# Patient Record
Sex: FEMALE | Race: WHITE | NOT HISPANIC OR LATINO
[De-identification: names, ages, dates, MRNs, and addresses within clinical notes are randomized per-mention and may not be internally consistent; named-entity substitution may affect disease eponyms.]

---

## 2017-01-02 ENCOUNTER — HISTORIC RESULTS (OUTPATIENT)
Dept: ADMINISTRATIVE | Age: 62
End: 2017-01-02

## 2018-01-30 ENCOUNTER — DOCUMENTATION ONLY (OUTPATIENT)
Dept: FAMILY MEDICINE | Facility: OTHER | Age: 63
End: 2018-01-30

## 2018-01-30 PROBLEM — R60.0 PEDAL EDEMA: Status: ACTIVE | Noted: 2017-05-18

## 2018-01-30 RX ORDER — ESTRADIOL 0.05 MG/D
PATCH TRANSDERMAL
COMMUNITY
Start: 2017-12-14

## 2018-01-30 RX ORDER — CLOTRIMAZOLE 1 %
CREAM (GRAM) TOPICAL
COMMUNITY
Start: 2014-09-30

## 2018-01-30 RX ORDER — BUPROPION HYDROCHLORIDE 300 MG/1
300 TABLET ORAL EVERY MORNING
COMMUNITY
Start: 2017-12-14

## 2018-01-30 RX ORDER — ATORVASTATIN CALCIUM 20 MG/1
20 TABLET, FILM COATED ORAL DAILY
COMMUNITY
Start: 2017-12-14

## 2018-01-30 RX ORDER — METOPROLOL SUCCINATE 25 MG/1
25 TABLET, EXTENDED RELEASE ORAL DAILY
COMMUNITY
Start: 2017-12-14

## 2018-01-30 RX ORDER — DIPHENOXYLATE HYDROCHLORIDE AND ATROPINE SULFATE 2.5; .025 MG/1; MG/1
1 TABLET ORAL DAILY
COMMUNITY

## 2018-01-30 RX ORDER — FUROSEMIDE 20 MG
20 TABLET ORAL EVERY MORNING
COMMUNITY
Start: 2017-12-14

## 2018-01-30 RX ORDER — DILTIAZEM HYDROCHLORIDE 120 MG/1
120 CAPSULE, COATED, EXTENDED RELEASE ORAL DAILY
COMMUNITY
Start: 2017-12-14

## 2018-04-19 ENCOUNTER — TELEPHONE (OUTPATIENT)
Dept: OTHER | Facility: CLINIC | Age: 63
End: 2018-04-19

## 2018-04-19 NOTE — TELEPHONE ENCOUNTER
4/19/2018    Call Regarding Onboarding are Choices    Attempt 1    Message on voicemail     Comments: 0 DEP      Outreach   CC

## 2018-05-01 ENCOUNTER — OFFICE VISIT - HEALTHEAST (OUTPATIENT)
Dept: FAMILY MEDICINE | Facility: CLINIC | Age: 63
End: 2018-05-01

## 2018-05-01 DIAGNOSIS — E89.41 SYMPTOMATIC STATES ASSOCIATED WITH ARTIFICIAL MENOPAUSE: ICD-10-CM

## 2018-05-01 DIAGNOSIS — E66.9 OBESITY: ICD-10-CM

## 2018-05-01 DIAGNOSIS — I10 HTN (HYPERTENSION): ICD-10-CM

## 2018-05-01 DIAGNOSIS — I48.91 ATRIAL FIBRILLATION WITH RVR (H): ICD-10-CM

## 2018-05-01 DIAGNOSIS — R73.01 ELEVATED FASTING BLOOD SUGAR: ICD-10-CM

## 2018-05-01 LAB — HBA1C MFR BLD: 5.7 % (ref 3.5–6)

## 2018-05-01 ASSESSMENT — MIFFLIN-ST. JEOR: SCORE: 1734.51

## 2018-05-03 NOTE — TELEPHONE ENCOUNTER
5/3/2018    Call Regarding Onboarding [employer] Choices    Attempt 2    Message Patient wants a call back within an hour, will arrange call back.     Comments: 0 DEP      Outreach   CC

## 2018-05-03 NOTE — TELEPHONE ENCOUNTER
5/3/2018      [facility] Choices- patient decided to declined call and will call back if she has any questions.             Outreach ,  Zulay Hayward

## 2018-05-11 ENCOUNTER — OFFICE VISIT - HEALTHEAST (OUTPATIENT)
Dept: CARDIOLOGY | Facility: CLINIC | Age: 63
End: 2018-05-11

## 2018-05-11 DIAGNOSIS — I48.0 PAF (PAROXYSMAL ATRIAL FIBRILLATION) (H): ICD-10-CM

## 2018-05-11 ASSESSMENT — MIFFLIN-ST. JEOR: SCORE: 1734.51

## 2018-05-25 ENCOUNTER — HOSPITAL ENCOUNTER (OUTPATIENT)
Dept: CARDIOLOGY | Facility: HOSPITAL | Age: 63
Discharge: HOME | End: 2018-05-25
Attending: INTERNAL MEDICINE

## 2018-05-25 ENCOUNTER — HOSPITAL ENCOUNTER (OUTPATIENT)
Dept: NUCLEAR MEDICINE | Facility: HOSPITAL | Age: 63
Discharge: HOME | End: 2018-05-25
Attending: INTERNAL MEDICINE

## 2018-05-25 ENCOUNTER — OFFICE VISIT (OUTPATIENT)
Dept: URGENT CARE | Facility: URGENT CARE | Age: 63
End: 2018-05-25
Payer: COMMERCIAL

## 2018-05-25 VITALS
WEIGHT: 254 LBS | HEART RATE: 69 BPM | HEIGHT: 67 IN | DIASTOLIC BLOOD PRESSURE: 88 MMHG | OXYGEN SATURATION: 97 % | TEMPERATURE: 99 F | BODY MASS INDEX: 39.87 KG/M2 | SYSTOLIC BLOOD PRESSURE: 142 MMHG

## 2018-05-25 DIAGNOSIS — R31.9 URINARY TRACT INFECTION WITH HEMATURIA, SITE UNSPECIFIED: Primary | ICD-10-CM

## 2018-05-25 DIAGNOSIS — N39.0 URINARY TRACT INFECTION WITH HEMATURIA, SITE UNSPECIFIED: Primary | ICD-10-CM

## 2018-05-25 DIAGNOSIS — R30.0 DYSURIA: ICD-10-CM

## 2018-05-25 DIAGNOSIS — R82.90 NONSPECIFIC FINDING ON EXAMINATION OF URINE: ICD-10-CM

## 2018-05-25 DIAGNOSIS — I48.0 PAF (PAROXYSMAL ATRIAL FIBRILLATION) (H): ICD-10-CM

## 2018-05-25 LAB
ALBUMIN UR-MCNC: 100 MG/DL
APPEARANCE UR: ABNORMAL
BILIRUB UR QL STRIP: NEGATIVE
COLOR UR AUTO: ABNORMAL
CV STRESS CURRENT BP HE: NORMAL
CV STRESS CURRENT HR HE: 64
CV STRESS CURRENT HR HE: 68
CV STRESS CURRENT HR HE: 70
CV STRESS CURRENT HR HE: 75
CV STRESS CURRENT HR HE: 75
CV STRESS CURRENT HR HE: 79
CV STRESS CURRENT HR HE: 79
CV STRESS CURRENT HR HE: 80
CV STRESS CURRENT HR HE: 84
CV STRESS CURRENT HR HE: 84
CV STRESS CURRENT HR HE: 86
CV STRESS CURRENT HR HE: 86
CV STRESS CURRENT HR HE: 87
CV STRESS CURRENT HR HE: 89
CV STRESS CURRENT HR HE: 91
CV STRESS CURRENT HR HE: 92
CV STRESS DEVIATION TIME HE: NORMAL
CV STRESS ECHO PERCENT HR HE: NORMAL
CV STRESS EXERCISE STAGE HE: NORMAL
CV STRESS FINAL RESTING BP HE: NORMAL
CV STRESS FINAL RESTING HR HE: 75
CV STRESS MAX HR HE: 93
CV STRESS MAX TREADMILL GRADE HE: 0
CV STRESS MAX TREADMILL SPEED HE: 0
CV STRESS PEAK DIA BP HE: NORMAL
CV STRESS PEAK SYS BP HE: NORMAL
CV STRESS PHASE HE: NORMAL
CV STRESS PROTOCOL HE: NORMAL
CV STRESS RESTING PT POSITION HE: NORMAL
CV STRESS ST DEVIATION AMOUNT HE: NORMAL
CV STRESS ST DEVIATION ELEVATION HE: NORMAL
CV STRESS ST EVELATION AMOUNT HE: NORMAL
CV STRESS TEST TYPE HE: NORMAL
CV STRESS TOTAL STAGE TIME MIN 1 HE: NORMAL
GLUCOSE UR STRIP-MCNC: NEGATIVE MG/DL
HGB UR QL STRIP: ABNORMAL
KETONES UR STRIP-MCNC: NEGATIVE MG/DL
LEUKOCYTE ESTERASE UR QL STRIP: ABNORMAL
NITRATE UR QL: NEGATIVE
NUC STRESS EJECTION FRACTION: 72 %
PH UR STRIP: 6 PH (ref 5–7)
RBC #/AREA URNS AUTO: ABNORMAL /HPF
SOURCE: ABNORMAL
SP GR UR STRIP: 1.01 (ref 1–1.03)
SPECIMEN SOURCE: NORMAL
STRESS ECHO BASELINE BP: NORMAL
STRESS ECHO BASELINE HR: 71
STRESS ECHO CALCULATED PERCENT HR: 60 %
STRESS ECHO LAST STRESS BP: NORMAL
STRESS ECHO LAST STRESS HR: 86
UROBILINOGEN UR STRIP-ACNC: 0.2 EU/DL (ref 0.2–1)
WBC #/AREA URNS AUTO: ABNORMAL /HPF
WET PREP SPEC: NORMAL

## 2018-05-25 PROCEDURE — 87088 URINE BACTERIA CULTURE: CPT | Performed by: FAMILY MEDICINE

## 2018-05-25 PROCEDURE — 87210 SMEAR WET MOUNT SALINE/INK: CPT | Performed by: FAMILY MEDICINE

## 2018-05-25 PROCEDURE — 81001 URINALYSIS AUTO W/SCOPE: CPT | Performed by: FAMILY MEDICINE

## 2018-05-25 PROCEDURE — 99213 OFFICE O/P EST LOW 20 MIN: CPT | Performed by: FAMILY MEDICINE

## 2018-05-25 PROCEDURE — 87186 SC STD MICRODIL/AGAR DIL: CPT | Performed by: FAMILY MEDICINE

## 2018-05-25 PROCEDURE — 87086 URINE CULTURE/COLONY COUNT: CPT | Performed by: FAMILY MEDICINE

## 2018-05-25 RX ORDER — NITROFURANTOIN 25; 75 MG/1; MG/1
100 CAPSULE ORAL 2 TIMES DAILY
Qty: 14 CAPSULE | Refills: 0 | Status: SHIPPED | OUTPATIENT
Start: 2018-05-25 | End: 2018-06-01

## 2018-05-25 ASSESSMENT — MIFFLIN-ST. JEOR: SCORE: 1726.57

## 2018-05-25 NOTE — MR AVS SNAPSHOT
"              After Visit Summary   2018    Dana Sykes    MRN: [id number]           Patient Information     Date Of Birth          1955        Visit Information        Provider Department      2018 7:50 PM MonySabiha,  [facility] [place]        Today's Diagnoses     Urinary tract infection with hematuria, site unspecified    -  1    Dysuria        Nonspecific finding on examination of urine           Follow-ups after your visit        Who to contact     If you have questions or need follow up information about today's clinic visit or your schedule please contact [facility] directly at [phone].  Normal or non-critical lab and imaging results will be communicated to you by [employer], letter or phone within 4 business days after the clinic has received the results. If you do not hear from us within 7 days, please contact the clinic through [employer] or phone. If you have a critical or abnormal lab result, we will notify you by phone as soon as possible.  Submit refill requests through [facility] or call your pharmacy and they will forward the refill request to us. Please allow 3 business days for your refill to be completed.          Additional Information About Your Visit        MyChart Information     [facility] lets you send messages to your doctor, view your test results, renew your prescriptions, schedule appointments and more. To sign up, go to [email or web address] . Click on \"Log in\" on the left side of the screen, which will take you to the Welcome page. Then click on \"Sign up Now\" on the right side of the page.     You will be asked to enter the access code listed below, as well as some personal information. Please follow the directions to create your username and password.     Your access code is: [email or web address]  Expires: 2018  8:45 PM     Your access code will  in 90 days. If you need help or a new code, please call your [place] " "clinic or [phone].        Care EveryWhere ID     This is your Care EveryWhere ID. This could be used by other organizations to access your [place] medical records  [id number]        Your Vitals Were     Pulse Temperature Height Pulse Oximetry BMI (Body Mass Index)       69 99  F (37.2  C) (Oral) 5' 7\" (1.702 m) 97% 39.78 kg/m2        Blood Pressure from Last 3 Encounters:   05/25/18 142/88   08/15/16 128/80   04/15/16 138/62    Weight from Last 3 Encounters:   05/25/18 254 lb (115.2 kg)   08/15/16 245 lb (111.1 kg)   11/04/15 239 lb 6.4 oz (108.6 kg)              We Performed the Following     *UA reflex to Microscopic and Culture ([place] and [facility] (except [place] and [place])     Urine Culture Aerobic Bacterial     Urine Microscopic     Wet prep          Today's Medication Changes          These changes are accurate as of 5/25/18  8:45 PM.  If you have any questions, ask your nurse or doctor.               Start taking these medicines.        Dose/Directions    nitroFURantoin (macrocrystal-monohydrate) 100 MG capsule   Commonly known as:  MACROBID   Used for:  Urinary tract infection with hematuria, site unspecified   Started by:  Sabiha Sykes DO        Dose:  100 mg   Take 1 capsule (100 mg) by mouth 2 times daily for 7 days   Quantity:  14 capsule   Refills:  0            Where to get your medicines      These medications were sent to [id number] - [place], [place] - [address] AT [facility] [address]  [facility] [address], [place] [address]    Hours:  24-hours Phone:  [phone]     nitroFURantoin (macrocrystal-monohydrate) 100 MG capsule                Primary Care Provider Fax #    Provider Not In System [phone]                Equal Access to Services     MARCELA COCHRAN AH: Serena olguin Somaria del carmen, waaxda luqadaha, qaybta kaalmada adeegyaguanaco, rachel ivey. So [facility] [place] [facility] [phone].    ATENCIÓN: Si rylee stubbs " a knox disposición servicios gratuitos de asistencia lingüística. Ramirez olguin [phone].    We comply with applicable federal civil rights laws and [place] laws. We do not discriminate on the basis of race, color, national origin, age, disability, sex, sexual orientation, or gender identity.            Thank you!     Thank you for choosing [facility]  for your care. Our goal is always to provide you with excellent care. Hearing back from our patients is one way we can continue to improve our services. Please take a few minutes to complete the written survey that you may receive in the mail after your visit with us. Thank you!             Your Updated Medication List - Protect others around you: Learn how to safely use, store and throw away your medicines at www.disposemymeds.org.          This list is accurate as of 5/25/18  8:45 PM.  Always use your most recent med list.                   Brand Name Dispense Instructions for use Diagnosis    atorvastatin 20 MG tablet    LIPITOR     Take 20 mg by mouth daily        buPROPion 300 MG 24 hr tablet    WELLBUTRIN XL     Take 300 mg by mouth every morning        clotrimazole 1 % cream    LOTRIMIN          diltiazem 120 MG 24 hr capsule    CARDIZEM CD/CARTIA XT     Take 120 mg by mouth daily        estradiol 0.05 MG/24HR WK patch    CLIMARA     apply 1 patch on dry, clean , hairless skin once weekly        furosemide 20 MG tablet    LASIX     Take 20 mg by mouth every morning        metoprolol succinate 25 MG 24 hr tablet    TOPROL-XL     Take 25 mg by mouth daily        MULTI-VITAMINS Tabs      Take 1 tablet by mouth daily        nitroFURantoin (macrocrystal-monohydrate) 100 MG capsule    MACROBID    14 capsule    Take 1 capsule (100 mg) by mouth 2 times daily for 7 days    Urinary tract infection with hematuria, site unspecified       rivaroxaban ANTICOAGULANT 10 MG Tabs tablet    XARELTO     Take 20 mg by mouth daily        vitamin D3 1000 units  Caps      Take 1,000 Units by mouth daily

## 2018-05-26 NOTE — PROGRESS NOTES
"SUBJECTIVE:  Dana Sykes is a 62 year old female who  presents today for a possible UTI.   Symptoms:dysuria, frequency and burning  Onset of symptoms: today  Severity: moderate.    Discharge present?: no, but vaginal area feeling a little raw.    Was just on keflex recently for a UTI diagnosed about 2-3 weeks ago, symptoms were resolved until today.  Hematuria?: yes noticed some blood on the tissue.  LMP: No LMP recorded. Patient is postmenopausal.  This patient does have a history of urinary tract infections.   Patient denies: fevers, flank pain or vomiting     ROS:   5-Point Review of Systems Negative-- Except as stated above.    OBJECTIVE:  /88  Pulse 69  Temp 99  F (37.2  C) (Oral)  Ht 5' 7\" (1.702 m)  Wt 254 lb (115.2 kg)  SpO2 97%  BMI 39.78 kg/m2  GENERAL APPEARANCE: healthy, alert and no distress  :  External genitalia appears normal.  There is some erythema/irritated skin inner labia both sides, no lesions and skin intact.    Results for orders placed or performed in visit on 05/25/18   *UA reflex to Microscopic and Culture ([place] [facility] (except [place] and [place])   Result Value Ref Range    Color Urine Brown     Appearance Urine Cloudy     Glucose Urine Negative NEG^Negative mg/dL    Bilirubin Urine Negative NEG^Negative    Ketones Urine Negative NEG^Negative mg/dL    Specific Gravity Urine 1.010 1.003 - 1.035    Blood Urine Large (A) NEG^Negative    pH Urine 6.0 5.0 - 7.0 pH    Protein Albumin Urine 100 (A) NEG^Negative mg/dL    Urobilinogen Urine 0.2 0.2 - 1.0 EU/dL    Nitrite Urine Negative NEG^Negative    Leukocyte Esterase Urine Moderate (A) NEG^Negative    Source Midstream Urine    Urine Microscopic   Result Value Ref Range    WBC Urine  (A) OTO5^0 - 5 /HPF    RBC Urine  (A) OTO2^O - 2 /HPF   Wet prep   Result Value Ref Range    Specimen Description Vagina     Wet Prep No clue cells seen     Wet Prep No yeast seen     Wet Prep No Trichomonas seen  "         ASSESSMENT/PLAN:     ICD-10-CM    1. Urinary tract infection with hematuria, site unspecified N39.0 nitroFURantoin, macrocrystal-monohydrate, (MACROBID) 100 MG capsule    R31.9    2. Dysuria R30.0 *UA reflex to Microscopic and Culture ([place] [facility] (except [place] and [place])     Urine Microscopic     Wet prep   3. Nonspecific finding on examination of urine R82.90 Urine Culture Aerobic Bacterial         Start the macrobid tonight.  UC pending.  We discussed the expected course of the illness and symptomatic cares in detail.   Advised to return to care if symptoms not improving as expected, do not resolve completely, or if any new or worsening symptoms develop.

## 2018-05-27 LAB
BACTERIA SPEC CULT: ABNORMAL
SPECIMEN SOURCE: ABNORMAL

## 2018-05-29 ENCOUNTER — COMMUNICATION - HEALTHEAST (OUTPATIENT)
Dept: CARDIOLOGY | Facility: CLINIC | Age: 63
End: 2018-05-29

## 2018-06-22 ENCOUNTER — OFFICE VISIT (OUTPATIENT)
Dept: URGENT CARE | Facility: URGENT CARE | Age: 63
End: 2018-06-22
Payer: COMMERCIAL

## 2018-06-22 VITALS
WEIGHT: 258.4 LBS | OXYGEN SATURATION: 96 % | BODY MASS INDEX: 40.47 KG/M2 | DIASTOLIC BLOOD PRESSURE: 64 MMHG | RESPIRATION RATE: 20 BRPM | SYSTOLIC BLOOD PRESSURE: 140 MMHG | TEMPERATURE: 98.6 F | HEART RATE: 60 BPM

## 2018-06-22 DIAGNOSIS — R30.0 DYSURIA: Primary | ICD-10-CM

## 2018-06-22 DIAGNOSIS — R82.90 NONSPECIFIC FINDING ON EXAMINATION OF URINE: ICD-10-CM

## 2018-06-22 LAB
ALBUMIN UR-MCNC: ABNORMAL MG/DL
APPEARANCE UR: ABNORMAL
BILIRUB UR QL STRIP: NEGATIVE
COLOR UR AUTO: YELLOW
GLUCOSE UR STRIP-MCNC: NEGATIVE MG/DL
HGB UR QL STRIP: ABNORMAL
KETONES UR STRIP-MCNC: NEGATIVE MG/DL
LEUKOCYTE ESTERASE UR QL STRIP: ABNORMAL
NITRATE UR QL: NEGATIVE
PH UR STRIP: 6 PH (ref 5–7)
RBC #/AREA URNS AUTO: ABNORMAL /HPF
SOURCE: ABNORMAL
SP GR UR STRIP: <=1.005 (ref 1–1.03)
UROBILINOGEN UR STRIP-ACNC: 0.2 EU/DL (ref 0.2–1)
WBC #/AREA URNS AUTO: ABNORMAL /HPF

## 2018-06-22 PROCEDURE — 87086 URINE CULTURE/COLONY COUNT: CPT | Performed by: FAMILY MEDICINE

## 2018-06-22 PROCEDURE — 81001 URINALYSIS AUTO W/SCOPE: CPT | Performed by: FAMILY MEDICINE

## 2018-06-22 PROCEDURE — 99213 OFFICE O/P EST LOW 20 MIN: CPT | Performed by: NURSE PRACTITIONER

## 2018-06-22 RX ORDER — SULFAMETHOXAZOLE/TRIMETHOPRIM 800-160 MG
1 TABLET ORAL 2 TIMES DAILY
Qty: 14 TABLET | Refills: 0 | Status: SHIPPED | OUTPATIENT
Start: 2018-06-22

## 2018-06-22 NOTE — MR AVS SNAPSHOT
After Visit Summary   6/22/2018    Dana Sykes    MRN: [id number]           Patient Information     Date Of Birth          1955        Visit Information        Provider Department      6/22/2018 8:45 PM Vianey Steven NP [facility]        Today's Diagnoses     Dysuria    -  1    Nonspecific finding on examination of urine          Care Instructions      Anatomy of the Female Urinary Tract  Your urinary tract helps get rid of urine (your body s liquid waste). The kidneys collect chemicals and water your body doesn t need. This is turned into urine. Urine travels out of the kidneys through the ureters to the bladder. The bladder holds urine until you re ready to release it. The urethra carries urine from the bladder out of the body. The main sphincter muscle circles the mid-urethra.      Front view of female urinary tract.   Date Last Reviewed: 1/1/2017 2000-2017 [employer]. [address] [place] [address]. All rights reserved. This information is not intended as a substitute for professional medical care. Always follow your healthcare professional's instructions.        Dysuria     Painful urination (dysuria) is often caused by a problem in the urinary tract.   Dysuria is pain felt during urination. It is often described as a burning. Learn more about this problem and how it can be treated.  What causes dysuria?  Possible causes include:    Infection with a bacteria or virus such as a urinary tract infection (UTI or a sexually transmitted infection (STI)    Sensitivity or allergy to chemicals such as those found in lotions and other products    Prostate or bladder problems    Radiation therapy to the pelvic area  How is dysuria diagnosed?  Your healthcare provider will examine you. He or she will ask about your symptoms and health. After talking with you and doing a physical exam, your healthcare provider may know what is causing your dysuria.  "He or she will usually request  a sample of your urine. Tests of your urine, or a \"urinalysis,\" are done. A urinalysis may include:    Looking at the urine sample (visual exam)    Checking for substances (chemical exam)    Looking at a small amount under a microscope (microscopic exam)  Some parts of the urinalysis may be done in the provider's office and some in a lab. And, the urine sample may be checked for bacteria and yeast (urine culture). Your healthcare provider will tell you more about these tests if they are needed.  How is dysuria treated?  Treatment depends on the cause. If you have a bacterial infection, you may need antibiotics. You may be given medicines to make it easier for you to urinate and help relieve pain. Your healthcare provider can tell you more about your treatment options. Untreated, symptoms may get worse.  When to call your healthcare provider  Call the healthcare provider right away if you have any of the following:    Fever of 100.4 F (38 C) or higher     No improvement after three days of treatment    Trouble urinating because of pain    New or increased discharge from the vagina or penis    Rash or joint pain    Increased back or abdominal pain    Enlarged painful lymph nodes (lumps) in the groin   Date Last Reviewed: 1/1/2017 2000-2017 [employer]. [address] [place] [address], [place] [address]. All rights reserved. This information is not intended as a substitute for professional medical care. Always follow your healthcare professional's instructions.  Bactrim DS one tablet twice a day for 7 days                 Follow-ups after your visit        Who to contact     If you have questions or need follow up information about today's clinic visit or your schedule please contact [facility] directly at [phone].  Normal or non-critical lab and imaging results will be communicated to you by MyChart, letter or phone within 4 business days after the " "clinic has received the results. If you do not hear from us within 7 days, please contact the clinic through [employer] or phone. If you have a critical or abnormal lab result, we will notify you by phone as soon as possible.  Submit refill requests through [employer] or call your pharmacy and they will forward the refill request to us. Please allow 3 business days for your refill to be completed.          Additional Information About Your Visit        [email or web address] Information     [employer] lets you send messages to your doctor, view your test results, renew your prescriptions, schedule appointments and more. To sign up, go to [email or web address] . Click on \"Log in\" on the left side of the screen, which will take you to the Welcome page. Then click on \"Sign up Now\" on the right side of the page.     You will be asked to enter the access code listed below, as well as some personal information. Please follow the directions to create your username and password.     Your access code is: [email or web address]  Expires: 2018  8:45 PM     Your access code will  in 90 days. If you need help or a new code, please call your [place] clinic or [phone].        Care EveryWhere ID     This is your Care EveryWhere ID. This could be used by other organizations to access your [place] medical records  [id number]        Your Vitals Were     Pulse Temperature Respirations Pulse Oximetry BMI (Body Mass Index)       60 98.6  F (37  C) (Tympanic) 20 96% 40.47 kg/m2        Blood Pressure from Last 3 Encounters:   18 140/64   18 142/88   08/15/16 128/80    Weight from Last 3 Encounters:   18 258 lb 6.4 oz (117.2 kg)   18 254 lb (115.2 kg)   08/15/16 245 lb (111.1 kg)              We Performed the Following     UA with Microscopic reflex to Culture     Urine Culture Aerobic Bacterial          Today's Medication Changes          These changes are accurate as of 18  9:36 PM.  If you have any questions, ask your " nurse or doctor.               Start taking these medicines.        Dose/Directions    sulfamethoxazole-trimethoprim 800-160 MG per tablet   Commonly known as:  BACTRIM DS/SEPTRA DS   Used for:  Dysuria        Dose:  1 tablet   Take 1 tablet by mouth 2 times daily   Quantity:  14 tablet   Refills:  0            Where to get your medicines      [id number] - [place], [place] - [address] AT [facility] [address]  [address], [place] [address]    Hours:  24-hours Phone:  [phone]     sulfamethoxazole-trimethoprim 800-160 MG per tablet                Primary Care Provider Fax #    Provider Not In System [phone]                Equal Access to Services     MARCELA COCHRAN AH: Serena Marie, wagadiel marie, qaybta kaalmada jabari, rachel ivey. So [facility] [phone].    ATENCIÓN: Si habla español, tiene a knox disposición servicios gratuitos de asistencia lingüística. Llame al [phone].    We comply with applicable federal civil rights laws and [place] laws. We do not discriminate on the basis of race, color, national origin, age, disability, sex, sexual orientation, or gender identity.            Thank you!     Thank you for choosing [facility]  for your care. Our goal is always to provide you with excellent care. Hearing back from our patients is one way we can continue to improve our services. Please take a few minutes to complete the written survey that you may receive in the mail after your visit with us. Thank you!             Your Updated Medication List - Protect others around you: Learn how to safely use, store and throw away your medicines at www.disposemymeds.org.          This list is accurate as of 6/22/18  9:36 PM.  Always use your most recent med list.                   Brand Name Dispense Instructions for use Diagnosis    atorvastatin 20 MG tablet    LIPITOR      Take 20 mg by mouth daily        buPROPion 300 MG 24 hr tablet    WELLBUTRIN XL     Take 300 mg by mouth every morning        clotrimazole 1 % cream    LOTRIMIN          diltiazem 120 MG 24 hr capsule    CARDIZEM CD/CARTIA XT     Take 120 mg by mouth daily        estradiol 0.05 MG/24HR WK patch    CLIMARA     apply 1 patch on dry, clean , hairless skin once weekly        furosemide 20 MG tablet    LASIX     Take 20 mg by mouth every morning        metoprolol succinate 25 MG 24 hr tablet    TOPROL-XL     Take 25 mg by mouth daily        MULTI-VITAMINS Tabs      Take 1 tablet by mouth daily        rivaroxaban ANTICOAGULANT 10 MG Tabs tablet    XARELTO     Take 20 mg by mouth daily        sulfamethoxazole-trimethoprim 800-160 MG per tablet    BACTRIM DS/SEPTRA DS    14 tablet    Take 1 tablet by mouth 2 times daily    Dysuria       vitamin D3 1000 units Caps      Take 1,000 Units by mouth daily

## 2018-06-23 LAB
BACTERIA SPEC CULT: NORMAL
SPECIMEN SOURCE: NORMAL

## 2018-06-23 NOTE — PATIENT INSTRUCTIONS
"  Anatomy of the Female Urinary Tract  Your urinary tract helps get rid of urine (your body s liquid waste). The kidneys collect chemicals and water your body doesn t need. This is turned into urine. Urine travels out of the kidneys through the ureters to the bladder. The bladder holds urine until you re ready to release it. The urethra carries urine from the bladder out of the body. The main sphincter muscle circles the mid-urethra.      Front view of female urinary tract.   Date Last Reviewed: 1/1/2017 2000-2017 [employer]. [address] [place] [address]. All rights reserved. This information is not intended as a substitute for professional medical care. Always follow your healthcare professional's instructions.        Dysuria     Painful urination (dysuria) is often caused by a problem in the urinary tract.   Dysuria is pain felt during urination. It is often described as a burning. Learn more about this problem and how it can be treated.  What causes dysuria?  Possible causes include:    Infection with a bacteria or virus such as a urinary tract infection (UTI or a sexually transmitted infection (STI)    Sensitivity or allergy to chemicals such as those found in lotions and other products    Prostate or bladder problems    Radiation therapy to the pelvic area  How is dysuria diagnosed?  Your healthcare provider will examine you. He or she will ask about your symptoms and health. After talking with you and doing a physical exam, your healthcare provider may know what is causing your dysuria. He or she will usually request  a sample of your urine. Tests of your urine, or a \"urinalysis,\" are done. A urinalysis may include:    Looking at the urine sample (visual exam)    Checking for substances (chemical exam)    Looking at a small amount under a microscope (microscopic exam)  Some parts of the urinalysis may be done in the provider's office and some in a lab. And, the urine sample may " be checked for bacteria and yeast (urine culture). Your healthcare provider will tell you more about these tests if they are needed.  How is dysuria treated?  Treatment depends on the cause. If you have a bacterial infection, you may need antibiotics. You may be given medicines to make it easier for you to urinate and help relieve pain. Your healthcare provider can tell you more about your treatment options. Untreated, symptoms may get worse.  When to call your healthcare provider  Call the healthcare provider right away if you have any of the following:    Fever of 100.4 F (38 C) or higher     No improvement after three days of treatment    Trouble urinating because of pain    New or increased discharge from the vagina or penis    Rash or joint pain    Increased back or abdominal pain    Enlarged painful lymph nodes (lumps) in the groin   Date Last Reviewed: 1/1/2017 2000-2017 [employer]. [address] [place] [address]. All rights reserved. This information is not intended as a substitute for professional medical care. Always follow your healthcare professional's instructions.  Bactrim DS one tablet twice a day for 7 days amount 14  Follow up with primary this week which I wrote on her discharge instructions and did not print again

## 2018-06-23 NOTE — PROGRESS NOTES
SUBJECTIVE:  Dana Sykes is a 62 year old female who  presents today for a possible UTI for the last 2 to 3 days. Symptoms of  have been going on urinary urgency, hematuria, lower back pain all over, no change in vaginal discharge nor odor.  Patient has increased her fluid intake which helps some.  Patient states this was a sudden onset which is getting worse. There is no history of fever, chills, nausea or vomiting.  Patient denies long duration, rigors, flank pain, temperature > 101 degrees F. and Vomiting, significant nausea or diarrhea but is on Xarelto.    Patient was seen here last time for UTI on 5/22/2018 and noted she was treated with Macrobid which was sensitive. Patient states she did improve after that treatment.  This patient does have a history of urinary tract infections.     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  Patient states she wasn't prepared for this, she was suddenly paged.  Patient states she supported the mother and they both had tears.  Patient states she this was the first time she had this happen. Patient states this is a privilege to be there for others in need.      Past Medical History:   Diagnosis Date     Closed fracture of lower end of left radius with routine healing     11/4/2015     Other fracture of unspecified lower leg, initial encounter for closed fracture     Hx closed ankle Fx     Presence of other bone and tendon implants     11/11/2015     Urinary tract infection     Hx of chronic UTI, eval. Dr. Monroe     Current Outpatient Prescriptions   Medication Sig Dispense Refill     atorvastatin (LIPITOR) 20 MG tablet Take 20 mg by mouth daily       buPROPion (WELLBUTRIN XL) 300 MG 24 hr tablet Take 300 mg by mouth every morning       Cholecalciferol (VITAMIN D3) 1000 UNITS CAPS Take 1,000 Units by mouth daily       clotrimazole (LOTRIMIN) 1 % cream        diltiazem (CARDIZEM CD/CARTIA XT) 120 MG 24 hr capsule  Take 120 mg by mouth daily       estradiol (CLIMARA) 0.05 MG/24HR WK patch apply 1 patch on dry, clean , hairless skin once weekly       furosemide (LASIX) 20 MG tablet Take 20 mg by mouth every morning       metoprolol succinate (TOPROL-XL) 25 MG 24 hr tablet Take 25 mg by mouth daily       Multiple Vitamin (MULTI-VITAMINS) TABS Take 1 tablet by mouth daily       rivaroxaban ANTICOAGULANT (XARELTO) 10 MG TABS tablet Take 20 mg by mouth daily        Social History   Substance Use Topics     Smoking status: Former Smoker     Types: Cigarettes     Quit date: 1/30/1980     Smokeless tobacco: Never Used     Alcohol use No     ROS:   CONSTITUTIONAL:NEGATIVE for fever, chills,  INTEGUMENTARY/SKIN: NEGATIVE for worrisome rashes  RESP:NEGATIVE for significant cough or SOB  CV: NEGATIVE for chest pain  GI: NEGATIVE for nausea, vomiting or diarrhea    OBJECTIVE:  /64 (BP Location: Left arm, Patient Position: Sitting, Cuff Size: Adult Large)  Pulse 60  Temp 98.6  F (37  C) (Tympanic)  Resp 20  Wt 258 lb 6.4 oz (117.2 kg)  SpO2 96%  BMI 40.47 kg/m2  GENERAL APPEARANCE: healthy, alert and no distress  RESP: lungs clear to auscultation - no rales, rhonchi or wheezes  CV: regular rates and rhythm, normal S1 S2, no murmur noted  ABDOMEN:  soft, nontender, no HSM or masses and bowel sounds normal  BACK: No CVA tenderness  SKIN: no  rashes    ASSESSMENT:  Lower, uncomplicated urinary tract infection.    PLAN:  UA RESULTS:  Recent Labs   Lab Test  06/22/18   2100   COLOR  Yellow   APPEARANCE  Slightly Cloudy   URINEGLC  Negative   URINEBILI  Negative   URINEKETONE  Negative   SG  <=1.005   UBLD  Large*   URINEPH  6.0   PROTEIN  Trace*   UROBILINOGEN  0.2   NITRITE  Negative   LEUKEST  Moderate*   RBCU  O - 2   WBCU  25-50*   Bactrim DS one tablet bid amount 14 for 7 days  NP checked Bactrim for drug to drug interactions with each medication which there was none.  As per ordered above  Drink plenty of fluids.    Prevention  and treatment of UTI's discussed.  Signs and symptoms of pyelonephritis mentioned.    Follow up with primary care physician next week to make sure she is getting better and patient agreed

## 2018-07-02 ENCOUNTER — OFFICE VISIT - HEALTHEAST (OUTPATIENT)
Dept: FAMILY MEDICINE | Facility: CLINIC | Age: 63
End: 2018-07-02

## 2018-07-02 DIAGNOSIS — N39.0 RECURRENT UTI: ICD-10-CM

## 2018-07-02 LAB
ALBUMIN UR-MCNC: NEGATIVE MG/DL
APPEARANCE UR: CLEAR
BILIRUB UR QL STRIP: NEGATIVE
COLOR UR AUTO: YELLOW
GLUCOSE UR STRIP-MCNC: NEGATIVE MG/DL
HGB UR QL STRIP: NEGATIVE
KETONES UR STRIP-MCNC: NEGATIVE MG/DL
LEUKOCYTE ESTERASE UR QL STRIP: NEGATIVE
NITRATE UR QL: NEGATIVE
PH UR STRIP: 5.5 [PH] (ref 5–8)
SP GR UR STRIP: 1.01 (ref 1–1.03)
UROBILINOGEN UR STRIP-ACNC: NORMAL

## 2018-07-02 ASSESSMENT — MIFFLIN-ST. JEOR: SCORE: 1715.23

## 2018-08-17 ENCOUNTER — COMMUNICATION - HEALTHEAST (OUTPATIENT)
Dept: ADMINISTRATIVE | Facility: CLINIC | Age: 63
End: 2018-08-17

## 2018-08-31 ENCOUNTER — OFFICE VISIT - HEALTHEAST (OUTPATIENT)
Dept: FAMILY MEDICINE | Facility: CLINIC | Age: 63
End: 2018-08-31

## 2018-08-31 DIAGNOSIS — N95.2 VAGINAL ATROPHY: ICD-10-CM

## 2018-08-31 ASSESSMENT — MIFFLIN-ST. JEOR: SCORE: 1726.57

## 2018-09-13 ENCOUNTER — COMMUNICATION - HEALTHEAST (OUTPATIENT)
Dept: ADMINISTRATIVE | Facility: CLINIC | Age: 63
End: 2018-09-13

## 2018-12-27 ENCOUNTER — COMMUNICATION - HEALTHEAST (OUTPATIENT)
Dept: FAMILY MEDICINE | Facility: CLINIC | Age: 63
End: 2018-12-27

## 2020-03-10 ENCOUNTER — HEALTH MAINTENANCE LETTER (OUTPATIENT)
Age: 65
End: 2020-03-10

## 2020-12-27 ENCOUNTER — HEALTH MAINTENANCE LETTER (OUTPATIENT)
Age: 65
End: 2020-12-27

## 2021-03-11 ENCOUNTER — IMMUNIZATION (OUTPATIENT)
Dept: VACCINE CLINIC | Facility: HOSPITAL | Age: 66
End: 2021-03-11

## 2021-03-11 PROCEDURE — 91300 HC SARSCOV02 VAC 30MCG/0.3ML IM: CPT | Performed by: INTERNAL MEDICINE

## 2021-03-11 PROCEDURE — 0001A: CPT | Performed by: INTERNAL MEDICINE

## 2021-04-05 ENCOUNTER — IMMUNIZATION (OUTPATIENT)
Dept: VACCINE CLINIC | Facility: HOSPITAL | Age: 66
End: 2021-04-05

## 2021-04-05 PROCEDURE — 0002A: CPT | Performed by: INTERNAL MEDICINE

## 2021-04-05 PROCEDURE — 91300 HC SARSCOV02 VAC 30MCG/0.3ML IM: CPT | Performed by: INTERNAL MEDICINE

## 2021-04-24 ENCOUNTER — HEALTH MAINTENANCE LETTER (OUTPATIENT)
Age: 66
End: 2021-04-24

## 2021-04-28 ENCOUNTER — OFFICE VISIT (OUTPATIENT)
Dept: INTERNAL MEDICINE | Facility: CLINIC | Age: 66
End: 2021-04-28

## 2021-04-28 VITALS
SYSTOLIC BLOOD PRESSURE: 141 MMHG | HEIGHT: 67 IN | HEART RATE: 62 BPM | WEIGHT: 265 LBS | OXYGEN SATURATION: 98 % | BODY MASS INDEX: 41.59 KG/M2 | RESPIRATION RATE: 16 BRPM | TEMPERATURE: 96.6 F | DIASTOLIC BLOOD PRESSURE: 77 MMHG

## 2021-04-28 DIAGNOSIS — E78.2 MIXED HYPERLIPIDEMIA: ICD-10-CM

## 2021-04-28 DIAGNOSIS — F41.9 ANXIETY: ICD-10-CM

## 2021-04-28 DIAGNOSIS — R00.2 PALPITATIONS: ICD-10-CM

## 2021-04-28 DIAGNOSIS — Z01.419 ROUTINE GYNECOLOGICAL EXAMINATION: ICD-10-CM

## 2021-04-28 DIAGNOSIS — I10 BENIGN ESSENTIAL HYPERTENSION: Primary | ICD-10-CM

## 2021-04-28 DIAGNOSIS — Z12.11 COLON CANCER SCREENING: ICD-10-CM

## 2021-04-28 DIAGNOSIS — Z12.31 ENCOUNTER FOR SCREENING MAMMOGRAM FOR MALIGNANT NEOPLASM OF BREAST: ICD-10-CM

## 2021-04-28 DIAGNOSIS — I48.20 CHRONIC ATRIAL FIBRILLATION (HCC): ICD-10-CM

## 2021-04-28 DIAGNOSIS — E11.9 CONTROLLED TYPE 2 DIABETES MELLITUS WITHOUT COMPLICATION, WITHOUT LONG-TERM CURRENT USE OF INSULIN (HCC): ICD-10-CM

## 2021-04-28 DIAGNOSIS — F33.0 MILD EPISODE OF RECURRENT MAJOR DEPRESSIVE DISORDER (HCC): ICD-10-CM

## 2021-04-28 PROCEDURE — 99204 OFFICE O/P NEW MOD 45 MIN: CPT | Performed by: INTERNAL MEDICINE

## 2021-04-28 RX ORDER — DILTIAZEM HYDROCHLORIDE 120 MG/1
120 CAPSULE, COATED, EXTENDED RELEASE ORAL DAILY
COMMUNITY
End: 2021-04-28 | Stop reason: SDUPTHER

## 2021-04-28 RX ORDER — ATORVASTATIN CALCIUM 20 MG/1
20 TABLET, FILM COATED ORAL DAILY
COMMUNITY
End: 2021-06-18 | Stop reason: SDUPTHER

## 2021-04-28 RX ORDER — CLOTRIMAZOLE 1 %
CREAM (GRAM) TOPICAL DAILY
COMMUNITY

## 2021-04-28 RX ORDER — LORATADINE 10 MG/1
10 CAPSULE, LIQUID FILLED ORAL DAILY
COMMUNITY

## 2021-04-28 RX ORDER — ESTRADIOL 0.5 MG/1
0.5 TABLET ORAL DAILY
COMMUNITY
End: 2021-05-27 | Stop reason: SDUPTHER

## 2021-04-28 RX ORDER — BUPROPION HYDROCHLORIDE 300 MG/1
300 TABLET ORAL DAILY
COMMUNITY
End: 2021-07-12 | Stop reason: SDUPTHER

## 2021-04-28 RX ORDER — DILTIAZEM HYDROCHLORIDE 180 MG/1
180 CAPSULE, COATED, EXTENDED RELEASE ORAL
Qty: 30 CAPSULE | Refills: 5 | Status: SHIPPED | OUTPATIENT
Start: 2021-04-28 | End: 2021-04-30 | Stop reason: SDUPTHER

## 2021-04-28 NOTE — PROGRESS NOTES
Subjective   Neetu Wick is a 65 y.o. female.     Chief Complaint   Patient presents with   • Establish Care   • Hypertension   • Hyperlipidemia   • Atrial Fibrillation   • Diabetes   • Anxiety   • Depression       History of Present Illness   HPI: Patient is here for initial visit and to follow up on the blood pressure  The patient is taking the blood pressure medications as prescribed and has had no side effects. The patient is also here to follow up on the cholesterol and is trying to follow a diet. The patient is  also here to follow up on sugar and is  due to get lab work done .  The patient also complains of anxiety and depression and needs refills on medications .  She also complains of palpitation, she does have atrial fibrillation and she recently moved from [place] and needs to follow-up with cardiology, she is on anticoagulation with Xarelto, she also states she is due for mammogram, GYN exam and colonoscopy , she has brought in her  previous medical records which have been reviewed with her today  Hyperlipidemia   Pertinent negatives include no chest pain or shortness of breath.   Hypertension   Pertinent negatives include no chest pain, palpitations or shortness of breath.    Review of Systems   Constitutional: Negative for appetite change, fatigue and fever.   HENT: Negative for congestion, ear discharge, ear pain, sinus pressure and sore throat.    Eyes: Negative for pain and discharge.   Respiratory: Negative for cough, chest tightness, shortness of breath and wheezing.    Cardiovascular: Positive for palpitations. Negative for chest pain and leg swelling.   Gastrointestinal: Negative for abdominal pain, blood in stool, constipation, diarrhea and nausea.   Endocrine: Negative for cold intolerance and heat intolerance.   Genitourinary: Negative for dysuria, flank pain and frequency.   Musculoskeletal: Negative for back pain and joint swelling.   Skin: Negative for color change.    Allergic/Immunologic: Negative for environmental allergies and food allergies.   Neurological: Negative for dizziness, weakness, numbness and headaches.   Hematological: Negative for adenopathy. Does not bruise/bleed easily.   Psychiatric/Behavioral: Negative for behavioral problems and dysphoric mood. The patient is not nervous/anxious.        Past Medical History:   Diagnosis Date   • Afib (CMS/HCC)    • Depression    • Dyslipidemia    • Fracture    • Fracture, radius 11/04/2015    left   • Hypertension    • Incontinence    • Obesity    • Ovarian failure    • Panic attacks    • Pedal edema    • Urinary tract infection    • Varicose vein of leg    • Venous insufficiency    • Vitamin D deficiency        Past Surgical History:   Procedure Laterality Date   • ABDOMINAL HYSTERECTOMY  1998   • BILATERAL SALPINGO OOPHORECTOMY  1998   • BLADDER SUSPENSION  2009   • COLONOSCOPY  08/03/2015   • ENDOVENOUS ABLATION SAPHENOUS VEIN W/ LASER Bilateral 2015   • WISDOM TOOTH EXTRACTION         Family History   Problem Relation Age of Onset   • Hypertension Mother    • Obesity Mother    • Hypertension Father    • Dementia Father    • Atrial fibrillation Father    • Obesity Sister    • Cancer Maternal Aunt         breast   • Cancer Maternal Grandmother         breast        reports that she has quit smoking. She has never used smokeless tobacco. She reports previous alcohol use. She reports that she does not use drugs.    Allergies   Allergen Reactions   • Codeine Itching           Current Outpatient Medications:   •  atorvastatin (LIPITOR) 20 MG tablet, Take 20 mg by mouth Daily., Disp: , Rfl:   •  buPROPion XL (WELLBUTRIN XL) 300 MG 24 hr tablet, Take 300 mg by mouth Daily., Disp: , Rfl:   •  clotrimazole (LOTRIMIN) 1 % cream, Apply  topically to the appropriate area as directed Daily., Disp: , Rfl:   •  dilTIAZem CD (dilTIAZem CD) 180 MG 24 hr capsule, Take 1 capsule by mouth every night at bedtime., Disp: 30 capsule, Rfl:  "5  •  estradiol (ESTRACE) 0.5 MG tablet, Take 0.5 mg by mouth Daily., Disp: , Rfl:   •  Loratadine 10 MG capsule, Take 10 mg by mouth Daily., Disp: , Rfl:   •  Metoprolol Succinate 25 MG capsule extended-release 24 hour sprinkle, Take 25 mg by mouth Daily., Disp: , Rfl:   •  rivaroxaban (XARELTO) 20 MG tablet, Take 20 mg by mouth Every Night., Disp: , Rfl:       Objective   Blood pressure 141/77, pulse 62, temperature 96.6 °F (35.9 °C), resp. rate 16, height 170.2 cm (67\"), weight 120 kg (265 lb), SpO2 98 %.    Physical Exam  Vitals and nursing note reviewed.   Constitutional:       General: She is not in acute distress.     Appearance: Normal appearance. She is not diaphoretic.   HENT:      Head: Normocephalic and atraumatic.      Right Ear: External ear normal.      Left Ear: External ear normal.      Nose: Nose normal.   Eyes:      Extraocular Movements: Extraocular movements intact.      Conjunctiva/sclera: Conjunctivae normal.   Neck:      Trachea: Trachea normal.   Cardiovascular:      Rate and Rhythm: Normal rate and regular rhythm.      Heart sounds: Normal heart sounds.   Pulmonary:      Effort: Pulmonary effort is normal. No respiratory distress.      Breath sounds: Normal breath sounds.   Abdominal:      General: Abdomen is flat.   Musculoskeletal:      Cervical back: Neck supple.      Comments: Moves all limbs   Skin:     General: Skin is warm.   Neurological:      Mental Status: She is alert and oriented to person, place, and time.      Comments: No gross motor or sensory deficits         Patient's Body mass index is 41.5 kg/m². BMI is above normal parameters. Recommendations include: educational material, exercise counseling and nutrition counseling.      No results found for this or any previous visit.      Assessment/Plan   Diagnoses and all orders for this visit:    1. Benign essential hypertension (Primary)  -     Ambulatory Referral to Cardiology    2. Mixed hyperlipidemia  -     CBC & " Differential  -     Comprehensive Metabolic Panel  -     Lipid Panel    3. Chronic atrial fibrillation (CMS/HCC)  -     Ambulatory Referral to Cardiology  -     TSH  -     dilTIAZem CD (dilTIAZem CD) 180 MG 24 hr capsule; Take 1 capsule by mouth every night at bedtime.  Dispense: 30 capsule; Refill: 5    4. Controlled type 2 diabetes mellitus without complication, without long-term current use of insulin (CMS/HCC)  -     Hemoglobin A1c    5. Anxiety    6. Mild episode of recurrent major depressive disorder (CMS/HCC)    7. Routine gynecological examination  -     Ambulatory Referral to Gynecology    8. Colon cancer screening  -     Ambulatory Referral to Gastroenterology    9. Encounter for screening mammogram for malignant neoplasm of breast  -     Mammo Screening Digital Tomosynthesis Bilateral With CAD    10. Palpitations      Plan:  1.  Benign essential hypertension: Will continue current medication and increase to Cardizem 180 mg p.o. daily for better control of symptoms, low-sodium diet advised, Counseled to regularly check BP at home with goal averaging <130/80.  Previous records reviewed with patient today  2.mixed hyperlipidemia: will obtain   fasting CMP and lipid panel.  Diet and exercise counseled,  Will continue current medications  3. Diabetes  Mellitus  : will obtain   fasting CMP  and hba1c  , diet and exercise counseled ,    4.Atrial fibrillation: rate controlled, on anticoagulation , will increase to Cardizem 180 mg p.o. daily refer to  cardiology , labs reviewed  5.  Palpitations: We will refer patient to cardiology, obtain TSH and increase Cardizem  6.  Anxiety disorder: We will continue Wellbutrin 300 mg daily  7.  Major depression: We will obtain labs and continue Wellbutrin 300 mg daily  8.  Screening for breast cancer: We will schedule mammogram  9.  Screening for colon cancer: We will refer patient to GI for colonoscopy  10 routine GYN exam: We will refer patient to gynecology  I spent 45  minutes caring for Neetu on this date of service. This time includes time spent by me in the following activities:preparing for the visit, reviewing tests, performing a medically appropriate examination and/or evaluation , counseling and educating the patient/family/caregiver, ordering medications, tests, or procedures and documenting information in the medical record         Ines Hancock MD

## 2021-04-28 NOTE — PATIENT INSTRUCTIONS
MyPlate from USDA    MyPlate is an outline of a general healthy diet based on the 2010 Dietary Guidelines for Americans, from the U.S. Department of Agriculture (USDA). It sets guidelines for how much food you should eat from each food group based on your age, sex, and level of physical activity.  What are tips for following MyPlate?  To follow MyPlate recommendations:  · Eat a wide variety of fruits and vegetables, grains, and protein foods.  · Serve smaller portions and eat less food throughout the day.  · Limit portion sizes to avoid overeating.  · Enjoy your food.  · Get at least 150 minutes of exercise every week. This is about 30 minutes each day, 5 or more days per week.  It can be difficult to have every meal look like MyPlate. Think about MyPlate as eating guidelines for an entire day, rather than each individual meal.  Fruits and vegetables  · Make half of your plate fruits and vegetables.  · Eat many different colors of fruits and vegetables each day.  · For a 2,000 calorie daily food plan, eat:  ? 2½ cups of vegetables every day.  ? 2 cups of fruit every day.  · 1 cup is equal to:  ? 1 cup raw or cooked vegetables.  ? 1 cup raw fruit.  ? 1 medium-sized orange, apple, or banana.  ? 1 cup 100% fruit or vegetable juice.  ? 2 cups raw leafy greens, such as lettuce, spinach, or kale.  ? ½ cup dried fruit.  Grains  · One fourth of your plate should be grains.  · Make at least half of the grains you eat each day whole grains.  · For a 2,000 calorie daily food plan, eat 6 oz of grains every day.  · 1 oz is equal to:  ? 1 slice bread.  ? 1 cup cereal.  ? ½ cup cooked rice, cereal, or pasta.  Protein  · One fourth of your plate should be protein.  · Eat a wide variety of protein foods, including meat, poultry, fish, eggs, beans, nuts, and tofu.  · For a 2,000 calorie daily food plan, eat 5½ oz of protein every day.  · 1 oz is equal to:  ? 1 oz meat, poultry, or fish.  ? ¼ cup cooked beans.  ? 1 egg.  ? ½ oz nuts  or seeds.  ? 1 Tbsp peanut butter.  Dairy  · Drink fat-free or low-fat (1%) milk.  · Eat or drink dairy as a side to meals.  · For a 2,000 calorie daily food plan, eat or drink 3 cups of dairy every day.  · 1 cup is equal to:  ? 1 cup milk, yogurt, cottage cheese, or soy milk (soy beverage).  ? 2 oz processed cheese.  ? 1½ oz natural cheese.  Fats, oils, salt, and sugars  · Only small amounts of oils are recommended.  · Avoid foods that are high in calories and low in nutritional value (empty calories), like foods high in fat or added sugars.  · Choose foods that are low in salt (sodium). Choose foods that have less than 140 milligrams (mg) of sodium per serving.  · Drink water instead of sugary drinks. Drink enough water each day to keep your urine pale yellow.  Where to find support  · Work with your health care provider or a nutrition specialist (dietitian) to develop a customized eating plan that is right for you.  · Download an zoila (mobile application) to help you track your daily food intake.  Where to find more information  · Go to ChooseMyPlate.gov for more information.  Summary  · MyPlate is a general guideline for healthy eating from the USDA. It is based on the 2010 Dietary Guidelines for Americans.  · In general, fruits and vegetables should take up ½ of your plate, grains should take up ¼ of your plate, and protein should take up ¼ of your plate.  This information is not intended to replace advice given to you by your health care provider. Make sure you discuss any questions you have with your health care provider.  Document Revised: 05/21/2020 Document Reviewed: 03/19/2018  Elsevier Patient Education © 2021 Elsevier Inc.

## 2021-04-30 ENCOUNTER — TELEPHONE (OUTPATIENT)
Dept: INTERNAL MEDICINE | Facility: CLINIC | Age: 66
End: 2021-04-30

## 2021-04-30 DIAGNOSIS — I48.20 CHRONIC ATRIAL FIBRILLATION (HCC): ICD-10-CM

## 2021-04-30 RX ORDER — DILTIAZEM HYDROCHLORIDE 180 MG/1
180 CAPSULE, COATED, EXTENDED RELEASE ORAL
Qty: 30 CAPSULE | Refills: 5 | Status: SHIPPED | OUTPATIENT
Start: 2021-04-30 | End: 2021-05-24 | Stop reason: SDUPTHER

## 2021-04-30 NOTE — TELEPHONE ENCOUNTER
Caller: Neetu Wick    Relationship: Self    Best call back number: [phone]    Medication needed:   Requested Prescriptions     Pending Prescriptions Disp Refills   • dilTIAZem CD (dilTIAZem CD) 180 MG 24 hr capsule 30 capsule 5     Sig: Take 1 capsule by mouth every night at bedtime.       When do you need the refill by: 4/30/21    What additional details did the patient provide when requesting the medication: PATIENT'S INSURANCE IS REQUIRING SHE USE [facility] NOW SO SHE WAS NOT ABLE TO PICK THIS UP FROM [facility] WHERE IT WAS ORIGINALLY SENT. PATIENT WOULD LIKE TO KNOW IF THIS COULD BE CANCELLED AT [facility] AND [facility]    Does the patient have less than a 3 day supply:  [x] Yes  [] No    What is the patient's preferred pharmacy: [id number] [place], [place] - [facility][note 3] [address] AT [facility] [address] - [phone]  - [phone] FX

## 2021-05-14 LAB
ALBUMIN SERPL-MCNC: 4 G/DL (ref 3.5–5.2)
ALBUMIN/GLOB SERPL: 1.7 G/DL
ALP SERPL-CCNC: 103 U/L (ref 39–117)
ALT SERPL-CCNC: 8 U/L (ref 1–33)
AST SERPL-CCNC: 11 U/L (ref 1–32)
BASOPHILS # BLD AUTO: 0.06 10*3/MM3 (ref 0–0.2)
BASOPHILS NFR BLD AUTO: 1.2 % (ref 0–1.5)
BILIRUB SERPL-MCNC: 0.6 MG/DL (ref 0–1.2)
BUN SERPL-MCNC: 13 MG/DL (ref 8–23)
BUN/CREAT SERPL: 16.5 (ref 7–25)
CALCIUM SERPL-MCNC: 8.9 MG/DL (ref 8.6–10.5)
CHLORIDE SERPL-SCNC: 108 MMOL/L (ref 98–107)
CHOLEST SERPL-MCNC: 135 MG/DL (ref 0–200)
CO2 SERPL-SCNC: 26.3 MMOL/L (ref 22–29)
CREAT SERPL-MCNC: 0.79 MG/DL (ref 0.57–1)
EOSINOPHIL # BLD AUTO: 0.19 10*3/MM3 (ref 0–0.4)
EOSINOPHIL NFR BLD AUTO: 3.9 % (ref 0.3–6.2)
ERYTHROCYTE [DISTWIDTH] IN BLOOD BY AUTOMATED COUNT: 12.5 % (ref 12.3–15.4)
GLOBULIN SER CALC-MCNC: 2.4 GM/DL
GLUCOSE SERPL-MCNC: 74 MG/DL (ref 65–99)
HBA1C MFR BLD: 6 % (ref 4.8–5.6)
HCT VFR BLD AUTO: 38.5 % (ref 34–46.6)
HDLC SERPL-MCNC: 60 MG/DL (ref 40–60)
HGB BLD-MCNC: 12.7 G/DL (ref 12–15.9)
IMM GRANULOCYTES # BLD AUTO: 0.01 10*3/MM3 (ref 0–0.05)
IMM GRANULOCYTES NFR BLD AUTO: 0.2 % (ref 0–0.5)
LDLC SERPL CALC-MCNC: 60 MG/DL (ref 0–100)
LYMPHOCYTES # BLD AUTO: 1.03 10*3/MM3 (ref 0.7–3.1)
LYMPHOCYTES NFR BLD AUTO: 21.1 % (ref 19.6–45.3)
MCH RBC QN AUTO: 31 PG (ref 26.6–33)
MCHC RBC AUTO-ENTMCNC: 33 G/DL (ref 31.5–35.7)
MCV RBC AUTO: 93.9 FL (ref 79–97)
MONOCYTES # BLD AUTO: 0.44 10*3/MM3 (ref 0.1–0.9)
MONOCYTES NFR BLD AUTO: 9 % (ref 5–12)
NEUTROPHILS # BLD AUTO: 3.15 10*3/MM3 (ref 1.7–7)
NEUTROPHILS NFR BLD AUTO: 64.6 % (ref 42.7–76)
NRBC BLD AUTO-RTO: 0 /100 WBC (ref 0–0.2)
PLATELET # BLD AUTO: 260 10*3/MM3 (ref 140–450)
POTASSIUM SERPL-SCNC: 4.7 MMOL/L (ref 3.5–5.2)
PROT SERPL-MCNC: 6.4 G/DL (ref 6–8.5)
RBC # BLD AUTO: 4.1 10*6/MM3 (ref 3.77–5.28)
SODIUM SERPL-SCNC: 145 MMOL/L (ref 136–145)
TRIGL SERPL-MCNC: 73 MG/DL (ref 0–150)
TSH SERPL DL<=0.005 MIU/L-ACNC: 2.08 UIU/ML (ref 0.27–4.2)
VLDLC SERPL CALC-MCNC: 15 MG/DL (ref 5–40)
WBC # BLD AUTO: 4.88 10*3/MM3 (ref 3.4–10.8)

## 2021-05-18 ENCOUNTER — OFFICE VISIT (OUTPATIENT)
Dept: INTERNAL MEDICINE | Facility: CLINIC | Age: 66
End: 2021-05-18

## 2021-05-18 VITALS
OXYGEN SATURATION: 96 % | DIASTOLIC BLOOD PRESSURE: 79 MMHG | BODY MASS INDEX: 41.91 KG/M2 | SYSTOLIC BLOOD PRESSURE: 145 MMHG | HEIGHT: 67 IN | TEMPERATURE: 96.6 F | HEART RATE: 60 BPM | RESPIRATION RATE: 14 BRPM | WEIGHT: 267 LBS

## 2021-05-18 DIAGNOSIS — I10 BENIGN ESSENTIAL HYPERTENSION: Primary | ICD-10-CM

## 2021-05-18 DIAGNOSIS — E11.9 CONTROLLED TYPE 2 DIABETES MELLITUS WITHOUT COMPLICATION, WITHOUT LONG-TERM CURRENT USE OF INSULIN (HCC): ICD-10-CM

## 2021-05-18 DIAGNOSIS — E78.2 MIXED HYPERLIPIDEMIA: ICD-10-CM

## 2021-05-18 DIAGNOSIS — I48.20 CHRONIC ATRIAL FIBRILLATION (HCC): ICD-10-CM

## 2021-05-18 PROCEDURE — 99214 OFFICE O/P EST MOD 30 MIN: CPT | Performed by: INTERNAL MEDICINE

## 2021-05-19 ENCOUNTER — HOSPITAL ENCOUNTER (OUTPATIENT)
Dept: MAMMOGRAPHY | Facility: HOSPITAL | Age: 66
Discharge: HOME | End: 2021-05-19
Admitting: INTERNAL MEDICINE

## 2021-05-19 ENCOUNTER — APPOINTMENT (OUTPATIENT)
Dept: OTHER | Facility: HOSPITAL | Age: 66
End: 2021-05-19

## 2021-05-19 DIAGNOSIS — Z92.89 HX OF MAMMOGRAM: ICD-10-CM

## 2021-05-19 PROCEDURE — 77063 BREAST TOMOSYNTHESIS BI: CPT

## 2021-05-19 PROCEDURE — 77067 SCR MAMMO BI INCL CAD: CPT

## 2021-05-19 PROCEDURE — 77063 BREAST TOMOSYNTHESIS BI: CPT | Performed by: RADIOLOGY

## 2021-05-19 PROCEDURE — 77067 SCR MAMMO BI INCL CAD: CPT | Performed by: RADIOLOGY

## 2021-05-24 RX ORDER — DILTIAZEM HYDROCHLORIDE 180 MG/1
180 CAPSULE, COATED, EXTENDED RELEASE ORAL
Qty: 30 CAPSULE | Refills: 5 | Status: SHIPPED | OUTPATIENT
Start: 2021-05-24 | End: 2021-06-18 | Stop reason: SDUPTHER

## 2021-05-27 ENCOUNTER — OFFICE VISIT (OUTPATIENT)
Dept: OBSTETRICS AND GYNECOLOGY | Facility: CLINIC | Age: 66
End: 2021-05-27

## 2021-05-27 VITALS
WEIGHT: 267 LBS | DIASTOLIC BLOOD PRESSURE: 78 MMHG | HEIGHT: 67 IN | SYSTOLIC BLOOD PRESSURE: 156 MMHG | BODY MASS INDEX: 41.91 KG/M2

## 2021-05-27 DIAGNOSIS — Z01.419 ENCOUNTER FOR GYNECOLOGICAL EXAMINATION WITHOUT ABNORMAL FINDING: Primary | ICD-10-CM

## 2021-05-27 DIAGNOSIS — Z12.39 ENCOUNTER FOR BREAST CANCER SCREENING USING NON-MAMMOGRAM MODALITY: ICD-10-CM

## 2021-05-27 PROCEDURE — G0101 CA SCREEN;PELVIC/BREAST EXAM: HCPCS | Performed by: MIDWIFE

## 2021-05-27 RX ORDER — ESTRADIOL 0.5 MG/1
0.5 TABLET ORAL DAILY
Qty: 90 TABLET | Refills: 3 | Status: SHIPPED | OUTPATIENT
Start: 2021-05-27 | End: 2022-01-25

## 2021-05-28 NOTE — PROGRESS NOTES
"Subjective   Chief Complaint   Patient presents with   • Gynecologic Exam     Last pap 2018, MMG 21 WNL. No complaints     Neetu Wick is a 65 y.o. year old  presenting to be seen for her annual exam.   She was referred by PCP Dr Hancock because she hadn't had an exam in a while.  She had a Hysterectomy in  and BSO . Her hysterectomy was done for endometriosis. She takes Estrogen 0.5 mg daily and wants to continue.  She recently moved to [place] and is a .   She recently had a mammogram.  She denies urinary incontinence issues    Past Medical History:   Diagnosis Date   • Afib (CMS/HCC)    • Depression    • Dyslipidemia    • Fracture    • Fracture, radius 2015    left   • Hypertension    • Incontinence    • Obesity    • Ovarian failure    • Panic attacks    • Pedal edema    • Urinary tract infection    • Varicose vein of leg    • Venous insufficiency    • Vitamin D deficiency       Past Surgical History:   Procedure Laterality Date   • ABDOMINAL HYSTERECTOMY      Total   • BILATERAL SALPINGO OOPHORECTOMY     • BLADDER SUSPENSION     • COLONOSCOPY  2015   • ENDOVENOUS ABLATION SAPHENOUS VEIN W/ LASER Bilateral    • OOPHORECTOMY Bilateral    • WISDOM TOOTH EXTRACTION            The following portions of the patient's history were reviewed and updated as appropriate:problem list, current medications, allergies, past family history, past medical history, past social history and past surgical history.       Objective   /78   Ht 170.2 cm (67\")   Wt 121 kg (267 lb)   BMI 41.82 kg/m²     Physical Exam   Constitutional   The patient is awake, alert, well developed, well nourished and well groomed.   Neck   The neck is supple and the trachea is midline. The thyroid is not enlarged and there are no palpable nodules.   Breast  Inspection of the breast reveals symmetrical and smooth breast bilaterally without skin color changes, lesions, dimpling or skin " retraction.  The nipples are  everted and without discharge.  Palpation of the breast the tissue is non-tender, smooth, without masses.  The axillae are without masses.   Respiratory  The patient is relaxed and breathes without effort. Lungs CTAB  Cardiovascular  Heart regular rate and rhythm without murmur.  Gastrointestinal   The abdomen is soft and non-tender.  No hepatosplenomegaly.  Genitourinary   Pelvic: External genitalia:  normal appearance of the external genitalia including Bartholin's and Mahnomen's glands. No erythema, lesions, or masses  :  urethral meatus normal; no erythema, edema, prolapse, or lesions  Cervix:  absent.  Uterus:  absent.  Adnexa:  absent, bilateral.   -Bladder - Palpation at the region above the symphysis pubis             reveals no bladder tenderness.   -Vagina - There is no excessive vaginal discharge   There is no evidence of pelvic relaxation; there is no cystocele or rectocele.  Perineum is without inflammation or lesions   Extremities  Full ROM. No cyanosis or edema   Skin  Normal in color, texture, moisture, temperature, mobility and turgor. There are no abnormal lesions.    Psychiatric  The patient is oriented to person, place, and time. Speech is fluent and words are clear          Assessment /Plan      Diagnoses and all orders for this visit:    1. Encounter for gynecological examination without abnormal finding (Primary)       I explained to Neetu that the Pap smears are no longer recommended in patients after hysterectomy unless the patient has a history of ANNEMARIE 2 or higher in the past 20 years or have a history of cervical cancer.    I stressed to Neetu that she still should be seen to be seen yearly for a full physical including breast and pelvic exam. In women with no risk factors as noted, cytology screening has a small chance of detecting an abnormality and primary vaginal cancer is a rare gynecologic malignancy.  For this reason, Neetu has declined pap smear screening  this year.  I will refill her Estrogen prescription x 1 year  2. Encounter for breast cancer screening using non-mammogram modality  I reviewed her mammogram report which is in the chart  Other orders  -     estradiol (ESTRACE) 0.5 MG tablet; Take 1 tablet by mouth Daily.  Dispense: 90 tablet; Refill: 3             This note was electronically signed.    Leny Branham CNM   May 28, 2021

## 2021-06-01 VITALS — BODY MASS INDEX: 41.22 KG/M2 | WEIGHT: 256.5 LBS | HEIGHT: 66 IN

## 2021-06-01 VITALS — BODY MASS INDEX: 41.62 KG/M2 | HEIGHT: 66 IN | WEIGHT: 259 LBS

## 2021-06-01 VITALS — BODY MASS INDEX: 40.65 KG/M2 | WEIGHT: 259 LBS | HEIGHT: 67 IN

## 2021-06-01 VITALS — WEIGHT: 259 LBS | BODY MASS INDEX: 40.65 KG/M2 | HEIGHT: 67 IN

## 2021-06-02 VITALS — WEIGHT: 259 LBS | BODY MASS INDEX: 41.62 KG/M2 | HEIGHT: 66 IN

## 2021-06-07 ENCOUNTER — CONSULT (OUTPATIENT)
Dept: CARDIOLOGY | Facility: CLINIC | Age: 66
End: 2021-06-07

## 2021-06-07 VITALS
SYSTOLIC BLOOD PRESSURE: 142 MMHG | HEART RATE: 65 BPM | WEIGHT: 265 LBS | HEIGHT: 67 IN | BODY MASS INDEX: 41.59 KG/M2 | DIASTOLIC BLOOD PRESSURE: 84 MMHG | OXYGEN SATURATION: 96 %

## 2021-06-07 DIAGNOSIS — I48.0 PAROXYSMAL ATRIAL FIBRILLATION (HCC): Primary | ICD-10-CM

## 2021-06-07 DIAGNOSIS — I10 ESSENTIAL HYPERTENSION: ICD-10-CM

## 2021-06-07 DIAGNOSIS — E78.2 MIXED HYPERLIPIDEMIA: ICD-10-CM

## 2021-06-07 PROCEDURE — 99203 OFFICE O/P NEW LOW 30 MIN: CPT | Performed by: INTERNAL MEDICINE

## 2021-06-07 PROCEDURE — 93000 ELECTROCARDIOGRAM COMPLETE: CPT | Performed by: INTERNAL MEDICINE

## 2021-06-07 NOTE — PROGRESS NOTES
[facility] Cardiology  Consultation H&P  Neetu Wick  1955  [address]  [address]  [facility] [place] [address]     VISIT DATE:  06/07/21    PCP: Ines Hancock MD  [address]  [facility] [address]    IDENTIFICATION: A 65 y.o. female  originally from [place]    PROBLEM LIST:  Atrial fibrillation  2017-initial presentation.    2016 echo (OSH) EF wnl, no valvulopathy- DD  CAD   5/18 [facility] OSH-normal perfusion EF not consulted  HL 5/21 135/73/60/60 A1c 6  HTn  Bilat GSV ablation OSH    CC:  Chief Complaint   Patient presents with   • Irregular Heart Beat   • Edema       Allergies  Allergies   Allergen Reactions   • Codeine Itching       Current Medications    Current Outpatient Medications:   •  atorvastatin (LIPITOR) 20 MG tablet, Take 20 mg by mouth Daily., Disp: , Rfl:   •  buPROPion XL (WELLBUTRIN XL) 300 MG 24 hr tablet, Take 300 mg by mouth Daily., Disp: , Rfl:   •  clotrimazole (LOTRIMIN) 1 % cream, Apply  topically to the appropriate area as directed Daily., Disp: , Rfl:   •  dilTIAZem CD (dilTIAZem CD) 180 MG 24 hr capsule, Take 1 capsule by mouth every night at bedtime., Disp: 30 capsule, Rfl: 5  •  estradiol (ESTRACE) 0.5 MG tablet, Take 1 tablet by mouth Daily., Disp: 90 tablet, Rfl: 3  •  Loratadine 10 MG capsule, Take 10 mg by mouth Daily., Disp: , Rfl:   •  Metoprolol Succinate 25 MG capsule extended-release 24 hour sprinkle, Take 25 mg by mouth Daily., Disp: , Rfl:   •  rivaroxaban (XARELTO) 20 MG tablet, Take 20 mg by mouth Every Night., Disp: , Rfl:      History of Present Illness   HPI  Neetu Wick is a 65 y.o. year old female with the above mentioned PMH who presents for consult from Dr Hancock evaluation of cardiovascular disease  [place] with [place] atrial fibrillation self attest that she has poor dietary compliance.  She does activities of daily living but no regular exercise regimen.  She has noted some mild fatigue of  late.  Recent increase of diltiazem she is noted with some increased lower extremity edema      ROS  Review of Systems   Constitutional: Positive for malaise/fatigue. Negative for chills, fever, night sweats, weight gain and weight loss.   HENT: Negative for hearing loss and nosebleeds.    Eyes: Negative for blurred vision, vision loss in left eye, vision loss in right eye, visual disturbance and visual halos.   Cardiovascular: Negative for chest pain, claudication, cyanosis, dyspnea on exertion, irregular heartbeat, leg swelling, near-syncope, orthopnea, palpitations, paroxysmal nocturnal dyspnea and syncope.   Respiratory: Negative for cough, hemoptysis, shortness of breath, snoring and wheezing.    Endocrine: Negative for cold intolerance, heat intolerance, polydipsia, polyphagia and polyuria.   Hematologic/Lymphatic: Negative for adenopathy and bleeding problem. Does not bruise/bleed easily.   Skin: Negative for dry skin, poor wound healing and rash.   Musculoskeletal: Positive for joint swelling. Negative for falls, joint pain, muscle cramps, muscle weakness, myalgias and neck pain.   Gastrointestinal: Negative for bloating, abdominal pain, change in bowel habit, bowel incontinence, constipation, diarrhea, dysphagia, excessive appetite, heartburn, hematemesis, hematochezia, jaundice, melena, nausea and vomiting.   Genitourinary: Negative for bladder incontinence, dysuria, flank pain, hematuria, hesitancy and nocturia.   Neurological: Negative for aphonia, excessive daytime sleepiness, dizziness, focal weakness, headaches, light-headedness, loss of balance, seizures, sensory change, tremors, vertigo and weakness.   Psychiatric/Behavioral: Negative for altered mental status, depression, memory loss, substance abuse and suicidal ideas. The patient is not nervous/anxious.        SOCIAL HX  Social History     Socioeconomic History   • Marital status:      Spouse name: Not on file   • Number of children: Not  "on file   • Years of education: Not on file   • Highest education level: Not on file   Tobacco Use   • Smoking status: Former Smoker     Quit date: 1986     Years since quittin.0   • Smokeless tobacco: Never Used   Vaping Use   • Vaping Use: Never used   Substance and Sexual Activity   • Alcohol use: Not Currently     Comment: recovering alcoholic   • Drug use: Never   • Sexual activity: Defer       FAMILY HX  Family History   Problem Relation Age of Onset   • Hypertension Mother    • Obesity Mother    • Hypertension Father    • Dementia Father    • Atrial fibrillation Father    • Obesity Sister    • Cancer Maternal Aunt         breast   • Breast cancer Maternal Aunt    • Cancer Maternal Grandmother         breast   • Breast cancer Maternal Grandmother        Vitals:    21 1020   BP: 142/84   BP Location: Right arm   Patient Position: Sitting   Pulse: 65   SpO2: 96%   Weight: 120 kg (265 lb)   Height: 170.2 cm (67\")     Body mass index is 41.5 kg/m².     PHYSICAL EXAMINATION:  Constitutional:       Appearance: Healthy appearance. Not in distress.   Neck:      Vascular: No JVR. JVD normal.   Pulmonary:      Effort: Pulmonary effort is normal.      Breath sounds: Normal breath sounds. No wheezing. No rhonchi. No rales.   Chest:      Chest wall: Not tender to palpatation.   Cardiovascular:      PMI at left midclavicular line. Normal rate. Regular rhythm. Normal S1. Normal S2.      Murmurs: There is no murmur.      No gallop. No click. No rub.   Pulses:     Intact distal pulses.   Edema:     Thigh: bilateral edema of the thigh with pitting on the right with no pitting on the left.     Pretibial: bilateral non-pitting edema of the pretibial area.     Ankle: bilateral 2+ pitting edema of the ankle.     Feet: bilateral 2+ pitting edema of the feet.  Abdominal:      General: Bowel sounds are normal.      Palpations: Abdomen is soft.      Tenderness: There is no abdominal tenderness.   Musculoskeletal: Normal " range of motion.         General: No tenderness. Skin:     General: Skin is warm and dry.   Neurological:      General: No focal deficit present.      Mental Status: Alert and oriented to person, place and time.         Diagnostic Data:    ECG 12 Lead    Date/Time: 6/7/2021 11:48 AM  Performed by: Pipo Joe MD  Authorized by: Pipo Joe MD   Previous ECG: no previous ECG available  Rhythm: sinus bradycardia  BPM: 59    Clinical impression: non-specific ECG          Lab Results   Component Value Date    CHLPL 135 05/13/2021    TRIG 73 05/13/2021    HDL 60 05/13/2021     Lab Results   Component Value Date    BUN 13 05/13/2021    CREATININE 0.79 05/13/2021     05/13/2021    K 4.7 05/13/2021     (H) 05/13/2021    CO2 26.3 05/13/2021     Lab Results   Component Value Date    HGBA1C 6.00 (H) 05/13/2021     Lab Results   Component Value Date    WBC 4.88 05/13/2021    HGB 12.7 05/13/2021    HCT 38.5 05/13/2021     05/13/2021       ASSESSMENT:   Diagnosis Plan   1. Paroxysmal atrial fibrillation (CMS/HCC)     2. Essential hypertension     3. Mixed hyperlipidemia         PLAN:  Paroxysmal A. fib maintaining sinus mechanism rate controlled with dual AV oracio blocking agent.  Question as to whether increased rate dose of diltiazem as resulted with worsened lower extremity edema.  She has had prior venous insufficiency procedure bilaterally with chronic swelling  Recommended daily leg exercises she states that she has access to a foot bike    Dyslipidemia on statin therapy    Hypertension controlled on metoprolol diltiazem,    Dietary discretion recommended with carbohydrate restriction intermittent fasting        No ref. provider found, thank you for referring Ms. Wick for evaluation.  I have forwarded my electronically generated recommendations to you for review.  Please do not hesitate to call with any questions.      Pipo Joe MD, [facility]

## 2021-06-14 ENCOUNTER — OFFICE VISIT (OUTPATIENT)
Dept: GASTROENTEROLOGY | Facility: CLINIC | Age: 66
End: 2021-06-14

## 2021-06-14 VITALS
BODY MASS INDEX: 41.81 KG/M2 | HEART RATE: 61 BPM | HEIGHT: 67 IN | SYSTOLIC BLOOD PRESSURE: 174 MMHG | DIASTOLIC BLOOD PRESSURE: 77 MMHG | TEMPERATURE: 96.8 F | WEIGHT: 266.4 LBS

## 2021-06-14 DIAGNOSIS — Z86.010 PERSONAL HISTORY OF COLONIC POLYPS: Primary | ICD-10-CM

## 2021-06-14 DIAGNOSIS — E66.01 CLASS 3 SEVERE OBESITY DUE TO EXCESS CALORIES WITHOUT SERIOUS COMORBIDITY WITH BODY MASS INDEX (BMI) OF 40.0 TO 44.9 IN ADULT (HCC): ICD-10-CM

## 2021-06-14 PROCEDURE — 99203 OFFICE O/P NEW LOW 30 MIN: CPT | Performed by: INTERNAL MEDICINE

## 2021-06-14 RX ORDER — SODIUM CHLORIDE 9 MG/ML
70 INJECTION, SOLUTION INTRAVENOUS CONTINUOUS PRN
Status: CANCELLED | OUTPATIENT
Start: 2021-06-14

## 2021-06-14 RX ORDER — SODIUM, POTASSIUM,MAG SULFATES 17.5-3.13G
2 SOLUTION, RECONSTITUTED, ORAL ORAL ONCE
Qty: 354 ML | Refills: 0 | Status: SHIPPED | OUTPATIENT
Start: 2021-06-14 | End: 2021-06-14

## 2021-06-14 NOTE — PROGRESS NOTES
New Patient Consult      Date: 2021   Patient Name: Neetu Wick  MRN: [id number]  : 1955     Referring Physician: Ines Hancock MD    Chief Complaint   Patient presents with   • Colon Cancer Screening       History of Present Illness: Neetu Wick is a 65 y.o. female who is here today to establish care with Gastroenterology for colon cancer screening.     This patient deny any abdominal pain, change in bowel habit, hematochezia or melena.  Weight is stable. Pt denies nausea vomiting or odynophagia or dysphagia. There is no history of acid reflux. There is no history of anemia. No prior history of EGD.  Her last colonoscopy was in  at [place] and polyps removed as per patient.  She was been told it was tubular adenoma. No family history of colon cancer or any GI malignancy. No history of any abdominal surgery except hysterectomy and bilateral salpingo-oophorectomy. Denies alcohol abuse or cigarette smoking. Maternal grandmother had breast CA, Aunt lung CA.     Patient has a chronic atrial fibrillation and she is on Xarelto.      Subjective      Past Medical History:   Past Medical History:   Diagnosis Date   • Afib (CMS/HCC)    • Depression    • Dyslipidemia    • Fracture    • Fracture, radius 2015    left   • Hypertension    • Incontinence    • Obesity    • Ovarian failure    • Panic attacks    • Pedal edema    • Urinary tract infection    • Varicose vein of leg    • Venous insufficiency    • Vitamin D deficiency        Past Surgical History:   Past Surgical History:   Procedure Laterality Date   • ABDOMINAL HYSTERECTOMY      Total   • BILATERAL SALPINGO OOPHORECTOMY     • BLADDER SUSPENSION     • COLONOSCOPY  2015   • ENDOVENOUS ABLATION SAPHENOUS VEIN W/ LASER Bilateral    • OOPHORECTOMY Bilateral    • WISDOM TOOTH EXTRACTION         Family History:   Family History   Problem Relation Age of Onset   • Hypertension Mother    • Obesity Mother    •  Hypertension Father    • Dementia Father    • Atrial fibrillation Father    • Obesity Sister    • Cancer Maternal Aunt         breast   • Breast cancer Maternal Aunt    • Cancer Maternal Grandmother         breast   • Breast cancer Maternal Grandmother        Social History:   Social History     Socioeconomic History   • Marital status:      Spouse name: Not on file   • Number of children: Not on file   • Years of education: Not on file   • Highest education level: Not on file   Tobacco Use   • Smoking status: Former Smoker     Quit date: 1986     Years since quittin.0   • Smokeless tobacco: Never Used   Vaping Use   • Vaping Use: Never used   Substance and Sexual Activity   • Alcohol use: Not Currently     Comment: recovering alcoholic   • Drug use: Never   • Sexual activity: Defer         Current Outpatient Medications:   •  atorvastatin (LIPITOR) 20 MG tablet, Take 20 mg by mouth Daily., Disp: , Rfl:   •  buPROPion XL (WELLBUTRIN XL) 300 MG 24 hr tablet, Take 300 mg by mouth Daily., Disp: , Rfl:   •  clotrimazole (LOTRIMIN) 1 % cream, Apply  topically to the appropriate area as directed Daily., Disp: , Rfl:   •  dilTIAZem CD (dilTIAZem CD) 180 MG 24 hr capsule, Take 1 capsule by mouth every night at bedtime., Disp: 30 capsule, Rfl: 5  •  estradiol (ESTRACE) 0.5 MG tablet, Take 1 tablet by mouth Daily., Disp: 90 tablet, Rfl: 3  •  Loratadine 10 MG capsule, Take 10 mg by mouth Daily., Disp: , Rfl:   •  Metoprolol Succinate 25 MG capsule extended-release 24 hour sprinkle, Take 25 mg by mouth Daily., Disp: , Rfl:   •  rivaroxaban (XARELTO) 20 MG tablet, Take 20 mg by mouth Every Night., Disp: , Rfl:   •  sodium-potassium-magnesium sulfates (Suprep Bowel Prep Kit) 17.5-3.13-1.6 GM/177ML solution oral solution, Take 2 bottles by mouth 1 (One) Time for 1 dose. Please see prep instructions from office., Disp: 354 mL, Rfl: 0    Allergies   Allergen Reactions   • Codeine Itching       Review of Systems:  "  Review of Systems   Constitutional: Negative for appetite change, fatigue, fever and unexpected weight loss.   HENT: Negative for trouble swallowing.    Respiratory: Negative for cough, shortness of breath and wheezing.    Cardiovascular: Negative for chest pain, palpitations and leg swelling.   Gastrointestinal: Negative for abdominal distention, abdominal pain, anal bleeding, blood in stool, constipation, diarrhea, nausea, rectal pain, vomiting, GERD and indigestion.   Genitourinary: Negative for dysuria, frequency and hematuria.   Musculoskeletal: Negative for back pain and joint swelling.   Skin: Negative for color change, rash and skin lesions.   Neurological: Negative for dizziness, syncope, speech difficulty, weakness, headache and memory problem.   Hematological: Negative for adenopathy. Does not bruise/bleed easily.   Psychiatric/Behavioral: Negative for agitation, behavioral problems, suicidal ideas and depressed mood.     I reviewed review of systems today.    The following portions of the patient's history were reviewed and updated as appropriate: allergies, current medications, past family history, past medical history, past social history, past surgical history and problem list.    Objective     Physical Exam:  Vital Signs:   Vitals:    06/14/21 1505   BP: 174/77   Pulse: 61   Temp: 96.8 °F (36 °C)   TempSrc: Temporal   Weight: 121 kg (266 lb 6.4 oz)   Height: 170.2 cm (67\")       Physical Exam  Vitals and nursing note reviewed.   Constitutional:       Appearance: She is well-developed. She is obese.   Eyes:      Conjunctiva/sclera: Conjunctivae normal.   Cardiovascular:      Rate and Rhythm: Normal rate and regular rhythm.      Heart sounds: No murmur heard.     Pulmonary:      Effort: Pulmonary effort is normal. No respiratory distress.      Breath sounds: Normal breath sounds.   Abdominal:      General: Bowel sounds are normal. There is no distension.      Palpations: Abdomen is soft. There is no " mass.      Tenderness: There is no abdominal tenderness.      Hernia: No hernia is present.   Musculoskeletal:      Cervical back: Neck supple.   Lymphadenopathy:      Cervical: No cervical adenopathy.   Skin:     General: Skin is warm and dry.   Neurological:      General: No focal deficit present.      Mental Status: She is alert and oriented to person, place, and time.      Sensory: No sensory deficit.         Results Review:   I have reviewed the patient's new clinical and imaging results and agree with the interpretation.     Office Visit on 04/28/2021   Component Date Value Ref Range Status   • WBC 05/13/2021 4.88  3.40 - 10.80 10*3/mm3 Final   • RBC 05/13/2021 4.10  3.77 - 5.28 10*6/mm3 Final   • Hemoglobin 05/13/2021 12.7  12.0 - 15.9 g/dL Final   • Hematocrit 05/13/2021 38.5  34.0 - 46.6 % Final   • MCV 05/13/2021 93.9  79.0 - 97.0 fL Final   • MCH 05/13/2021 31.0  26.6 - 33.0 pg Final   • MCHC 05/13/2021 33.0  31.5 - 35.7 g/dL Final   • RDW 05/13/2021 12.5  12.3 - 15.4 % Final   • Platelets 05/13/2021 260  140 - 450 10*3/mm3 Final   • Neutrophil Rel % 05/13/2021 64.6  42.7 - 76.0 % Final   • Lymphocyte Rel % 05/13/2021 21.1  19.6 - 45.3 % Final   • Monocyte Rel % 05/13/2021 9.0  5.0 - 12.0 % Final   • Eosinophil Rel % 05/13/2021 3.9  0.3 - 6.2 % Final   • Basophil Rel % 05/13/2021 1.2  0.0 - 1.5 % Final   • Neutrophils Absolute 05/13/2021 3.15  1.70 - 7.00 10*3/mm3 Final   • Lymphocytes Absolute 05/13/2021 1.03  0.70 - 3.10 10*3/mm3 Final   • Monocytes Absolute 05/13/2021 0.44  0.10 - 0.90 10*3/mm3 Final   • Eosinophils Absolute 05/13/2021 0.19  0.00 - 0.40 10*3/mm3 Final   • Basophils Absolute 05/13/2021 0.06  0.00 - 0.20 10*3/mm3 Final   • Immature Granulocyte Rel % 05/13/2021 0.2  0.0 - 0.5 % Final   • Immature Grans Absolute 05/13/2021 0.01  0.00 - 0.05 10*3/mm3 Final   • nRBC 05/13/2021 0.0  0.0 - 0.2 /100 WBC Final   • Glucose 05/13/2021 74  65 - 99 mg/dL Final   • BUN 05/13/2021 13  8 - 23 mg/dL  Final   • Creatinine 05/13/2021 0.79  0.57 - 1.00 mg/dL Final   • eGFR Non  Am 05/13/2021 73  >60 mL/min/1.73 Final    Comment: GFR Normal >60  Chronic Kidney Disease <60  Kidney Failure <15     • eGFR  Am 05/13/2021 88  >60 mL/min/1.73 Final   • BUN/Creatinine Ratio 05/13/2021 16.5  7.0 - 25.0 Final   • Sodium 05/13/2021 145  136 - 145 mmol/L Final   • Potassium 05/13/2021 4.7  3.5 - 5.2 mmol/L Final   • Chloride 05/13/2021 108* 98 - 107 mmol/L Final   • Total CO2 05/13/2021 26.3  22.0 - 29.0 mmol/L Final   • Calcium 05/13/2021 8.9  8.6 - 10.5 mg/dL Final   • Total Protein 05/13/2021 6.4  6.0 - 8.5 g/dL Final   • Albumin 05/13/2021 4.00  3.50 - 5.20 g/dL Final   • Globulin 05/13/2021 2.4  gm/dL Final   • A/G Ratio 05/13/2021 1.7  g/dL Final   • Total Bilirubin 05/13/2021 0.6  0.0 - 1.2 mg/dL Final   • Alkaline Phosphatase 05/13/2021 103  39 - 117 U/L Final   • AST (SGOT) 05/13/2021 11  1 - 32 U/L Final   • ALT (SGPT) 05/13/2021 8  1 - 33 U/L Final   • Total Cholesterol 05/13/2021 135  0 - 200 mg/dL Final    Comment: Cholesterol Reference Ranges  (U.S. Department of Health and Human Services ATP III  Classifications)  Desirable          <200 mg/dL  Borderline High    200-239 mg/dL  High Risk          >240 mg/dL  Triglyceride Reference Ranges  (U.S. Department of Health and Human Services ATP III  Classifications)  Normal           <150 mg/dL  Borderline High  150-199 mg/dL  High             200-499 mg/dL  Very High        >500 mg/dL  HDL Reference Ranges  (U.S. Department of Health and Human Services ATP III  Classifcations)  Low     <40 mg/dl (major risk factor for CHD)  High    >60 mg/dl ('negative' risk factor for CHD)  LDL Reference Ranges  (U.S. Department of Health and Human Services ATP III  Classifcations)  Optimal          <100 mg/dL  Near Optimal     100-129 mg/dL  Borderline High  130-159 mg/dL  High             160-189 mg/dL  Very High        >189 mg/dL     • Triglycerides 05/13/2021 73  0  - 150 mg/dL Final   • HDL Cholesterol 05/13/2021 60  40 - 60 mg/dL Final   • VLDL Cholesterol Juan Miguel 05/13/2021 15  5 - 40 mg/dL Final   • LDL Chol Calc ([facility]) 05/13/2021 60  0 - 100 mg/dL Final   • Hemoglobin A1C 05/13/2021 6.00* 4.80 - 5.60 % Final    Comment: Hemoglobin A1C Ranges:  Increased Risk for Diabetes  5.7% to 6.4%  Diabetes                     >= 6.5%  Diabetic Goal                < 7.0%     • TSH 05/13/2021 2.080  0.270 - 4.200 uIU/mL Final      Mammo Screening Digital Tomosynthesis Bilateral With CAD    Result Date: 5/21/2021  No mammographic findings suspicious for malignancy.  RECOMMENDATION:  Continue annual screening mammography.   BI-RADS CATEGORY 1, NEGATIVE.   CAD was utilized.  The standard false-negative rate of mammography is between 10% and 25%. Complex patterns or increased breast density will markedly elevate the false-negative rate of mammography.    A letter, in lay terminology, with the results of this exam will be mailed to the patient.    This report was finalized on 5/21/2021 4:28 PM by Dr. Audra Long MD.        Assessment / Plan      Assessment & Plan:  1. Personal history of colonic polyps  Last colonoscopy was in 2015 at [place] and polyps removed.  Details unknown however she was been told that it was tubular adenoma and she needs repeat colonoscopy in 5 years time.  She denies any current GI symptoms.  Is due for surveillance colonoscopy will schedule the scan.    We will hold her Xarelto 3 days before the test    The indications, technique, alternatives and potential risk and complications were discussed with the patient including but not limited to bleeding, bowel perforations, missing lesions and anesthetic complications. The patient understands and wishes to proceed with the procedure and has given their verbal consent. Written patient education information was given to the patient.   The patient will call if they have further questions before procedure.     - Case  Request; Standing  - Implement Anesthesia Orders Day of Procedure; Standing  - Obtain Informed Consent; Standing  - Verify Bowel Prep Was Successful; Standing  - Oxygen Therapy- Nasal Cannula; 2 LPM; Titrate for SPO2: equal to or greater than, 90%; Standing  - sodium chloride 0.9 % infusion  - Case Request    2. Class 3 severe obesity due to excess calories without serious comorbidity with body mass index (BMI) of 40.0 to 44.9 in adult (CMS/HCC)  Her recent lab work reviewed and has normal liver enzymes.  No recent imaging studies to review.   Given her obesity she may have nonalcoholic fatty liver disease  We had a detailed discussion on dietary changes lifestyle changes and try to lose some weight to prevent the development of any fatty liver disease and progression.  I have also given details on Mediterranean diet.        Follow Up:   Return for Follow Up after procedure.    Adelso Plummer MD  [facility] [place]  6/14/2021  15:52 EDT    Please note that portions of this note may have been completed with a voice recognition program.

## 2021-06-14 NOTE — PATIENT INSTRUCTIONS
Mediterranean Diet  A Mediterranean diet refers to food and lifestyle choices that are based on the traditions of countries located on the [place]. This way of eating has been shown to help prevent certain conditions and improve outcomes for people who have chronic diseases, like kidney disease and heart disease.  What are tips for following this plan?  Lifestyle  · Cook and eat meals together with your family, when possible.  · Drink enough fluid to keep your urine clear or pale yellow.  · Be physically active every day. This includes:  ? Aerobic exercise like running or swimming.  ? Leisure activities like gardening, walking, or housework.  · Get 7-8 hours of sleep each night.  · If recommended by your health care provider, drink red wine in moderation. This means 1 glass a day for nonpregnant women and 2 glasses a day for men. A glass of wine equals 5 oz (150 mL).  Reading food labels    · Check the serving size of packaged foods. For foods such as rice and pasta, the serving size refers to the amount of cooked product, not dry.  · Check the total fat in packaged foods. Avoid foods that have saturated fat or trans fats.  · Check the ingredients list for added sugars, such as corn syrup.  Shopping  · At the grocery store, buy most of your food from the areas near the walls of the store. This includes:  ? Fresh fruits and vegetables (produce).  ? Grains, beans, nuts, and seeds. Some of these may be available in unpackaged forms or large amounts (in bulk).  ? Fresh seafood.  ? Poultry and eggs.  ? Low-fat dairy products.  · Buy whole ingredients instead of prepackaged foods.  · Buy fresh fruits and vegetables in-season from local farmers markets.  · Buy frozen fruits and vegetables in resealable bags.  · If you do not have access to quality fresh seafood, buy precooked frozen shrimp or canned fish, such as tuna, salmon, or sardines.  · Buy small amounts of raw or cooked vegetables, salads, or olives from  the deli or salad bar at your store.  · Stock your pantry so you always have certain foods on hand, such as olive oil, canned tuna, canned tomatoes, rice, pasta, and beans.  Cooking  · Cook foods with extra-virgin olive oil instead of using butter or other vegetable oils.  · Have meat as a side dish, and have vegetables or grains as your main dish. This means having meat in small portions or adding small amounts of meat to foods like pasta or stew.  · Use beans or vegetables instead of meat in common dishes like chili or lasagna.  · Zemple with different cooking methods. Try roasting or broiling vegetables instead of steaming or sautéeing them.  · Add frozen vegetables to soups, stews, pasta, or rice.  · Add nuts or seeds for added healthy fat at each meal. You can add these to yogurt, salads, or vegetable dishes.  · Marinate fish or vegetables using olive oil, lemon juice, garlic, and fresh herbs.  Meal planning    · Plan to eat 1 vegetarian meal one day each week. Try to work up to 2 vegetarian meals, if possible.  · Eat seafood 2 or more times a week.  · Have healthy snacks readily available, such as:  ? Vegetable sticks with hummus.  ? Greek yogurt.  ? Fruit and nut trail mix.  · Eat balanced meals throughout the week. This includes:  ? Fruit: 2-3 servings a day  ? Vegetables: 4-5 servings a day  ? Low-fat dairy: 2 servings a day  ? Fish, poultry, or lean meat: 1 serving a day  ? Beans and legumes: 2 or more servings a week  ? Nuts and seeds: 1-2 servings a day  ? Whole grains: 6-8 servings a day  ? Extra-virgin olive oil: 3-4 servings a day  · Limit red meat and sweets to only a few servings a month  What are my food choices?  · Mediterranean diet  ? Recommended  § Grains: Whole-grain pasta. Brown rice. Bulgar wheat. Polenta. Couscous. Whole-wheat bread. Oatmeal. Quinoa.  § Vegetables: Artichokes. Beets. Broccoli. Cabbage. Carrots. Eggplant. Green beans. Chard. Kale. Spinach. Onions. Leeks. Peas. Squash.  Tomatoes. Peppers. Radishes.  § Fruits: Apples. Apricots. Avocado. Berries. Bananas. Cherries. Dates. Figs. Grapes. Jacki. Melon. Oranges. Peaches. Plums. Pomegranate.  § Meats and other protein foods: Beans. Almonds. Sunflower seeds. Pine nuts. Peanuts. Cod. [place]. Scallops. Shrimp. Tuna. Tilapia. Clams. Oysters. Eggs.  § Dairy: Low-fat milk. Cheese. Greek yogurt.  § Beverages: Water. Red wine. Herbal tea.  § Fats and oils: Extra virgin olive oil. Avocado oil. Grape seed oil.  § Sweets and desserts: Greek yogurt with honey. Baked apples. Poached pears. Trail mix.  § Seasoning and other foods: Basil. Cilantro. Coriander. Cumin. Mint. Parsley. Derek. Rosemary. Tarragon. Garlic. Oregano. Thyme. Pepper. Balsalmic vinegar. Tahini. Hummus. Tomato sauce. Olives. Mushrooms.  ? Limit these  § Grains: Prepackaged pasta or rice dishes. Prepackaged cereal with added sugar.  § Vegetables: Deep fried potatoes (french fries).  § Fruits: Fruit canned in syrup.  § Meats and other protein foods: Beef. Pork. Lamb. Poultry with skin. Hot dogs. Zepeda.  § Dairy: Ice cream. Sour cream. Whole milk.  § Beverages: Juice. Sugar-sweetened soft drinks. Beer. Liquor and spirits.  § Fats and oils: Butter. Canola oil. Vegetable oil. Beef fat (tallow). Lard.  § Sweets and desserts: Cookies. Cakes. Pies. Candy.  § Seasoning and other foods: Mayonnaise. Premade sauces and marinades.  The items listed may not be a complete list. Talk with your dietitian about what dietary choices are right for you.  Summary  · The Mediterranean diet includes both food and lifestyle choices.  · Eat a variety of fresh fruits and vegetables, beans, nuts, seeds, and whole grains.  · Limit the amount of red meat and sweets that you eat.  · Talk with your health care provider about whether it is safe for you to drink red wine in moderation. This means 1 glass a day for nonpregnant women and 2 glasses a day for men. A glass of wine equals 5 oz (150 mL).  This information  is not intended to replace advice given to you by your health care provider. Make sure you discuss any questions you have with your health care provider.  Document Revised: 08/17/2017 Document Reviewed: 08/10/2017  Elsevier Patient Education © 2020 [employer] Inc.

## 2021-06-15 PROBLEM — Z86.0100 PERSONAL HISTORY OF COLONIC POLYPS: Status: ACTIVE | Noted: 2021-06-15

## 2021-06-15 PROBLEM — Z86.010 PERSONAL HISTORY OF COLONIC POLYPS: Status: ACTIVE | Noted: 2021-06-15

## 2021-06-17 NOTE — PROGRESS NOTES
Assessment & Plan   1. Symptomatic states associated with artificial menopause:  Discussed trial off of Climara as she is now > 10 years past average age of menopause.  Advised on potential risks with long-term use, she does have a FMH of breast cancer in her maternal aunt and MGM.  Decrease to 0.0375 x 3m.  If tolerated, plan to decrease to 0.025 at that time.    - estradiol (CLIMARA) 0.0375 mg/24 hr; Place 1 patch on the skin every 7 days.  Dispense: 12 patch; Refill: 1    2. Atrial fibrillation with RVR:  Unclear if episodes may represent persistent atrial fibrillation or anxiety.  She never followed up with cardiology following diagnosis, referral placed today.   - Ambulatory referral to Cardiology    3. HTN (hypertension)    4. Elevated fasting blood sugar:  Previous elevated fasting glucose though no A1C checked.  In pre-diabetes range today at 5.7.  Message sent advising regular activity and weight loss, plan to recheck in six months.   - Glycosylated Hemoglobin A1c  - [facility] RED; Future  - [facility] RED    5. Obesity    Denise Harris CNP    Subjective   Chief Complaint:  No chief complaint on file.    HPI:   Dana Sykes is a 62 y.o. female who presents for establish care.    Previously seen at [facility]. PMH of HTN, NAVNEET, anxiety, depression, atrial fibrillation with RVR.      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  Discovers during the visit that titres and TB testing is to be completed at her occupational health visit so does not need these today.      A. Fib:  Diagnosed in 2017, admission to [facility].  Started on diltiazem, metoprolol, rivaroxaban and has continued since that time.  Never did follow up with cardiology.  CHADSVASC2 = 2 for gender and HTN. She states 1-2x/month she does experience symptoms of rapid heart rate and feeling 'off' though is unsure if this should be related to a panic attack or potentially atrial fibrillation.  When symptoms first  "began several days prior to her admission she felt they were consistent with anxiety so did not initially seek care.  No SOB. No CP.     Anxiety/depression:  On bupropion XL 300mg daily for over ten years.   In the past has tried fluoxetine and paroxetine.  She believes this works well for depression overall.  Did experience a deep depression after the death of her ex- last fall though feels she has recovered completely.  Mild anxiety at times as well.  H/o panic attacks. Again, difficult for her to tell what is a panic attack or potential arrhythmia.  Unable to identify specific triggers.      Postmenopause: s/p hysterectomy in her 30s.  Has been on Climara 0.05mg patch since that time.  Denies postmenopausal symptoms.  Has not previously discussed taper off.      Pedal edema:  Furosemide prn. H/o vein ablation. Not wearing compression stockings.     Allergies:  is allergic to codeine.    SH/FH:  Social History and Family History reviewed and updated.   Tobacco Status:  She  reports that she quit smoking about 36 years ago. She has never used smokeless tobacco.    Review of Systems:  A complete head to toe ROS is negative unless otherwise noted in HPI    Objective     Vitals:    05/01/18 1315   BP: 130/70   Patient Site: Right Arm   Patient Position: Sitting   Cuff Size: Adult Large   Pulse: 74   SpO2: 98%   Weight: (!) 259 lb (117.5 kg)   Height: 5' 6.5\" (1.689 m)       Physical Exam:  GENERAL: Alert, well-appearing female  CV: Regular rate and rhythm without murmurs, rubs or gallops.  RESP: Lung sounds clear  PV : LEs warm, pink trace edema bilaterally          "

## 2021-06-18 DIAGNOSIS — I48.20 CHRONIC ATRIAL FIBRILLATION (HCC): ICD-10-CM

## 2021-06-18 RX ORDER — METOPROLOL SUCCINATE 25 MG/1
25 TABLET, EXTENDED RELEASE ORAL DAILY
Qty: 90 TABLET | Refills: 3 | Status: SHIPPED | OUTPATIENT
Start: 2021-06-18 | End: 2022-06-16 | Stop reason: SDUPTHER

## 2021-06-18 RX ORDER — DILTIAZEM HYDROCHLORIDE 180 MG/1
180 CAPSULE, COATED, EXTENDED RELEASE ORAL
Qty: 90 CAPSULE | Refills: 3 | Status: SHIPPED | OUTPATIENT
Start: 2021-06-18 | End: 2022-06-16 | Stop reason: SDUPTHER

## 2021-06-18 RX ORDER — ATORVASTATIN CALCIUM 20 MG/1
20 TABLET, FILM COATED ORAL DAILY
Qty: 90 TABLET | Refills: 3 | Status: SHIPPED | OUTPATIENT
Start: 2021-06-18 | End: 2022-03-28

## 2021-06-19 NOTE — PROGRESS NOTES
Chief Complaint   Patient presents with     Cystitis     ongoing X 2months          HPI:   Dana Sykes is a 62 y.o. female has been seen three times  in the last 8 weeks for urinary symptoms. Most recently on 6/22/2018. Treated with bactrim. Symptoms cleared completely.  Has slight low back pain that started yesterday.  No fever.  No dysuria.  No hematuria since treatment.  Gets sharp pain that she localizes to the urethra. Less than once a week.  No vaginal discharge. On climara patch  Good fluid intake.    Normal urination.  Mild low abdominal pain--started today.  She is here today because she was told she should follow up since she had had so many UTI's recently.    ROS:  A 10 point comprehensive review of systems was negative except as noted.     Medications:  Current Outpatient Prescriptions on File Prior to Visit   Medication Sig Dispense Refill     atorvastatin (LIPITOR) 20 MG tablet Take 20 mg by mouth.       buPROPion (WELLBUTRIN XL) 300 MG 24 hr tablet Take 300 mg by mouth.       cholecalciferol, vitamin D3, 1,000 unit capsule Take 1,000 Units by mouth.       clotrimazole (LOTRIMIN) 1 % cream Apply topically.       diltiazem (CARDIZEM CD) 120 MG 24 hr capsule Take 120 mg by mouth.       estradiol (CLIMARA) 0.0375 mg/24 hr Place 1 patch on the skin every 7 days. 12 patch 1     metoprolol succinate (TOPROL-XL) 25 MG Take 25 mg by mouth.       rivaroxaban 20 mg Tab Take 1 tablet (20 mg total) by mouth daily. 30 tablet 12     furosemide (LASIX) 20 MG tablet Take 20 mg by mouth as needed.        No current facility-administered medications on file prior to visit.          Social History:  Social History   Substance Use Topics     Smoking status: Former Smoker     Quit date: 5/1/1982     Smokeless tobacco: Never Used     Alcohol use No         Physical Exam:   Vitals:    07/02/18 1544   BP: 128/70   Pulse: (!) 57   Resp: 12   Temp: 97.8  F (36.6  C)   TempSrc: Oral   SpO2: 94%   Weight: (!) 256 lb 8 oz  "(116.3 kg)   Height: 5' 6\" (1.676 m)       GEN:  NAD    LABS:  Results for orders placed or performed in visit on 07/02/18   Urinalysis-UC if Indicated   Result Value Ref Range    Color, UA Yellow Colorless, Yellow, Straw, Light Yellow    Clarity, UA Clear Clear    Glucose, UA Negative Negative    Bilirubin, UA Negative Negative    Ketones, UA Negative Negative    Specific Gravity, UA 1.015 1.005 - 1.030    Blood, UA Negative Negative    pH, UA 5.5 5.0 - 8.0    Protein, UA Negative Negative mg/dL    Urobilinogen, UA 0.2 E.U./dL 0.2 E.U./dL, 1.0 E.U./dL    Nitrite, UA Negative Negative    Leukocytes, UA Negative Negative        CHART REVIEW  DATE/place of visit: 5/9/18/ [facility]  DX: UTI  TESTS: urine culture 50-100K E. Coli sensitive to everything  TREATMENT: keflex  Symptoms cleared completely.    DATE/place of visit: 5/25/18; [facility]  DX: UTI  TESTS: urine culture 50-100K E. Coli sensitive to everything  TREATMENT: macrobid  Symptoms cleared completely      DATE/place of visit: 6/22/18: [facility]  DX: UTI  TESTS: urine culture-mixed urogenital travis  TREATMENT: bactrim  Symptoms cleared completely          Assessment/Plan:    1. Recurrent UTI  Urinalysis-UC if Indicated      Essentially asymptomatic at this time.  Urine dip is clear.  Likely had incompletely treated UTI.  Did discuss the role atrophic vaginitis can play in UTI's and symptoms.  No further treatment at this time.  Recheck if symptoms recur.          Edilberto Boykin MD      7/2/2018    The following portions of the patient's history were reviewed and updated as appropriate: allergies, current medications, past family history, past medical history, past social history, past surgical history and problem list.      "

## 2021-06-20 NOTE — PROGRESS NOTES
Chief Complaint   Patient presents with     bladder prolapse         HPI:   Dana Sykes is a 62 y.o. female wondering about bladder prolapse.  Noted some different tissue in the perineal area for the last week.  Has had irritation around urethral for the last 5-6 weeks.  No dysuria.  No blood in urine.  No fevers.  No abdominal.  Doesn't feel like she completely empties her bladder sometimes.    Had hysterectomy with oopherectomy around age 33 for endometriosis/cysts.  Has been on estrogen since.  Now on estrogen patch.  Also had surgery with mesh implant in the past.    Earlier this summer had recurrent urinary symptoms that she thought were UTI's.  These symptoms are better    ROS:  A 10 point comprehensive review of systems was negative except as noted.     Medications:  Current Outpatient Prescriptions on File Prior to Visit   Medication Sig Dispense Refill     atorvastatin (LIPITOR) 20 MG tablet Take 20 mg by mouth.       buPROPion (WELLBUTRIN XL) 300 MG 24 hr tablet Take 300 mg by mouth.       cholecalciferol, vitamin D3, 1,000 unit capsule Take 1,000 Units by mouth.       clotrimazole (LOTRIMIN) 1 % cream Apply topically.       diltiazem (CARDIZEM CD) 120 MG 24 hr capsule Take 120 mg by mouth.       estradiol (CLIMARA) 0.0375 mg/24 hr Place 1 patch on the skin every 7 days. 12 patch 1     furosemide (LASIX) 20 MG tablet Take 20 mg by mouth as needed.        metoprolol succinate (TOPROL-XL) 25 MG Take 25 mg by mouth.       rivaroxaban 20 mg Tab Take 1 tablet (20 mg total) by mouth daily. 30 tablet 12     No current facility-administered medications on file prior to visit.          Social History:  Social History   Substance Use Topics     Smoking status: Former Smoker     Quit date: 5/1/1982     Smokeless tobacco: Never Used     Alcohol use No         Physical Exam:   Vitals:    08/31/18 1014   BP: 120/74   Pulse: 64   Resp: 12   Temp: 98  F (36.7  C)   TempSrc: Oral   Weight: (!) 259 lb (117.5 kg)  "  Height: 5' 6\" (1.676 m)       GEN:  NAD  LUNGS:  Clear to auscultation without wheezing.  Normal effort.  HEART:  RRR without murmur, rub or gallop   ABDOMEN: Normal BS,  Soft and nontender,  No masses.  No CVA tenderness.  PELVIC:    External genitalia: atrophic  Vagina: Small amount white discharge. No cystocele or rectocele evident  Cervix: absent  Bimanual:  No masses or tenderness            Assessment/Plan:    1. Vaginal atrophy  conjugated estrogens (PREMARIN) vaginal cream      Although she is on estrogen patches, she has significant  Vaginal atrophy.  Trial of premarin cream.  Plan daily for two weeks then 2-3 times weekly and can decrease down to 1-2 times monthly as able.  Discussed no evident bladder or rectal prolapse.    If not improving with premarin, would consider GYN consult.            Edilberto Boykin MD      8/31/2018    The following portions of the patient's history were reviewed and updated as appropriate: allergies, current medications, past family history, past medical history, past social history, past surgical history and problem list.      "

## 2021-06-22 NOTE — TELEPHONE ENCOUNTER
Patient has been helped. Patient has some Premarin cream to last, pay may need to pay out-of-pocket.

## 2021-06-26 NOTE — PROGRESS NOTES
Progress Notes by Morgan Gr MD (Ted) at 5/11/2018  9:10 AM     Author: Morgan Gr MD (Ted) Service: -- Author Type: Physician    Filed: 5/11/2018 10:19 AM Encounter Date: 5/11/2018 Status: Signed    : Morgan Gr MD (Ted) (Physician)           Click to link to [facility] NOTE    Thank you, Dr. Harris, for asking the [facility] to evaluate Ms. Dana Sykes.      Assessment/Recommendations   Assessment:    Paroxysmal atrial fibrillation, modestly symptomatic  Hypertension, controlled  Sleep apnea, untreated  Hypercholesterolemia on statin  Obesity    Plan:  We discussed therapeutic options for prevention of cardioembolic events.  I went over warfarin, nonvitamin K dependent anticoagulants and watchman device.  She wants to continue with Xarelto.  Will increase dose to 20 mg a day which is appropriate for age and kidney function.    We also discussed antiarrhythmic therapy and ablation.  She is unsure if her symptoms are severe enough to  start any new medications or consider invasive procedure.    I stressed the importance of sleep apnea and weight control.    We will perform stress test to complete workup of atrial fibrillation.    Follow-up in 6 months       History of Present Illness    Ms. Dana Sykes is a 62 y.o. female who comes in for initial cardiac evaluation.  Year and a half ago she had an episode of atrial fibrillation.  She ended up being admitted to [facility].  She converted spontaneously to sinus rhythm.  She was placed on Xarelto, metoprolol and diltiazem.  She had normal echocardiogram.  She has not had follow-up with cardiologist since that time.  Over the course of last 12 months she had 2 maybe 3 episodes of heart palpitations.  Those episodes were short-lived.  They lasted several hours.  She did not seek medical attention.  Symptoms associated with atrial fibrillation  consist of lightheadedness and profound fatigue.  She has not had syncope or chest pains.  She is not known to have coronary artery disease, valvular heart disease, cardiomyopathy.    She has sleep apnea.  She is unable to tolerate CPAP mask.      Echocardiogram: 2016 at outside institution normal LVEF, no significant valvular abnormalities       Physical Examination Review of Systems   Vitals:    05/11/18 0911   BP: 132/80   Pulse: (!) 59   Resp: 12     Body mass index is 41.18 kg/(m^2).  Wt Readings from Last 3 Encounters:   05/11/18 (!) 259 lb (117.5 kg)   05/01/18 (!) 259 lb (117.5 kg)     General Appearance:   Alert, cooperative, no distress, appears stated age   Head/ENT: Normocephalic, without obvious abnormality. Membranes moist      EYES:  no scleral icterus, normal conjunctivae   Neck: Supple, symmetrical, trachea midline, no adenopathy, thyroid: not enlarged, symmetric, no carotid bruit or JVD   Chest/Lungs:   Lungs are clear to auscultation, respirations unlabored. No tenderness or deformity    Cardiovascular:   Regular rhythm, S1, S2 normal, no murmur, rub or gallop.   Abdomen:  Soft, non-tender, bowel sounds active all four quadrants,  no masses, no organomegaly   Extremities: no cyanosis or clubbing. No edema   Skin: Skin color, texture, turgor normal, no rashes or lesions.    Psychiatric: Normal affect, calm   Neurologic: Alert and oriented x 3, moving all four extremities.     General: WNL  Eyes: WNL  Ears/Nose/Throat: WNL  Lungs: WNL  Heart: WNL  Stomach: WNL  Bladder: WNL  Muscle/Joints: WNL  Skin: WNL  Nervous System: WNL  Mental Health: WNL     Blood: WNL     Medical History  Surgical History Family History Social History   Hypertension  vein stripping from lower extremities no family history of premature coronary artery disease Social History     Social History   ? Marital status:      Spouse name: N/A   ? Number of children: N/A   ? Years of education: N/A     Occupational History   ?   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 Smoking status: Former Smoker     Quit date: 5/1/1982   ? Smokeless tobacco: Never Used   ? Alcohol use No   ? Drug use: No   ? Sexual activity: Not on file     Other Topics Concern   ? Not on file     Social History Narrative          Medications  Allergies   Current Outpatient Prescriptions   Medication Sig Dispense Refill   ? cephalexin (KEFLEX) 250 MG capsule Take 250 mg by mouth 4 (four) times a day.     ? atorvastatin (LIPITOR) 20 MG tablet Take 20 mg by mouth.     ? buPROPion (WELLBUTRIN XL) 300 MG 24 hr tablet Take 300 mg by mouth.     ? cholecalciferol, vitamin D3, 1,000 unit capsule Take 1,000 Units by mouth.     ? clotrimazole (LOTRIMIN) 1 % cream Apply topically.     ? diltiazem (CARDIZEM CD) 120 MG 24 hr capsule Take 120 mg by mouth.     ? estradiol (CLIMARA) 0.0375 mg/24 hr Place 1 patch on the skin every 7 days. 12 patch 1   ? furosemide (LASIX) 20 MG tablet Take 20 mg by mouth.     ? metoprolol succinate (TOPROL-XL) 25 MG Take 25 mg by mouth.     ? rivaroxaban 20 mg Tab Take 1 tablet (20 mg total) by mouth daily. 30 tablet 12     No current facility-administered medications for this visit.       Allergies   Allergen Reactions   ? Codeine Itching         Lab Results    Chemistry/lipid CBC Cardiac Enzymes/BNP/TSH/INR   No results found for: CHOL, HDL, LDLCALC, TRIG, CREATININE, BUN, K, NA, CL, CO2 No results found for: WBC, HGB, HCT, MCV, PLT No results found for: CKTOTAL, CKMB, CKMBINDEX, TROPONINI, BNP, TSH, INR

## 2021-07-12 ENCOUNTER — OFFICE VISIT (OUTPATIENT)
Dept: INTERNAL MEDICINE | Facility: CLINIC | Age: 66
End: 2021-07-12

## 2021-07-12 VITALS
OXYGEN SATURATION: 98 % | HEART RATE: 74 BPM | WEIGHT: 268 LBS | RESPIRATION RATE: 16 BRPM | HEIGHT: 67 IN | TEMPERATURE: 97.5 F | DIASTOLIC BLOOD PRESSURE: 68 MMHG | SYSTOLIC BLOOD PRESSURE: 142 MMHG | BODY MASS INDEX: 42.06 KG/M2

## 2021-07-12 DIAGNOSIS — Z00.00 ROUTINE GENERAL MEDICAL EXAMINATION AT A HEALTH CARE FACILITY: Primary | ICD-10-CM

## 2021-07-12 DIAGNOSIS — F33.0 MILD EPISODE OF RECURRENT MAJOR DEPRESSIVE DISORDER (HCC): ICD-10-CM

## 2021-07-12 DIAGNOSIS — E78.2 MIXED HYPERLIPIDEMIA: ICD-10-CM

## 2021-07-12 DIAGNOSIS — E11.9 CONTROLLED TYPE 2 DIABETES MELLITUS WITHOUT COMPLICATION, WITHOUT LONG-TERM CURRENT USE OF INSULIN (HCC): ICD-10-CM

## 2021-07-12 DIAGNOSIS — Z13.820 SCREENING FOR OSTEOPOROSIS: ICD-10-CM

## 2021-07-12 DIAGNOSIS — E66.01 MORBID OBESITY WITH BMI OF 40.0-44.9, ADULT (HCC): ICD-10-CM

## 2021-07-12 DIAGNOSIS — M81.6 LOCALIZED OSTEOPOROSIS (LEQUESNE): ICD-10-CM

## 2021-07-12 DIAGNOSIS — I10 BENIGN ESSENTIAL HYPERTENSION: ICD-10-CM

## 2021-07-12 PROCEDURE — G0439 PPPS, SUBSEQ VISIT: HCPCS | Performed by: INTERNAL MEDICINE

## 2021-07-12 PROCEDURE — 99397 PER PM REEVAL EST PAT 65+ YR: CPT | Performed by: INTERNAL MEDICINE

## 2021-07-12 PROCEDURE — 1170F FXNL STATUS ASSESSED: CPT | Performed by: INTERNAL MEDICINE

## 2021-07-12 PROCEDURE — 1126F AMNT PAIN NOTED NONE PRSNT: CPT | Performed by: INTERNAL MEDICINE

## 2021-07-12 PROCEDURE — 96160 PT-FOCUSED HLTH RISK ASSMT: CPT | Performed by: INTERNAL MEDICINE

## 2021-07-12 PROCEDURE — 1159F MED LIST DOCD IN RCRD: CPT | Performed by: INTERNAL MEDICINE

## 2021-07-12 RX ORDER — BUPROPION HYDROCHLORIDE 300 MG/1
300 TABLET ORAL DAILY
Qty: 90 TABLET | Refills: 2 | Status: SHIPPED | OUTPATIENT
Start: 2021-07-12 | End: 2022-03-23

## 2021-07-12 NOTE — PATIENT INSTRUCTIONS
Medicare Wellness  Personal Prevention Plan of Service     Date of Office Visit:  2021  Encounter Provider:  Ines Hancock MD  Place of Service:  [facility]ARE  Patient Name: Neetu Wick  :  1955    As part of the Medicare Wellness portion of your visit today, we are providing you with this personalized preventive plan of services (PPPS). This plan is based upon recommendations of the [place] Preventive Services Task Force (USPSTF) and the Advisory Committee on Immunization Practices (ACIP).    This lists the preventive care services that should be considered, and provides dates of when you are due. Items listed as completed are up-to-date and do not require any further intervention.    Health Maintenance   Topic Date Due   • URINE MICROALBUMIN  Never done   • DXA SCAN  Never done   • HEPATITIS C SCREENING  Never done   • ANNUAL WELLNESS VISIT  Never done   • DIABETIC FOOT EXAM  Never done   • DIABETIC EYE EXAM  Never done   • INFLUENZA VACCINE  2021   • HEMOGLOBIN A1C  2021   • LIPID PANEL  2022   • MAMMOGRAM  2023   • COLORECTAL CANCER SCREENING  2025   • TDAP/TD VACCINES (3 - Td or Tdap) 08/15/2026   • COVID-19 Vaccine  Completed   • Pneumococcal Vaccine 65+  Completed   • ZOSTER VACCINE  Completed       No orders of the defined types were placed in this encounter.      No follow-ups on file.      MyPlate from USDA    MyPlate is an outline of a general healthy diet based on the 2010 Dietary Guidelines for Americans, from the U.S. Department of Agriculture (USDA). It sets guidelines for how much food you should eat from each food group based on your age, sex, and level of physical activity.  What are tips for following MyPlate?  To follow MyPlate recommendations:  · Eat a wide variety of fruits and vegetables, grains, and protein foods.  · Serve smaller portions and eat less food throughout the day.  · Limit portion sizes to avoid  overeating.  · Enjoy your food.  · Get at least 150 minutes of exercise every week. This is about 30 minutes each day, 5 or more days per week.  It can be difficult to have every meal look like MyPlate. Think about MyPlate as eating guidelines for an entire day, rather than each individual meal.  Fruits and vegetables  · Make half of your plate fruits and vegetables.  · Eat many different colors of fruits and vegetables each day.  · For a 2,000 calorie daily food plan, eat:  ? 2½ cups of vegetables every day.  ? 2 cups of fruit every day.  · 1 cup is equal to:  ? 1 cup raw or cooked vegetables.  ? 1 cup raw fruit.  ? 1 medium-sized orange, apple, or banana.  ? 1 cup 100% fruit or vegetable juice.  ? 2 cups raw leafy greens, such as lettuce, spinach, or kale.  ? ½ cup dried fruit.  Grains  · One fourth of your plate should be grains.  · Make at least half of the grains you eat each day whole grains.  · For a 2,000 calorie daily food plan, eat 6 oz of grains every day.  · 1 oz is equal to:  ? 1 slice bread.  ? 1 cup cereal.  ? ½ cup cooked rice, cereal, or pasta.  Protein  · One fourth of your plate should be protein.  · Eat a wide variety of protein foods, including meat, poultry, fish, eggs, beans, nuts, and tofu.  · For a 2,000 calorie daily food plan, eat 5½ oz of protein every day.  · 1 oz is equal to:  ? 1 oz meat, poultry, or fish.  ? ¼ cup cooked beans.  ? 1 egg.  ? ½ oz nuts or seeds.  ? 1 Tbsp peanut butter.  Dairy  · Drink fat-free or low-fat (1%) milk.  · Eat or drink dairy as a side to meals.  · For a 2,000 calorie daily food plan, eat or drink 3 cups of dairy every day.  · 1 cup is equal to:  ? 1 cup milk, yogurt, cottage cheese, or soy milk (soy beverage).  ? 2 oz processed cheese.  ? 1½ oz natural cheese.  Fats, oils, salt, and sugars  · Only small amounts of oils are recommended.  · Avoid foods that are high in calories and low in nutritional value (empty calories), like foods high in fat or added  sugars.  · Choose foods that are low in salt (sodium). Choose foods that have less than 140 milligrams (mg) of sodium per serving.  · Drink water instead of sugary drinks. Drink enough water each day to keep your urine pale yellow.  Where to find support  · Work with your health care provider or a nutrition specialist (dietitian) to develop a customized eating plan that is right for you.  · Download an zoila (mobile application) to help you track your daily food intake.  Where to find more information  · Go to ChooseMyPlate.gov for more information.  Summary  · MyPlate is a general guideline for healthy eating from the USDA. It is based on the 2010 Dietary Guidelines for Americans.  · In general, fruits and vegetables should take up ½ of your plate, grains should take up ¼ of your plate, and protein should take up ¼ of your plate.  This information is not intended to replace advice given to you by your health care provider. Make sure you discuss any questions you have with your health care provider.  Document Revised: 05/21/2020 Document Reviewed: 03/19/2018  [employer] Patient Education © 2021 [employer]      Exercising to Stay Healthy  To become healthy and stay healthy, it is recommended that you do moderate-intensity and vigorous-intensity exercise. You can tell that you are exercising at a moderate intensity if your heart starts beating faster and you start breathing faster but can still hold a conversation. You can tell that you are exercising at a vigorous intensity if you are breathing much harder and faster and cannot hold a conversation while exercising.  Exercising regularly is important. It has many health benefits, such as:  · Improving overall fitness, flexibility, and endurance.  · Increasing bone density.  · Helping with weight control.  · Decreasing body fat.  · Increasing muscle strength.  · Reducing stress and tension.  · Improving overall health.  How often should I exercise?  Choose an activity  that you enjoy, and set realistic goals. Your health care provider can help you make an activity plan that works for you.  Exercise regularly as told by your health care provider. This may include:  · Doing strength training two times a week, such as:  ? Lifting weights.  ? Using resistance bands.  ? Push-ups.  ? Sit-ups.  ? Yoga.  · Doing a certain intensity of exercise for a given amount of time. Choose from these options:  ? A total of 150 minutes of moderate-intensity exercise every week.  ? A total of 75 minutes of vigorous-intensity exercise every week.  ? A mix of moderate-intensity and vigorous-intensity exercise every week.  Children, pregnant women, people who have not exercised regularly, people who are overweight, and older adults may need to talk with a health care provider about what activities are safe to do. If you have a medical condition, be sure to talk with your health care provider before you start a new exercise program.  What are some exercise ideas?  Moderate-intensity exercise ideas include:  · Walking 1 mile (1.6 km) in about 15 minutes.  · Biking.  · Hiking.  · Golfing.  · Dancing.  · Water aerobics.  Vigorous-intensity exercise ideas include:  · Walking 4.5 miles (7.2 km) or more in about 1 hour.  · Jogging or running 5 miles (8 km) in about 1 hour.  · Biking 10 miles (16.1 km) or more in about 1 hour.  · Lap swimming.  · Roller-skating or in-line skating.  · Cross-country skiing.  · Vigorous competitive sports, such as football, basketball, and soccer.  · Jumping rope.  · Aerobic dancing.  What are some everyday activities that can help me to get exercise?  · Yard work, such as:  ? Pushing a .  ? Raking and bagging leaves.  · Washing your car.  · Pushing a stroller.  · Shoveling snow.  · Gardening.  · Washing windows or floors.  How can I be more active in my day-to-day activities?  · Use stairs instead of an elevator.  · Take a walk during your lunch break.  · If you drive,  park your car farther away from your work or school.  · If you take public transportation, get off one stop early and walk the rest of the way.  · Stand up or walk around during all of your indoor phone calls.  · Get up, stretch, and walk around every 30 minutes throughout the day.  · Enjoy exercise with a friend. Support to continue exercising will help you keep a regular routine of activity.  What guidelines can I follow while exercising?  · Before you start a new exercise program, talk with your health care provider.  · Do not exercise so much that you hurt yourself, feel dizzy, or get very short of breath.  · Wear comfortable clothes and wear shoes with good support.  · Drink plenty of water while you exercise to prevent dehydration or heat stroke.  · Work out until your breathing and your heartbeat get faster.  Where to find more information  · U.S. Department of Health and Human Services: www.hhs.gov  · Centers for Disease Control and Prevention (CDC): www.cdc.gov  Summary  · Exercising regularly is important. It will improve your overall fitness, flexibility, and endurance.  · Regular exercise also will improve your overall health. It can help you control your weight, reduce stress, and improve your bone density.  · Do not exercise so much that you hurt yourself, feel dizzy, or get very short of breath.  · Before you start a new exercise program, talk with your health care provider.  This information is not intended to replace advice given to you by your health care provider. Make sure you discuss any questions you have with your health care provider.  Document Revised: 2018 Document Reviewed: 2018  [facility] [facility] Patient Education 2021 [employer] [facility] [employer]    Medicare Wellness  Personal Prevention Plan of Service     Date of Office Visit:  2021  Encounter Provider:  Ines Hancock MD  Place of Service:  [facility]  Patient Name: Neetu Wick  :  1955    As part  of the Medicare Wellness portion of your visit today, we are providing you with this personalized preventive plan of services (PPPS). This plan is based upon recommendations of the [place] Preventive Services Task Force (USPSTF) and the Advisory Committee on Immunization Practices (ACIP).    This lists the preventive care services that should be considered, and provides dates of when you are due. Items listed as completed are up-to-date and do not require any further intervention.    Health Maintenance   Topic Date Due   • URINE MICROALBUMIN  Never done   • DXA SCAN  Never done   • HEPATITIS C SCREENING  Never done   • DIABETIC FOOT EXAM  Never done   • DIABETIC EYE EXAM  Never done   • INFLUENZA VACCINE  08/01/2021   • HEMOGLOBIN A1C  11/13/2021   • LIPID PANEL  05/13/2022   • ANNUAL WELLNESS VISIT  07/12/2022   • MAMMOGRAM  05/19/2023   • COLORECTAL CANCER SCREENING  08/03/2025   • TDAP/TD VACCINES (3 - Td or Tdap) 08/15/2026   • COVID-19 Vaccine  Completed   • Pneumococcal Vaccine 65+  Completed   • ZOSTER VACCINE  Completed       Orders Placed This Encounter   Procedures   • DEXA Bone Density Axial       Return in 1 year (on 7/12/2022) for Medicare Wellness.

## 2021-07-12 NOTE — PROGRESS NOTES
The ABCs of the Annual Wellness Visit  Subsequent Medicare Wellness Visit    Chief Complaint   Patient presents with   • Medicare Wellness-subsequent   • Hypertension   • Hyperlipidemia   • Diabetes       Subjective   History of Present Illness:  Neetu Wick is a 65 y.o. female who presents for a Subsequent Medicare Wellness Visit, physical and humana paf.   Patient is here to follow up on the blood pressure  The patient is taking the blood pressure medications as prescribed and has had no side effects. The patient is also here to follow up on the cholesterol and is trying to follow a diet. The patient is also here to follow on sugar and had  lab work done . The patient also needs refills on medications .  She is scheduled for a colonoscopy   Hypertension   Pertinent negatives include no chest pain, palpitations or shortness of breath.   Hyperlipidemia   Pertinent negatives include no chest pain or shortness of breath.   Diabetes   Pertinent negatives for hypoglycemia include no dizziness, nervousness/anxiousness or pallor. Pertinent negatives for diabetes include no chest pain, no shortness of breath  Patient is on acei/arb       HEALTH RISK ASSESSMENT    Recent Hospitalizations:  No hospitalization(s) within the last year.    Current Medical Providers:  Patient Care Team:  Ines Hancock MD as PCP - General (Internal Medicine)    Smoking Status:  Social History     Tobacco Use   Smoking Status Former Smoker   • Quit date: 1986   • Years since quittin.1   Smokeless Tobacco Never Used       Alcohol Consumption:  Social History     Substance and Sexual Activity   Alcohol Use Not Currently    Comment: recovering alcoholic       Depression Screen:   PHQ-2/PHQ-9 Depression Screening 2021   Little interest or pleasure in doing things 0   Feeling down, depressed, or hopeless 0   Total Score 0       Fall Risk Screen:  STEADI Fall Risk Assessment was completed, and patient is at LOW risk for  falls.Assessment completed on:7/12/2021    Health Habits and Functional and Cognitive Screening:  Functional & Cognitive Status 7/12/2021   Do you have difficulty preparing food and eating? No   Do you have difficulty bathing yourself, getting dressed or grooming yourself? No   Do you have difficulty using the toilet? No   Do you have difficulty moving around from place to place? No   Do you have trouble with steps or getting out of a bed or a chair? No   Current Diet Well Balanced Diet   Dental Exam Not up to date   Eye Exam Not up to date   Exercise (times per week) 6 times per week   Current Exercises Include Walking   Do you need help using the phone?  No   Are you deaf or do you have serious difficulty hearing?  No   Do you need help with transportation? No   Do you need help shopping? No   Do you need help preparing meals?  No   Do you need help with housework?  No   Do you need help with laundry? No   Do you need help taking your medications? No   Do you need help managing money? No   Do you ever drive or ride in a car without wearing a seat belt? No   Have you felt unusual stress, anger or loneliness in the last month? No   Who do you live with? Alone   If you need help, do you have trouble finding someone available to you? No   Have you been bothered in the last four weeks by sexual problems? No   Do you have difficulty concentrating, remembering or making decisions? No         Does the patient have evidence of cognitive impairment? No    Asprin use counseling:Does not need ASA (and currently is not on it)    Age-appropriate Screening Schedule:  Refer to the list below for future screening recommendations based on patient's age, sex and/or medical conditions. Orders for these recommended tests are listed in the plan section. The patient has been provided with a written plan.    Health Maintenance   Topic Date Due   • URINE MICROALBUMIN  Never done   • DXA SCAN  Never done   • DIABETIC FOOT EXAM  Never done    • DIABETIC EYE EXAM  Never done   • INFLUENZA VACCINE  08/01/2021   • HEMOGLOBIN A1C  11/13/2021   • LIPID PANEL  05/13/2022   • MAMMOGRAM  05/19/2023   • TDAP/TD VACCINES (3 - Td or Tdap) 08/15/2026   • ZOSTER VACCINE  Completed          The following portions of the patient's history were reviewed and updated as appropriate: allergies, current medications, past family history, past medical history, past social history, past surgical history and problem list.    Outpatient Medications Prior to Visit   Medication Sig Dispense Refill   • atorvastatin (LIPITOR) 20 MG tablet Take 1 tablet by mouth Daily. 90 tablet 3   • clotrimazole (LOTRIMIN) 1 % cream Apply  topically to the appropriate area as directed Daily.     • dilTIAZem CD (dilTIAZem CD) 180 MG 24 hr capsule Take 1 capsule by mouth every night at bedtime. 90 capsule 3   • estradiol (ESTRACE) 0.5 MG tablet Take 1 tablet by mouth Daily. 90 tablet 3   • Loratadine 10 MG capsule Take 10 mg by mouth Daily.     • metoprolol succinate XL (TOPROL-XL) 25 MG 24 hr tablet Take 1 tablet by mouth Daily. 90 tablet 3   • rivaroxaban (XARELTO) 20 MG tablet Take 1 tablet by mouth Every Night. 90 tablet 3   • buPROPion XL (WELLBUTRIN XL) 300 MG 24 hr tablet Take 300 mg by mouth Daily.       No facility-administered medications prior to visit.       Patient Active Problem List   Diagnosis   • Personal history of colonic polyps       Advanced Care Planning:  ACP discussion was held with the patient during this visit. Patient does not have an advance directive, declines further assistance.    Review of Systems   Constitutional: Negative for appetite change, fatigue and fever.   HENT: Negative for congestion, ear discharge, ear pain, sinus pressure and sore throat.    Eyes: Negative for pain and discharge.   Respiratory: Negative for cough, chest tightness, shortness of breath and wheezing.    Cardiovascular: Negative for chest pain, palpitations and leg swelling.  "  Gastrointestinal: Negative for abdominal pain, blood in stool, constipation, diarrhea and nausea.   Endocrine: Negative for cold intolerance and heat intolerance.   Genitourinary: Negative for dysuria, flank pain and frequency.   Musculoskeletal: Negative for back pain and joint swelling.   Skin: Negative for color change.   Allergic/Immunologic: Negative for environmental allergies and food allergies.   Neurological: Negative for dizziness, weakness, numbness and headaches.   Hematological: Negative for adenopathy. Does not bruise/bleed easily.   Psychiatric/Behavioral: Negative for behavioral problems and dysphoric mood. The patient is not nervous/anxious.        Compared to one year ago, the patient feels her physical health is the same.  Compared to one year ago, the patient feels her mental health is better.    Reviewed chart for potential of high risk medication in the elderly: not applicable  Reviewed chart for potential of harmful drug interactions in the elderly:not applicable    Objective         Vitals:    07/12/21 1513   BP: 142/68   Pulse: 74   Resp: 16   Temp: 97.5 °F (36.4 °C)   SpO2: 98%   Weight: 122 kg (268 lb)   Height: 170.2 cm (67.01\")   PainSc: 0-No pain       Body mass index is 41.96 kg/m².  Discussed the patient's BMI with her. The BMI is above average; no BMI management plan is appropriate..    Physical Exam  Vitals and nursing note reviewed.   Constitutional:       General: She is not in acute distress.     Appearance: Normal appearance. She is obese. She is not diaphoretic.   HENT:      Head: Normocephalic and atraumatic.      Right Ear: External ear normal.      Left Ear: External ear normal.      Nose: Nose normal.   Eyes:      Extraocular Movements: Extraocular movements intact.      Conjunctiva/sclera: Conjunctivae normal.   Neck:      Trachea: Trachea normal.   Cardiovascular:      Rate and Rhythm: Normal rate and regular rhythm.      Heart sounds: Normal heart sounds.   Pulmonary: "      Effort: Pulmonary effort is normal. No respiratory distress.      Breath sounds: Normal breath sounds.   Abdominal:      General: Abdomen is flat.   Musculoskeletal:         General: Deformity present.      Cervical back: Neck supple.      Comments: Moves all limbs   Skin:     General: Skin is warm.   Neurological:      Mental Status: She is alert and oriented to person, place, and time.      Comments: No gross motor or sensory deficits         Lab Results   Component Value Date    GLU 74 05/13/2021    CHLPL 135 05/13/2021    TRIG 73 05/13/2021    HDL 60 05/13/2021    LDL 60 05/13/2021    VLDL 15 05/13/2021    HGBA1C 6.00 (H) 05/13/2021        Assessment/Plan   Medicare Risks and Personalized Health Plan  CMS Preventative Services Quick Reference  Advance Directive Discussion  Breast Cancer/Mammogram Screening  Cardiovascular risk  Colon Cancer Screening  Depression/Dysphoria  Diabetic Lab Screening   Fall Risk  Hearing Problem    The above risks/problems have been discussed with the patient.  Pertinent information has been shared with the patient in the After Visit Summary.  Follow up plans and orders are seen below in the Assessment/Plan Section.    Diagnoses and all orders for this visit:    1. Routine general medical examination at a health care facility (Primary)    2. Benign essential hypertension    3. Mixed hyperlipidemia    4. Controlled type 2 diabetes mellitus without complication, without long-term current use of insulin (CMS/[facility])    5. Mild episode of recurrent major depressive disorder (CMS/[facility])  -     buPROPion XL (WELLBUTRIN XL) 300 MG 24 hr tablet; Take 1 tablet by mouth Daily.  Dispense: 90 tablet; Refill: 2    6. Screening for osteoporosis  -     DEXA Bone Density Axial    7. Localized osteoporosis ([place])   -     DEXA Bone Density Axial    8. Morbid obesity with BMI of 40.0-44.9, adult (CMS/[facility])    Plan:  1.physical: Physical exam conducted today, reviewed fasting lab work,   Impression:  healthy adult Patient. Currently, patient eats a healthy diet. Screening lab work includes glucose, lipid profile and complete blood count . The risks and benefits of immunizations were discussed and immunizations are up to date. Advice and education were given regarding nutrition and aerobic exercise. Patient discussion: discussed with the patient.  Annual Wellness Visit , physical and humana paf  with preventive exam as well as age and risk appropriate counseling completed.   Advance Directive Planning: paperwork and instructions were given to the patient.   Patient Discussion: plan discussed with the patient, follow-up visit needed in one year. Medication Review and medication list updated  2.  Benign essential hypertension: Will continue current medication, low-sodium diet advised, Counseled to regularly check BP at home with goal averaging <130/80.   3.mixed hyperlipidemia:  reviewed  fasting CMP and lipid panel.  Diet and exercise counseled,  Will continue current medications  4.  Diabetes mellitus:   reviewed  fasting CMP  and hba1c  , diet and exercise counseled  5.  Major depression: Refill Wellbutrin, labs reviewed  6. Osteoporosis: We will obtain DEXA scan  7.  Morbid obesity BMI 42 :diet and exercise counseled  Diet and exercise:  Totally eliminate trans- fats. Decrease saturated fats and concentrated sweets. Increase fresh fruits, vegetables and whole grains. Include low-fat dairy products, poultry, fish, legumes as well as non-tropical vegetable oils and nuts.  Eat more vegetables, fewer carbohydrates,  according to the American Diabetes Association , Reviewed guidelines for higher protein, limited carbohydrate diet to promote weight loss.  Encouraged patient to incorporate these principles of healthy eating  Go to choosemyplate.gov  Nutrition Goals   Dietary Guidelines per information packet as described above  Protein goal:  grams per day   Carbohydrate goal:  100-140 grams per day  Eliminate  soda, sweet tea, etc.     Exercise Goals  Continue current exercise routine   Add 15-30 minutes of activity per day as tolerated  150 minutes moderate intensity aerobic activity weekly (or walk 30 minutes every day).  Follow Up:  Return in 1 year (on 7/12/2022) for Medicare Wellness.     An After Visit Summary and PPPS were given to the patient.

## 2021-07-26 ENCOUNTER — APPOINTMENT (OUTPATIENT)
Dept: BONE DENSITY | Facility: HOSPITAL | Age: 66
End: 2021-07-26

## 2021-07-26 PROCEDURE — 77080 DXA BONE DENSITY AXIAL: CPT

## 2021-08-13 ENCOUNTER — OFFICE VISIT (OUTPATIENT)
Dept: INTERNAL MEDICINE | Facility: CLINIC | Age: 66
End: 2021-08-13

## 2021-08-13 VITALS
SYSTOLIC BLOOD PRESSURE: 146 MMHG | WEIGHT: 266 LBS | HEIGHT: 67 IN | TEMPERATURE: 97.3 F | HEART RATE: 66 BPM | BODY MASS INDEX: 41.75 KG/M2 | DIASTOLIC BLOOD PRESSURE: 67 MMHG | RESPIRATION RATE: 16 BRPM | OXYGEN SATURATION: 99 %

## 2021-08-13 DIAGNOSIS — R60.0 BILATERAL LOWER EXTREMITY EDEMA: Primary | ICD-10-CM

## 2021-08-13 DIAGNOSIS — I10 BENIGN ESSENTIAL HYPERTENSION: ICD-10-CM

## 2021-08-13 DIAGNOSIS — I87.2 VENOUS INSUFFICIENCY: ICD-10-CM

## 2021-08-13 PROCEDURE — 99213 OFFICE O/P EST LOW 20 MIN: CPT | Performed by: NURSE PRACTITIONER

## 2021-08-13 NOTE — PROGRESS NOTES
Office Visit      Patient Name: Neetu Wick  : 1955   MRN: [id number]   Care Team: Patient Care Team:  Ines Hancock MD as PCP - General (Internal Medicine)    Chief Complaint  Leg Swelling (bilateral, pt states it has been this way for a couple of days)    Subjective     Subjective      Neetu Wick presents to [facility] for edema. Symptoms started 2 days ago. Endorses bilateral leg edema that worsens throughout the day. Does admit to diet high in salt and minimal amount of water intake each day.   Denies erythema, warmth, shortness of breath, chest pain, palpitations, recent travel, tobacco use, coolness of extremities.   No history of blood clotting disorder. Has been on xarelto for atrial fibrillation for 5 years, taking as prescribed. Recently diltiazem has been increased by her PCP for poor blood pressure control.   Also has venous insuffifciency that is worse when it is hot outside. Supposed to wear compression stockings but usually doesn't in the summer.     Review of Systems   Constitutional: Negative for appetite change, fatigue and fever.   HENT: Negative for congestion, sore throat and trouble swallowing.    Eyes: Negative for blurred vision and visual disturbance.   Respiratory: Negative for cough, shortness of breath and wheezing.    Cardiovascular: Positive for leg swelling (bilateral). Negative for chest pain and palpitations.   Gastrointestinal: Negative for abdominal pain, blood in stool, constipation, diarrhea and nausea.   Endocrine: Negative for polydipsia, polyphagia and polyuria.   Genitourinary: Negative for dysuria.   Musculoskeletal: Negative for arthralgias, back pain and myalgias.   Skin: Negative for rash.   Neurological: Negative for dizziness, weakness, light-headedness and headache.   Psychiatric/Behavioral: Negative for sleep disturbance and depressed mood. The patient is not nervous/anxious.      Objective     Objective   Vital Signs:  "  /67 (BP Location: Left arm, Patient Position: Sitting, Cuff Size: Adult)   Pulse 66   Temp 97.3 °F (36.3 °C) (Temporal)   Resp 16   Ht 170.2 cm (67\")   Wt 121 kg (266 lb)   SpO2 99%   BMI 41.66 kg/m²     Physical Exam  Vitals and nursing note reviewed.   Constitutional:       General: She is not in acute distress.     Appearance: Normal appearance. She is obese. She is not toxic-appearing.   Eyes:      Pupils: Pupils are equal, round, and reactive to light.   Neck:      Vascular: No carotid bruit.   Cardiovascular:      Rate and Rhythm: Normal rate and regular rhythm.      Heart sounds: Normal heart sounds. No murmur heard.     Pulmonary:      Effort: Pulmonary effort is normal. No respiratory distress.      Breath sounds: Normal breath sounds. No wheezing.   Abdominal:      General: Bowel sounds are normal. There is no distension.      Palpations: Abdomen is soft.      Tenderness: There is no abdominal tenderness.   Musculoskeletal:         General: Normal range of motion.      Cervical back: Neck supple. No tenderness.      Right lower leg: 3+ Pitting Edema present.      Left lower leg: 3+ Pitting Edema present.   Skin:     General: Skin is warm and dry.      Findings: No rash.   Neurological:      General: No focal deficit present.      Mental Status: She is alert.   Psychiatric:         Mood and Affect: Mood normal.         Behavior: Behavior normal.        Assessment / Plan      Assessment/Plan   Problem List Items Addressed This Visit     None      Visit Diagnoses     Bilateral lower extremity edema  Venous insufficiency   -  Primary    Symptoms consistent with venous insufficiency. Recommend DASH diet, elevation, compression stockings, and exercise as tolerated. Discussed red flag and worsening symptoms and will return should she develop any one sided swelling with erythema or warmth.    Benign essential hypertension        Uncontrolled. Urged to decrease salt intake and exercise as tolerated " to help bring blood pressure down. We discussed adding on another antihypertensive and she does not want to do anything else to her medications right now. Keep follow-up with primary care provider and have labs performed prior to visit. Monitor bp at home and bring log to follow-up.                Follow Up   Return if symptoms worsen or fail to improve.  Patient was given instructions and counseling regarding her condition or for health maintenance advice. Please see specific information pulled into the AVS if appropriate.     LOBO Orr  [facility] [place]

## 2021-09-03 ENCOUNTER — OFFICE VISIT (OUTPATIENT)
Dept: INTERNAL MEDICINE | Facility: CLINIC | Age: 66
End: 2021-09-03

## 2021-09-03 VITALS
HEART RATE: 70 BPM | HEIGHT: 67 IN | DIASTOLIC BLOOD PRESSURE: 78 MMHG | OXYGEN SATURATION: 98 % | SYSTOLIC BLOOD PRESSURE: 138 MMHG | BODY MASS INDEX: 41.75 KG/M2 | WEIGHT: 266 LBS | TEMPERATURE: 96.9 F

## 2021-09-03 DIAGNOSIS — E11.9 CONTROLLED TYPE 2 DIABETES MELLITUS WITHOUT COMPLICATION, WITHOUT LONG-TERM CURRENT USE OF INSULIN (HCC): ICD-10-CM

## 2021-09-03 DIAGNOSIS — J01.40 ACUTE NON-RECURRENT PANSINUSITIS: Primary | ICD-10-CM

## 2021-09-03 LAB
POC CREATININE URINE: 300
POC MICROALBUMIN URINE: 30

## 2021-09-03 PROCEDURE — 99213 OFFICE O/P EST LOW 20 MIN: CPT | Performed by: INTERNAL MEDICINE

## 2021-09-03 PROCEDURE — 82044 UR ALBUMIN SEMIQUANTITATIVE: CPT | Performed by: INTERNAL MEDICINE

## 2021-09-03 RX ORDER — AMOXICILLIN AND CLAVULANATE POTASSIUM 500; 125 MG/1; MG/1
1 TABLET, FILM COATED ORAL 3 TIMES DAILY
Qty: 21 TABLET | Refills: 0 | Status: SHIPPED | OUTPATIENT
Start: 2021-09-03 | End: 2021-09-21

## 2021-09-03 NOTE — PROGRESS NOTES
"Subjective  Neetu Wick is a 65 y.o. female    HPI coming in with a 2-day history of nasal congestion facial pressure right earache denies fever or chills.  Has been immunized with Covid vaccine history of hyperglycemia.  She has been taking loratadine.  History of paroxysmal A. fib for which she is on Xarelto    The following portions of the patient's history were reviewed and updated as appropriate: allergies, current medications, past family history, past medical history, past social history, past surgical history, and problem list.     Review of Systems   Constitutional: Negative.  Negative for activity change, appetite change, fatigue and fever.   HENT: Positive for congestion and facial swelling. Negative for ear discharge, ear pain and trouble swallowing.    Eyes: Negative for photophobia and visual disturbance.   Respiratory: Negative for cough and shortness of breath.    Cardiovascular: Negative for chest pain and palpitations.   Gastrointestinal: Negative for abdominal distention, abdominal pain, constipation, diarrhea, nausea and vomiting.   Endocrine: Negative.    Genitourinary: Negative for dysuria, hematuria and urgency.   Musculoskeletal: Positive for arthralgias. Negative for back pain, joint swelling and myalgias.   Skin: Negative for color change and rash.   Allergic/Immunologic: Negative.    Neurological: Negative for dizziness, weakness, light-headedness and headaches.   Hematological: Negative for adenopathy. Does not bruise/bleed easily.   Psychiatric/Behavioral: Negative for agitation, confusion and dysphoric mood. The patient is not nervous/anxious.        Visit Vitals  /78   Pulse 70   Temp 96.9 °F (36.1 °C) (Infrared)   Ht 170.2 cm (67\")   Wt 121 kg (266 lb)   SpO2 98%   BMI 41.66 kg/m²       Objective  Physical Exam  Constitutional:       General: She is not in acute distress.     Appearance: She is well-developed.   HENT:      Nose: Nose normal.   Eyes:      General: No scleral " icterus.     Conjunctiva/sclera: Conjunctivae normal.   Neck:      Thyroid: No thyromegaly.      Trachea: No tracheal deviation.   Cardiovascular:      Rate and Rhythm: Normal rate and regular rhythm.      Heart sounds: No murmur heard.   No friction rub.   Pulmonary:      Effort: No respiratory distress.      Breath sounds: No wheezing or rales.   Abdominal:      General: There is no distension.      Palpations: Abdomen is soft. There is no mass.      Tenderness: There is no abdominal tenderness. There is no guarding.   Musculoskeletal:         General: No deformity. Normal range of motion.   Lymphadenopathy:      Cervical: No cervical adenopathy.   Skin:     General: Skin is warm and dry.      Findings: No erythema or rash.   Neurological:      Mental Status: She is alert and oriented to person, place, and time.      Cranial Nerves: No cranial nerve deficit.      Coordination: Coordination normal.      Deep Tendon Reflexes: Reflexes are normal and symmetric.   Psychiatric:         Behavior: Behavior normal.         Thought Content: Thought content normal.         Judgment: Judgment normal.         Diagnoses and all orders for this visit:    Acute non-recurrent pansinusitis added on steroid no spray.  Augmentin prescribed.  Discussed auto insufflation.  Advised warm water with salt gargles Tylenol as needed  History of diabetes is due for urine microalbumin study.  This has been ordered

## 2021-09-04 LAB
ALBUMIN SERPL-MCNC: 4.1 G/DL (ref 3.5–5.2)
ALBUMIN/GLOB SERPL: 2.1 G/DL
ALP SERPL-CCNC: 108 U/L (ref 39–117)
ALT SERPL-CCNC: 14 U/L (ref 1–33)
AST SERPL-CCNC: 17 U/L (ref 1–32)
BASOPHILS # BLD AUTO: 0.08 10*3/MM3 (ref 0–0.2)
BASOPHILS NFR BLD AUTO: 1.3 % (ref 0–1.5)
BILIRUB SERPL-MCNC: 0.8 MG/DL (ref 0–1.2)
BUN SERPL-MCNC: 17 MG/DL (ref 8–23)
BUN/CREAT SERPL: 22.4 (ref 7–25)
CALCIUM SERPL-MCNC: 9 MG/DL (ref 8.6–10.5)
CHLORIDE SERPL-SCNC: 106 MMOL/L (ref 98–107)
CHOLEST SERPL-MCNC: 140 MG/DL (ref 0–200)
CO2 SERPL-SCNC: 23.9 MMOL/L (ref 22–29)
CREAT SERPL-MCNC: 0.76 MG/DL (ref 0.57–1)
EOSINOPHIL # BLD AUTO: 0.37 10*3/MM3 (ref 0–0.4)
EOSINOPHIL NFR BLD AUTO: 6 % (ref 0.3–6.2)
ERYTHROCYTE [DISTWIDTH] IN BLOOD BY AUTOMATED COUNT: 12.3 % (ref 12.3–15.4)
GLOBULIN SER CALC-MCNC: 2 GM/DL
GLUCOSE SERPL-MCNC: 105 MG/DL (ref 65–99)
HBA1C MFR BLD: 6 % (ref 4.8–5.6)
HCT VFR BLD AUTO: 38.2 % (ref 34–46.6)
HDLC SERPL-MCNC: 57 MG/DL (ref 40–60)
HGB BLD-MCNC: 12.5 G/DL (ref 12–15.9)
IMM GRANULOCYTES # BLD AUTO: 0.01 10*3/MM3 (ref 0–0.05)
IMM GRANULOCYTES NFR BLD AUTO: 0.2 % (ref 0–0.5)
LDLC SERPL CALC-MCNC: 69 MG/DL (ref 0–100)
LYMPHOCYTES # BLD AUTO: 1.23 10*3/MM3 (ref 0.7–3.1)
LYMPHOCYTES NFR BLD AUTO: 19.9 % (ref 19.6–45.3)
MCH RBC QN AUTO: 30.9 PG (ref 26.6–33)
MCHC RBC AUTO-ENTMCNC: 32.7 G/DL (ref 31.5–35.7)
MCV RBC AUTO: 94.6 FL (ref 79–97)
MONOCYTES # BLD AUTO: 0.6 10*3/MM3 (ref 0.1–0.9)
MONOCYTES NFR BLD AUTO: 9.7 % (ref 5–12)
NEUTROPHILS # BLD AUTO: 3.89 10*3/MM3 (ref 1.7–7)
NEUTROPHILS NFR BLD AUTO: 62.9 % (ref 42.7–76)
NRBC BLD AUTO-RTO: 0 /100 WBC (ref 0–0.2)
PLATELET # BLD AUTO: 272 10*3/MM3 (ref 140–450)
POTASSIUM SERPL-SCNC: 4.1 MMOL/L (ref 3.5–5.2)
PROT SERPL-MCNC: 6.1 G/DL (ref 6–8.5)
RBC # BLD AUTO: 4.04 10*6/MM3 (ref 3.77–5.28)
SODIUM SERPL-SCNC: 139 MMOL/L (ref 136–145)
TRIGL SERPL-MCNC: 72 MG/DL (ref 0–150)
VLDLC SERPL CALC-MCNC: 14 MG/DL (ref 5–40)
WBC # BLD AUTO: 6.18 10*3/MM3 (ref 3.4–10.8)

## 2021-09-13 ENCOUNTER — OFFICE VISIT (OUTPATIENT)
Dept: INTERNAL MEDICINE | Facility: CLINIC | Age: 66
End: 2021-09-13

## 2021-09-13 VITALS
BODY MASS INDEX: 41.75 KG/M2 | HEART RATE: 65 BPM | DIASTOLIC BLOOD PRESSURE: 85 MMHG | OXYGEN SATURATION: 96 % | RESPIRATION RATE: 14 BRPM | HEIGHT: 67 IN | SYSTOLIC BLOOD PRESSURE: 143 MMHG | TEMPERATURE: 96.8 F | WEIGHT: 266 LBS

## 2021-09-13 DIAGNOSIS — Z20.822 CLOSE EXPOSURE TO COVID-19 VIRUS: ICD-10-CM

## 2021-09-13 DIAGNOSIS — E78.2 MIXED HYPERLIPIDEMIA: ICD-10-CM

## 2021-09-13 DIAGNOSIS — E11.9 CONTROLLED TYPE 2 DIABETES MELLITUS WITHOUT COMPLICATION, WITHOUT LONG-TERM CURRENT USE OF INSULIN (HCC): ICD-10-CM

## 2021-09-13 DIAGNOSIS — R60.0 LOCALIZED EDEMA: ICD-10-CM

## 2021-09-13 DIAGNOSIS — I10 BENIGN ESSENTIAL HYPERTENSION: Primary | ICD-10-CM

## 2021-09-13 PROCEDURE — U0004 COV-19 TEST NON-CDC HGH THRU: HCPCS | Performed by: INTERNAL MEDICINE

## 2021-09-13 PROCEDURE — 99214 OFFICE O/P EST MOD 30 MIN: CPT | Performed by: INTERNAL MEDICINE

## 2021-09-13 NOTE — PROGRESS NOTES
"Chief Complaint  Follow-up, Hypertension, Hyperlipidemia, Diabetes, and Leg Swelling    Subjective          Neetu Wick presents to [facility]  History of Present Illness  HPI: Patient is here to follow up on the blood pressure  The patient is taking the blood pressure medications as prescribed and has had no side effects. The patient is also here to follow up on the cholesterol and is trying to follow a diet. The patient is  also here to follow up on sugar and had lab work done .  The patient also needs refills on medications .  Patient complains that she was at [facility] on September 2 , 2021 and was informed on September 6 that she could have been exposed to Covid patient and she had a rapid test which was negative and she flew to [place] and back during the time, she was seen in the clinic on September 3 for acute sinusitis and has completed Augmentin, she denies any fever, she does complain of right leg swelling  Hyperlipidemia   Pertinent negatives include no chest pain or shortness of breath.   Hypertension   Pertinent negatives include no chest pain, palpitations or shortness of breath.    Objective   Vital Signs:   /85   Pulse 65   Temp 96.8 °F (36 °C)   Resp 14   Ht 170.2 cm (67.01\")   Wt 121 kg (266 lb)   SpO2 96%   BMI 41.65 kg/m²     Physical Exam  Vitals and nursing note reviewed.   Constitutional:       General: She is not in acute distress.     Appearance: Normal appearance. She is not diaphoretic.   HENT:      Head: Normocephalic and atraumatic.      Right Ear: External ear normal.      Left Ear: External ear normal.      Nose: Nose normal.   Eyes:      Extraocular Movements: Extraocular movements intact.      Conjunctiva/sclera: Conjunctivae normal.   Neck:      Trachea: Trachea normal.   Cardiovascular:      Rate and Rhythm: Normal rate and regular rhythm.      Heart sounds: Normal heart sounds.   Pulmonary:      Effort: Pulmonary effort " is normal. No respiratory distress.      Breath sounds: Normal breath sounds.   Abdominal:      General: Abdomen is flat.   Musculoskeletal:         General: Deformity present.      Cervical back: Neck supple.      Right lower leg: Edema present.      Left lower leg: Edema present.      Comments: Right more than left  leg swelling   Skin:     General: Skin is warm.   Neurological:      Mental Status: She is alert and oriented to person, place, and time.      Comments: No gross motor or sensory deficits        Result Review :     Common labs    Common Labsle 5/13/21 5/13/21 5/13/21 5/13/21 9/3/21 9/3/21 9/3/21 9/3/21    1010 1010 1010 1010 1101 1101 1101 1101   Glucose  74    105 (A)     BUN  13    17     Creatinine  0.79    0.76     eGFR Non African Am  73    76     eGFR  Am  88    92     Sodium  145    139     Potassium  4.7    4.1     Chloride  108 (A)    106     Calcium  8.9    9.0     Total Protein  6.4    6.1     Albumin  4.00    4.10     Total Bilirubin  0.6    0.8     Alkaline Phosphatase  103    108     AST (SGOT)  11    17     ALT (SGPT)  8    14     WBC 4.88    6.18      Hemoglobin 12.7    12.5      Hematocrit 38.5    38.2      Platelets 260    272      Total Cholesterol   135    140    Triglycerides   73    72    HDL Cholesterol   60    57    LDL Cholesterol    60    69    Hemoglobin A1C    6.00 (A)    6.00 (A)   (A) Abnormal value       Comments are available for some flowsheets but are not being displayed.                     Assessment and Plan    Diagnoses and all orders for this visit:    1. Benign essential hypertension (Primary)    2. Mixed hyperlipidemia  -     CBC & Differential  -     Comprehensive Metabolic Panel  -     Lipid Panel    3. Controlled type 2 diabetes mellitus without complication, without long-term current use of insulin (CMS/[facility])  -     Hemoglobin A1c  -     Microalbumin / Creatinine Urine Ratio - Urine, Clean Catch    4. Close exposure to COVID-19 virus  -     COVID-19  PCR, [facility], [facility] SWAB [facility] [employer]/ORAL SWISH 24-30 HR TAT - Swab, Nasopharynx    5. Localized edema  -     US venous doppler lower extremity right (duplex)    Plan:  1.  Benign essential hypertension: Will continue current medication, low-sodium diet advised, Counseled to regularly check BP at home with goal averaging <130/80.   2.mixed hyperlipidemia: Reviewed   fasting CMP and lipid panel.  Diet and exercise counseled,  Will continue current medications  3. Diabetes  Mellitus  : Reviewed   fasting CMP  and hba1c 6.0  , diet and exercise counseled , Will continue current medications  4.  Edema: We will obtain venous Doppler of right leg  5.  Close exposure to Covid: We will obtain Covid test  I spent 30 minutes caring for Neetu on this date of service. This time includes time spent by me in the following activities:preparing for the visit, reviewing tests, performing a medically appropriate examination and/or evaluation , counseling and educating the patient/family/caregiver, ordering medications, tests, or procedures and documenting information in the medical record  Follow Up   Patient was given instructions and counseling regarding her condition or for health maintenance advice. Please see specific information pulled into the AVS if appropriate.

## 2021-09-14 LAB — SARS-COV-2 RNA NOSE QL NAA+PROBE: NOT DETECTED

## 2021-09-17 ENCOUNTER — TELEPHONE (OUTPATIENT)
Dept: INTERNAL MEDICINE | Facility: CLINIC | Age: 66
End: 2021-09-17

## 2021-09-17 NOTE — TELEPHONE ENCOUNTER
Called to report she still hasn't heard anything on an appointment to her US, wants to know if she needs to reschedule appointment for 9/21?

## 2021-09-17 NOTE — TELEPHONE ENCOUNTER
Please check with referral , they should have it scheduled as she is covid negative , keep same  appt  Day 9-

## 2021-09-17 NOTE — TELEPHONE ENCOUNTER
Pt given number for central scheduling to schedule US advised to keep appointment, verbalized understanding

## 2021-09-20 ENCOUNTER — HOSPITAL ENCOUNTER (OUTPATIENT)
Dept: ULTRASOUND IMAGING | Facility: HOSPITAL | Age: 66
Discharge: HOME | End: 2021-09-20
Admitting: INTERNAL MEDICINE

## 2021-09-20 PROCEDURE — 93971 EXTREMITY STUDY: CPT

## 2021-09-21 ENCOUNTER — OFFICE VISIT (OUTPATIENT)
Dept: INTERNAL MEDICINE | Facility: CLINIC | Age: 66
End: 2021-09-21

## 2021-09-21 VITALS
TEMPERATURE: 98.1 F | DIASTOLIC BLOOD PRESSURE: 81 MMHG | BODY MASS INDEX: 41.91 KG/M2 | HEIGHT: 67 IN | HEART RATE: 61 BPM | OXYGEN SATURATION: 97 % | WEIGHT: 267 LBS | SYSTOLIC BLOOD PRESSURE: 142 MMHG | RESPIRATION RATE: 16 BRPM

## 2021-09-21 DIAGNOSIS — I48.0 PAROXYSMAL ATRIAL FIBRILLATION (HCC): ICD-10-CM

## 2021-09-21 DIAGNOSIS — I87.2 VENOUS INSUFFICIENCY: ICD-10-CM

## 2021-09-21 DIAGNOSIS — I10 BENIGN ESSENTIAL HYPERTENSION: Primary | ICD-10-CM

## 2021-09-21 DIAGNOSIS — R60.0 LOCALIZED EDEMA: ICD-10-CM

## 2021-09-21 PROCEDURE — 99214 OFFICE O/P EST MOD 30 MIN: CPT | Performed by: INTERNAL MEDICINE

## 2021-09-21 RX ORDER — POTASSIUM CHLORIDE 750 MG/1
10 TABLET, FILM COATED, EXTENDED RELEASE ORAL DAILY
Qty: 30 TABLET | Refills: 2 | Status: SHIPPED | OUTPATIENT
Start: 2021-09-21 | End: 2021-12-03 | Stop reason: HOSPADM

## 2021-09-21 RX ORDER — FUROSEMIDE 40 MG/1
40 TABLET ORAL DAILY
Qty: 30 TABLET | Refills: 2 | Status: SHIPPED | OUTPATIENT
Start: 2021-09-21 | End: 2021-12-03 | Stop reason: HOSPADM

## 2021-09-21 NOTE — PROGRESS NOTES
"Chief Complaint  Hypertension (US results), Leg Swelling, and Atrial Fibrillation    Subjective          Neteu Wick presents to [facility]  History of Present Illness  HPI: Patient is here to follow up on the blood pressure  The patient is taking the blood pressure medications as prescribed and has had no side effects. The patient is also here to follow up on atrial fibrillation and she is on anticoagulation with Xarelto, she recently went to [place] and since then has noted that her leg has been swelling, she had an ultrasound done which showed negative for DVT and she is on Xarelto, patient also complains of increased leg swelling at this time  Hypertension   Pertinent negatives include no chest pain, palpitations or shortness of breath.    Objective   Vital Signs:   /81   Pulse 61   Temp 98.1 °F (36.7 °C)   Resp 16   Ht 170.2 cm (67.01\")   Wt 121 kg (267 lb)   SpO2 97%   BMI 41.81 kg/m²     Physical Exam  Vitals and nursing note reviewed.   Constitutional:       General: She is not in acute distress.     Appearance: Normal appearance. She is not diaphoretic.   HENT:      Head: Normocephalic and atraumatic.      Right Ear: External ear normal.      Left Ear: External ear normal.      Nose: Nose normal.   Eyes:      Extraocular Movements: Extraocular movements intact.      Conjunctiva/sclera: Conjunctivae normal.   Neck:      Trachea: Trachea normal.   Cardiovascular:      Rate and Rhythm: Normal rate and regular rhythm.      Heart sounds: Normal heart sounds.   Pulmonary:      Effort: Pulmonary effort is normal. No respiratory distress.      Breath sounds: Normal breath sounds.   Abdominal:      General: Abdomen is flat.   Musculoskeletal:         General: Deformity present.      Cervical back: Neck supple.      Right lower leg: Edema present.      Left lower leg: Edema present.      Comments: Moves all limbs   Skin:     General: Skin is warm.   Neurological:      " Mental Status: She is alert and oriented to person, place, and time.      Comments: No gross motor or sensory deficits        Result Review :     Common labs    Common Labsle 5/13/21 5/13/21 5/13/21 5/13/21 9/3/21 9/3/21 9/3/21 9/3/21    1010 1010 1010 1010 1101 1101 1101 1101   Glucose  74    105 (A)     BUN  13    17     Creatinine  0.79    0.76     eGFR Non African Am  73    76     eGFR  Am  88    92     Sodium  145    139     Potassium  4.7    4.1     Chloride  108 (A)    106     Calcium  8.9    9.0     Total Protein  6.4    6.1     Albumin  4.00    4.10     Total Bilirubin  0.6    0.8     Alkaline Phosphatase  103    108     AST (SGOT)  11    17     ALT (SGPT)  8    14     WBC 4.88    6.18      Hemoglobin 12.7    12.5      Hematocrit 38.5    38.2      Platelets 260    272      Total Cholesterol   135    140    Triglycerides   73    72    HDL Cholesterol   60    57    LDL Cholesterol    60    69    Hemoglobin A1C    6.00 (A)    6.00 (A)   (A) Abnormal value       Comments are available for some flowsheets but are not being displayed.                     Assessment and Plan    Diagnoses and all orders for this visit:    1. Benign essential hypertension (Primary)    2. Localized edema    3. Venous insufficiency    4. Paroxysmal atrial fibrillation (CMS/HCC)    Other orders  -     furosemide (Lasix) 40 MG tablet; Take 1 tablet by mouth Daily.  Dispense: 30 tablet; Refill: 2  -     potassium chloride 10 MEQ CR tablet; Take 1 tablet by mouth Daily.  Dispense: 30 tablet; Refill: 2    Plan:  1.  Benign essential hypertension: Will continue current medication, low-sodium diet advised, Counseled to regularly check BP at home with goal averaging <130/80.   2. Edema: Ultrasound negative, start diuretics, monitor vitals, most secondary to:  3.  Venous insufficiency: Patient advised compression stockings  4.  Asthma atrial fibrillation: To continue current medication, on anticoagulation  I spent 30 minutes caring for  Neetu on this date of service. This time includes time spent by me in the following activities:preparing for the visit, reviewing tests, performing a medically appropriate examination and/or evaluation , counseling and educating the patient/family/caregiver, ordering medications, tests, or procedures and documenting information in the medical record  Follow Up   Return in about 15 days (around 10/6/2021).  Patient was given instructions and counseling regarding her condition or for health maintenance advice. Please see specific information pulled into the AVS if appropriate.

## 2021-10-04 ENCOUNTER — HEALTH MAINTENANCE LETTER (OUTPATIENT)
Age: 66
End: 2021-10-04

## 2021-10-06 ENCOUNTER — OFFICE VISIT (OUTPATIENT)
Dept: INTERNAL MEDICINE | Facility: CLINIC | Age: 66
End: 2021-10-06

## 2021-10-06 VITALS
RESPIRATION RATE: 16 BRPM | HEIGHT: 67 IN | OXYGEN SATURATION: 97 % | WEIGHT: 262 LBS | TEMPERATURE: 96.2 F | HEART RATE: 59 BPM | SYSTOLIC BLOOD PRESSURE: 157 MMHG | DIASTOLIC BLOOD PRESSURE: 79 MMHG | BODY MASS INDEX: 41.12 KG/M2

## 2021-10-06 DIAGNOSIS — R05.9 COUGH: ICD-10-CM

## 2021-10-06 DIAGNOSIS — H66.003 ACUTE SUPPURATIVE OTITIS MEDIA OF BOTH EARS WITHOUT SPONTANEOUS RUPTURE OF TYMPANIC MEMBRANES, RECURRENCE NOT SPECIFIED: ICD-10-CM

## 2021-10-06 DIAGNOSIS — I10 BENIGN ESSENTIAL HYPERTENSION: Primary | ICD-10-CM

## 2021-10-06 DIAGNOSIS — R60.0 LOCALIZED EDEMA: ICD-10-CM

## 2021-10-06 DIAGNOSIS — Z23 NEED FOR IMMUNIZATION AGAINST INFLUENZA: ICD-10-CM

## 2021-10-06 DIAGNOSIS — J01.01 ACUTE RECURRENT MAXILLARY SINUSITIS: ICD-10-CM

## 2021-10-06 PROCEDURE — G0008 ADMIN INFLUENZA VIRUS VAC: HCPCS | Performed by: INTERNAL MEDICINE

## 2021-10-06 PROCEDURE — 99214 OFFICE O/P EST MOD 30 MIN: CPT | Performed by: INTERNAL MEDICINE

## 2021-10-06 PROCEDURE — 90662 IIV NO PRSV INCREASED AG IM: CPT | Performed by: INTERNAL MEDICINE

## 2021-10-06 RX ORDER — CEPHALEXIN 500 MG/1
500 CAPSULE ORAL 3 TIMES DAILY
Qty: 30 CAPSULE | Refills: 0 | Status: SHIPPED | OUTPATIENT
Start: 2021-10-06 | End: 2021-10-16

## 2021-10-06 RX ORDER — BENZONATATE 200 MG/1
200 CAPSULE ORAL 3 TIMES DAILY PRN
Qty: 30 CAPSULE | Refills: 0 | Status: SHIPPED | OUTPATIENT
Start: 2021-10-06 | End: 2021-10-16

## 2021-12-02 NOTE — PRE-PROCEDURE INSTRUCTIONS
PAT phone history completed with pt for upcoming procedure on 12/3/21 with Dr. Plummer.     PAT PASS GIVEN/REVIEWED WITH PT.  VERBALIZED UNDERSTANDING OF THE FOLLOWING:  DO NOT EAT, DRINK, SMOKE, USE SMOKELESS TOBACCO OR CHEW GUM AFTER MIDNIGHT THE NIGHT BEFORE SURGERY.  THIS ALSO INCLUDES HARD CANDIES AND MINTS.    DO NOT SHAVE THE AREA TO BE OPERATED ON AT LEAST 48 HOURS PRIOR TO THE PROCEDURE.  DO NOT WEAR MAKE UP OR NAIL POLISH.  DO NOT LEAVE IN ANY PIERCING OR WEAR JEWELRY THE DAY OF SURGERY.      DO NOT USE ADHESIVES IF YOU WEAR DENTURES.    DO NOT WEAR EYE CONTACTS; BRING IN YOUR GLASSES.    ONLY TAKE MEDICATION THE MORNING OF YOUR PROCEDURE IF INSTRUCTED BY YOUR SURGEON WITH ENOUGH WATER TO SWALLOW THE MEDICATION.  IF YOUR SURGEON DID NOT SPECIFY WHICH MEDICATIONS TO TAKE, YOU WILL NEED TO CALL THEIR OFFICE FOR FURTHER INSTRUCTIONS AND DO AS THEY INSTRUCT.    LEAVE ANYTHING YOU CONSIDER VALUABLE AT HOME.    YOU WILL NEED TO ARRANGE FOR SOMEONE TO DRIVE YOU HOME AFTER SURGERY.  IT IS RECOMMENDED THAT YOU DO NOT DRIVE, WORK, DRINK ALCOHOL OR MAKE MAJOR DECISIONS FOR AT LEAST 24 HOURS AFTER YOUR PROCEDURE IS COMPLETE.      THE DAY OF YOUR PROCEDURE, BRING IN THE FOLLOWING IF APPLICABLE:   PICTURE ID AND INSURANCE/MEDICARE OR MEDICAID CARDS/ANY CO-PAY THAT MAY BE DUE   COPY OF ADVANCED DIRECTIVE/LIVING WILL/POWER OR    CPAP/BIPAP/INHALERS   SKIN PREP SHEET   YOUR PREADMISSION TESTING PASS (IF NOT A PHONE HISTORY)

## 2021-12-03 ENCOUNTER — ANESTHESIA EVENT (OUTPATIENT)
Dept: GASTROENTEROLOGY | Facility: HOSPITAL | Age: 66
End: 2021-12-03

## 2021-12-03 ENCOUNTER — HOSPITAL ENCOUNTER (OUTPATIENT)
Facility: HOSPITAL | Age: 66
Setting detail: HOSPITAL OUTPATIENT SURGERY
Discharge: HOME | End: 2021-12-03
Attending: INTERNAL MEDICINE | Admitting: INTERNAL MEDICINE

## 2021-12-03 ENCOUNTER — ANESTHESIA (OUTPATIENT)
Dept: GASTROENTEROLOGY | Facility: HOSPITAL | Age: 66
End: 2021-12-03

## 2021-12-03 VITALS
WEIGHT: 252 LBS | HEIGHT: 67 IN | OXYGEN SATURATION: 95 % | BODY MASS INDEX: 39.55 KG/M2 | HEART RATE: 64 BPM | SYSTOLIC BLOOD PRESSURE: 137 MMHG | TEMPERATURE: 97.5 F | RESPIRATION RATE: 16 BRPM | DIASTOLIC BLOOD PRESSURE: 59 MMHG

## 2021-12-03 DIAGNOSIS — Z86.010 PERSONAL HISTORY OF COLONIC POLYPS: ICD-10-CM

## 2021-12-03 PROCEDURE — 25010000002 FENTANYL CITRATE (PF) 100 MCG/2ML SOLUTION: Performed by: NURSE ANESTHETIST, CERTIFIED REGISTERED

## 2021-12-03 PROCEDURE — 88305 TISSUE EXAM BY PATHOLOGIST: CPT | Performed by: INTERNAL MEDICINE

## 2021-12-03 PROCEDURE — 25010000002 PROPOFOL 1000 MG/100ML EMULSION: Performed by: NURSE ANESTHETIST, CERTIFIED REGISTERED

## 2021-12-03 PROCEDURE — 45385 COLONOSCOPY W/LESION REMOVAL: CPT | Performed by: INTERNAL MEDICINE

## 2021-12-03 RX ORDER — PROPOFOL 10 MG/ML
INJECTION, EMULSION INTRAVENOUS AS NEEDED
Status: DISCONTINUED | OUTPATIENT
Start: 2021-12-03 | End: 2021-12-03 | Stop reason: SURG

## 2021-12-03 RX ORDER — SODIUM CHLORIDE 9 MG/ML
70 INJECTION, SOLUTION INTRAVENOUS CONTINUOUS PRN
Status: DISCONTINUED | OUTPATIENT
Start: 2021-12-03 | End: 2021-12-03 | Stop reason: HOSPADM

## 2021-12-03 RX ORDER — SIMETHICONE 20 MG/.3ML
EMULSION ORAL AS NEEDED
Status: DISCONTINUED | OUTPATIENT
Start: 2021-12-03 | End: 2021-12-03 | Stop reason: HOSPADM

## 2021-12-03 RX ORDER — FENTANYL CITRATE 50 UG/ML
INJECTION, SOLUTION INTRAMUSCULAR; INTRAVENOUS AS NEEDED
Status: DISCONTINUED | OUTPATIENT
Start: 2021-12-03 | End: 2021-12-03 | Stop reason: SURG

## 2021-12-03 RX ADMIN — SODIUM CHLORIDE 70 ML/HR: 9 INJECTION, SOLUTION INTRAVENOUS at 09:30

## 2021-12-03 RX ADMIN — PROPOFOL 100 MG: 10 INJECTION, EMULSION INTRAVENOUS at 10:33

## 2021-12-03 RX ADMIN — PROPOFOL 100 MG: 10 INJECTION, EMULSION INTRAVENOUS at 10:46

## 2021-12-03 RX ADMIN — FENTANYL CITRATE 100 MCG: 50 INJECTION, SOLUTION INTRAMUSCULAR; INTRAVENOUS at 10:33

## 2021-12-03 NOTE — ANESTHESIA POSTPROCEDURE EVALUATION
Patient: Neetu Wick    Procedure Summary     Date: 12/03/21 Room / Location: [facility] / [facility]    Anesthesia Start: 1032 Anesthesia Stop: 1110    Procedure: COLONOSCOPY with polypectomy x9 (N/A Anus) Diagnosis:       Personal history of colonic polyps      (Personal history of colonic polyps [Z86.010])    Surgeons: Adelso Plummer MD Provider: Gonzalo Cedillo CRNA    Anesthesia Type: MAC ASA Status: 3          Anesthesia Type: MAC    Vitals  Vitals Value Taken Time   /59 12/03/21 1126   Temp 97.5 °F (36.4 °C) 12/03/21 1106   Pulse 64 12/03/21 1126   Resp 16 12/03/21 1126   SpO2 95 % 12/03/21 1126           Post Anesthesia Care and Evaluation    Patient location during evaluation: bedside  Patient participation: complete - patient participated  Level of consciousness: awake  Pain score: 0  Pain management: adequate  Airway patency: patent  Anesthetic complications: No anesthetic complications  PONV Status: controlled  Cardiovascular status: acceptable and stable  Respiratory status: acceptable and room air  Hydration status: acceptable

## 2021-12-03 NOTE — H&P
[facility]  HISTORY AND PHYSICAL    Patient Name: Neetu Wick  : 1955  MRN: [id number]    Chief Complaint:   For surveillance colonoscopy    History Of Presenting Illness:    Personal history of colon polyps     Past Medical History:   Diagnosis Date   • Afib (HCC)    • Depression    • Dyslipidemia    • Ear piercing    • Fracture     right ankle   • Fracture, radius 2015    left   • H/O echocardiogram        • History of nuclear stress test        • Hypertension    • Incontinence    • Murmur    • Obesity    • Ovarian failure    • Panic attacks    • Pedal edema    • PONV (postoperative nausea and vomiting)    • Seasonal allergies    • Sleep apnea     Does not wear machine.   • Urinary tract infection    • Varicose vein of leg    • Venous insufficiency    • Vitamin D deficiency        Past Surgical History:   Procedure Laterality Date   • ABDOMINAL HYSTERECTOMY      Total   • BILATERAL SALPINGO OOPHORECTOMY     • BLADDER SUSPENSION     • COLONOSCOPY  2015   • ENDOSCOPY     • ENDOVENOUS ABLATION SAPHENOUS VEIN W/ LASER Bilateral    • OOPHORECTOMY Bilateral    • WISDOM TOOTH EXTRACTION         Social History     Socioeconomic History   • Marital status:    Tobacco Use   • Smoking status: Former Smoker     Quit date: 1986     Years since quittin.5   • Smokeless tobacco: Never Used   Vaping Use   • Vaping Use: Never used   Substance and Sexual Activity   • Alcohol use: Not Currently     Comment: recovering alcoholic   • Drug use: Never   • Sexual activity: Defer       Family History   Problem Relation Age of Onset   • Hypertension Mother    • Obesity Mother    • Hypertension Father    • Dementia Father    • Atrial fibrillation Father    • Obesity Sister    • Cancer Maternal Aunt         breast   • Breast cancer Maternal Aunt    • Cancer Maternal Grandmother         breast   • Breast cancer Maternal Grandmother        Prior to Admission  Medications:  Medications Prior to Admission   Medication Sig Dispense Refill Last Dose   • atorvastatin (LIPITOR) 20 MG tablet Take 1 tablet by mouth Daily. 90 tablet 3 12/3/2021 at 0600   • buPROPion XL (WELLBUTRIN XL) 300 MG 24 hr tablet Take 1 tablet by mouth Daily. 90 tablet 2 12/3/2021 at 0600   • clotrimazole (LOTRIMIN) 1 % cream Apply  topically to the appropriate area as directed Daily.   Past Week at Unknown time   • dilTIAZem CD (dilTIAZem CD) 180 MG 24 hr capsule Take 1 capsule by mouth every night at bedtime. 90 capsule 3 12/3/2021 at 0600   • Loratadine 10 MG capsule Take 10 mg by mouth Daily.   12/3/2021 at 0600   • metoprolol succinate XL (TOPROL-XL) 25 MG 24 hr tablet Take 1 tablet by mouth Daily. 90 tablet 3 12/3/2021 at 0600   • rivaroxaban (XARELTO) 20 MG tablet Take 1 tablet by mouth Every Night. 90 tablet 3 11/29/2021 at 0900   • estradiol (ESTRACE) 0.5 MG tablet Take 1 tablet by mouth Daily. 90 tablet 3 More than a month at Unknown time   • furosemide (Lasix) 40 MG tablet Take 1 tablet by mouth Daily. (Patient not taking: Reported on 12/2/2021) 30 tablet 2 Not Taking at Unknown time   • potassium chloride 10 MEQ CR tablet Take 1 tablet by mouth Daily. (Patient not taking: Reported on 12/2/2021) 30 tablet 2 Not Taking at Unknown time       Allergies:  Allergies   Allergen Reactions   • Codeine Itching        Vitals: Temp:  [98.1 °F (36.7 °C)] 98.1 °F (36.7 °C)  Heart Rate:  [64] 64  Resp:  [18] 18  BP: (185)/(80) 185/80    Review Of Systems:  Constitutional:  Negative for chills, fever, and unexpected weight change.  Respiratory:  Negative for cough, chest tightness, shortness of breath, and wheezing.  Cardiovascular:  Negative for chest pain, palpitations, and leg swelling.  Gastrointestinal:  Negative for abdominal distention, abdominal pain, Nausea, vomiting.  Neurological:  Negative for Weakness, numbness, and headaches.     Physical Exam:    General Appearance:  Alert, cooperative, in no  acute distress.   Lungs:   Clear to auscultation, respirations regular, even and                 unlabored.   Heart:  Regular rhythm and normal rate.   Abdomen:   Normal bowel sounds, no masses, no organomegaly. Soft, non-tender, non-distended   Neurologic: Alert and oriented x 3. Moves all four limbs equally       Plan: COLONOSCOPY (N/A)     Adelso Plummer MD  12/3/2021

## 2021-12-03 NOTE — ANESTHESIA PREPROCEDURE EVALUATION
Anesthesia Evaluation     Patient summary reviewed and Nursing notes reviewed   history of anesthetic complications: PONV  NPO Solid Status: > 8 hours  NPO Liquid Status: > 6 hours           Airway   Mallampati: II  TM distance: >3 FB  Neck ROM: full  Possible difficult intubation  Dental      Pulmonary    (+) sleep apnea,   Cardiovascular     (+) hypertension, valvular problems/murmurs, dysrhythmias,       Neuro/Psych  (+) psychiatric history,     GI/Hepatic/Renal/Endo    (+) obesity, morbid obesity,      Musculoskeletal     Abdominal    Substance History      OB/GYN          Other                        Anesthesia Plan    ASA 3     MAC     intravenous induction     Anesthetic plan, all risks, benefits, and alternatives have been provided, discussed and informed consent has been obtained with: patient.    Plan discussed with CRNA.

## 2021-12-03 NOTE — DISCHARGE INSTRUCTIONS
Please follow all post op instructions and follow up appointment time from your physician's office included in your discharge packet.  .   To assist you in voiding:  Drink plenty of fluids  Listen to running water while attempting to void.    If you are unable to urinate and you have an uncomfortable urge to void or it has been   6 hours since you were discharged, return to the Emergency Room    - Discharge patient to home (ambulatory).   - High fiber diet.   - Continue present medications.   - Hol Xarelto for 3 days   - Await pathology results.   - Repeat colonoscopy in 3 years for surveillance.   - Return to GI office in 8 weeks.

## 2021-12-08 LAB
LAB AP CASE REPORT: NORMAL
PATH REPORT.FINAL DX SPEC: NORMAL

## 2021-12-14 NOTE — ADDENDUM NOTE
Addendum  created 12/14/21 0906 by Toro Garcia CRNA    Intraprocedure Event edited, Intraprocedure Staff edited

## 2022-01-25 RX ORDER — ESTRADIOL 0.5 MG/1
0.5 TABLET ORAL DAILY
Qty: 90 TABLET | Refills: 1 | Status: SHIPPED | OUTPATIENT
Start: 2022-01-25 | End: 2022-06-30

## 2022-02-15 ENCOUNTER — OFFICE VISIT (OUTPATIENT)
Dept: GASTROENTEROLOGY | Facility: CLINIC | Age: 67
End: 2022-02-15

## 2022-02-15 VITALS
TEMPERATURE: 98.4 F | BODY MASS INDEX: 39.47 KG/M2 | RESPIRATION RATE: 20 BRPM | DIASTOLIC BLOOD PRESSURE: 72 MMHG | HEART RATE: 84 BPM | SYSTOLIC BLOOD PRESSURE: 165 MMHG | HEIGHT: 67 IN

## 2022-02-15 DIAGNOSIS — D12.6 ADENOMATOUS POLYP OF COLON, UNSPECIFIED PART OF COLON: Primary | ICD-10-CM

## 2022-02-15 DIAGNOSIS — E66.09 CLASS 2 OBESITY DUE TO EXCESS CALORIES WITHOUT SERIOUS COMORBIDITY WITH BODY MASS INDEX (BMI) OF 39.0 TO 39.9 IN ADULT: ICD-10-CM

## 2022-02-15 PROCEDURE — 99213 OFFICE O/P EST LOW 20 MIN: CPT | Performed by: NURSE PRACTITIONER

## 2022-02-15 NOTE — PATIENT INSTRUCTIONS
High fiber, low fat diet with liberal water intake.   Advised to exercise 30 minutes 4-5 days per week.  Advised to lose 20-25 pounds in the next 6-12 months.  Colonoscopy for surveillance in 3 years, December 2024.  Follow up: The patient will call back

## 2022-02-15 NOTE — PROGRESS NOTES
Follow Up Note     Date: 02/15/2022   Patient Name: Neetu Wick  MRN: [id number]  : 1955     Primary Care Provider: Ines Hancock MD     No chief complaint on file.    History of present illness:   2/15/2022  Neetu Wick is a 66 y.o. female who is here today for follow up after colonoscopy.    No problems or concerns today. She is doing well. No problems after colonoscopy.     Interval History:  2021  This patient deny any abdominal pain, change in bowel habit, hematochezia or melena.  Weight is stable. Pt denies nausea vomiting or odynophagia or dysphagia. There is no history of acid reflux. There is no history of anemia. No prior history of EGD.  Her last colonoscopy was in  at [place] and polyps removed as per patient.  She was been told it was tubular adenoma. No family history of colon cancer or any GI malignancy. No history of any abdominal surgery except hysterectomy and bilateral salpingo-oophorectomy. Denies alcohol abuse or cigarette smoking. Maternal grandmother had breast CA, Aunt lung CA.      Patient has a chronic atrial fibrillation and she is on Xarelto.    Subjective      Past Medical History:   Diagnosis Date   • Afib (HCC)    • Colon polyp 2021   • Depression    • Dyslipidemia    • Ear piercing    • Fracture     right ankle   • Fracture, radius 2015    left   • H/O echocardiogram     2016   • History of nuclear stress test     2016   • Hypertension    • Incontinence    • Murmur    • Obesity    • Ovarian failure    • Panic attacks    • Pedal edema    • PONV (postoperative nausea and vomiting)    • Seasonal allergies    • Sleep apnea     Does not wear machine.   • Urinary tract infection    • Varicose vein of leg    • Venous insufficiency    • Vitamin D deficiency      Past Surgical History:   Procedure Laterality Date   • ABDOMINAL HYSTERECTOMY      Total   • BILATERAL SALPINGO OOPHORECTOMY     • BLADDER SUSPENSION     • COLONOSCOPY  2015    • COLONOSCOPY N/A 12/3/2021    Procedure: COLONOSCOPY with polypectomy x9;  Surgeon: Adelso Plummer MD;  Location: [facility] ENDOSCOPY;  Service: Gastroenterology;  Laterality: N/A;   • ENDOSCOPY     • ENDOVENOUS ABLATION SAPHENOUS VEIN W/ LASER Bilateral    • OOPHORECTOMY Bilateral    • WISDOM TOOTH EXTRACTION       Family History   Problem Relation Age of Onset   • Hypertension Mother    • Obesity Mother    • Hypertension Father    • Dementia Father    • Atrial fibrillation Father    • Obesity Sister    • Cancer Maternal Aunt         breast   • Breast cancer Maternal Aunt    • Cancer Maternal Grandmother         breast   • Breast cancer Maternal Grandmother    • Colon cancer Neg Hx      Social History     Socioeconomic History   • Marital status:    Tobacco Use   • Smoking status: Former Smoker     Types: Cigarettes     Start date:      Quit date: 1986     Years since quittin.7   • Smokeless tobacco: Never Used   Vaping Use   • Vaping Use: Never used   Substance and Sexual Activity   • Alcohol use: Not Currently     Comment: recovering alcoholic   • Drug use: Never   • Sexual activity: Defer       Current Outpatient Medications:   •  atorvastatin (LIPITOR) 20 MG tablet, Take 1 tablet by mouth Daily., Disp: 90 tablet, Rfl: 3  •  buPROPion XL (WELLBUTRIN XL) 300 MG 24 hr tablet, Take 1 tablet by mouth Daily., Disp: 90 tablet, Rfl: 2  •  clotrimazole (LOTRIMIN) 1 % cream, Apply  topically to the appropriate area as directed Daily., Disp: , Rfl:   •  dilTIAZem CD (dilTIAZem CD) 180 MG 24 hr capsule, Take 1 capsule by mouth every night at bedtime., Disp: 90 capsule, Rfl: 3  •  estradiol (ESTRACE) 0.5 MG tablet, Take 1 tablet by mouth Daily., Disp: 90 tablet, Rfl: 1  •  Loratadine 10 MG capsule, Take 10 mg by mouth Daily., Disp: , Rfl:   •  metoprolol succinate XL (TOPROL-XL) 25 MG 24 hr tablet, Take 1 tablet by mouth Daily., Disp: 90 tablet, Rfl: 3  •  rivaroxaban (XARELTO) 20 MG  "tablet, Take 1 tablet by mouth Every Night., Disp: 90 tablet, Rfl: 3     Allergies   Allergen Reactions   • Codeine Itching     The following portions of the patient's history were reviewed and updated as appropriate: allergies, current medications, past family history, past medical history, past social history, past surgical history and problem list.  Objective     Physical Exam  Vitals and nursing note reviewed.   Constitutional:       General: She is not in acute distress.     Appearance: Normal appearance. She is well-developed.   HENT:      Head: Normocephalic and atraumatic.      Mouth/Throat:      Mouth: Mucous membranes are not pale, not dry and not cyanotic.   Eyes:      General: Lids are normal.   Neck:      Trachea: Trachea normal.   Cardiovascular:      Rate and Rhythm: Normal rate.   Pulmonary:      Effort: Pulmonary effort is normal. No respiratory distress.      Breath sounds: Normal breath sounds.   Abdominal:      Tenderness: There is no abdominal tenderness.   Skin:     General: Skin is warm and dry.   Neurological:      Mental Status: She is alert and oriented to person, place, and time.   Psychiatric:         Mood and Affect: Mood normal.         Speech: Speech normal.         Behavior: Behavior normal. Behavior is cooperative.       Vitals:    02/15/22 1312   BP: 165/72   Pulse: 84   Resp: 20   Temp: 98.4 °F (36.9 °C)   TempSrc: Infrared   Weight: Comment: refused   Height: 170.2 cm (67\")     Body mass index is 39.47 kg/m².     Results Review:   I reviewed the patient's new clinical results.    Admission on 12/03/2021, Discharged on 12/03/2021   Component Date Value Ref Range Status   • Case Report 12/03/2021    Final                    Value:Surgical Pathology Report                         Case: [id number]                                  Authorizing Provider:  Adelso Plummer MD  Collected:           12/03/2021 10:41 AM          Ordering Location:     [facility]" Received:            12/03/2021 02:00 PM                                 [facility]                                                                    Pathologist:           Roslyn Souza DO                                                           Specimens:   1) - Large Intestine, Cecum                                                                         2) - Large Intestine, Right / Ascending Colon                                                       3) - Large Intestine, Transverse Colon, x4                                                          4) - Large Intestine, Left / Descending Colon                                                       5) - Large Intestine, Sigmoid Colon, x2                                                   • Final Diagnosis 12/03/2021    Final                    Value:This result contains rich text formatting which cannot be displayed here.      No radiology results for the last 90 days.     Colonoscopy dated 12/3/2021 per Dr. Plummer  - One 6 mm polyp in the cecum, removed with a cold snare. Resected and retrieved.  - One 3 to 4 mm polyp in the mid ascending colon, removed with a cold snare. Resected and retrieved.  - Four 2 to 4 mm polyps in the transverse colon, removed with a cold snare. Resected and retrieved.  - One 3 to 4 mm polyp in the proximal descending colon, removed with a cold snare. Resected and retrieved.  - Two 2 to 3 mm polyps in the sigmoid colon, removed with a cold snare. Resected and retrieved.  - Diverticulosis in the sigmoid colon.  -Cecum polyp biopsy, ascending colon polyp biopsy, transverse colon polyp x4 biopsy, descending colon polyp biopsy and sigmoid colon polyp x2 biopsies with tubular adenomas, no high-grade dysplasia.    Assessment / Plan      1. Adenomatous polyp of colon, unspecified part of colon  Colonoscopy with multiple polyps removed, tubular adenomas without dysplasia.  There is no family history of colon cancer.  Colonoscopy for  surveillance in 3 years, December 2024.    2. Class 3 severe obesity due to excess calories without serious comorbidity with body mass index (BMI) of 40.0 to 44.9 in adult (HCC)  Previous BMI 39.5 in December 2021, patient did not want to wait today.  Liver enzymes normal on recent labs.  No anemia.  High fiber, low fat diet with liberal water intake.   Advised to exercise 30 minutes 4-5 days per week.  Advised to lose 20-25 pounds in the next 6-12 months.    6/14/2021  Her recent lab work reviewed and has normal liver enzymes.  No recent imaging studies to review.   Given her obesity she may have nonalcoholic fatty liver disease  We had a detailed discussion on dietary changes lifestyle changes and try to lose some weight to prevent the development of any fatty liver disease and progression.  I have also given details on Mediterranean diet.    Patient Instructions   High fiber, low fat diet with liberal water intake.   Advised to exercise 30 minutes 4-5 days per week.  Advised to lose 20-25 pounds in the next 6-12 months.  Colonoscopy for surveillance in 3 years, December 2024.  Follow up: The patient will call back    Alayna Leonardo, APRN  2/15/2022    Please note that portions of this note may have been completed with a voice recognition program. Efforts were made to edit the dictations, but occasionally words are mistranscribed.

## 2022-03-02 ENCOUNTER — TELEPHONE (OUTPATIENT)
Dept: INTERNAL MEDICINE | Facility: CLINIC | Age: 67
End: 2022-03-02

## 2022-03-02 NOTE — TELEPHONE ENCOUNTER
Caller: Neetu Wick    Relationship: Self    Best call back number: [phone]    What orders are you requesting (i.e. lab or imaging): URINALYSIS DUE TO BLOOD IN URINE    In what timeframe would the patient need to come in:   HAS APPOINTMENT TOMORROW 03/03  PM.  REQUESTING TO HAVE URINALYSIS TOMORROW MORNING BEFORE APPOINTMENT    Where will you receive your lab/imaging services: AT OFFICE    Additional notes:

## 2022-03-03 ENCOUNTER — OFFICE VISIT (OUTPATIENT)
Dept: INTERNAL MEDICINE | Facility: CLINIC | Age: 67
End: 2022-03-03

## 2022-03-03 VITALS
DIASTOLIC BLOOD PRESSURE: 81 MMHG | OXYGEN SATURATION: 98 % | SYSTOLIC BLOOD PRESSURE: 143 MMHG | TEMPERATURE: 96.8 F | HEART RATE: 63 BPM | WEIGHT: 271 LBS | RESPIRATION RATE: 16 BRPM | HEIGHT: 67 IN | BODY MASS INDEX: 42.53 KG/M2

## 2022-03-03 DIAGNOSIS — I10 BENIGN ESSENTIAL HYPERTENSION: Primary | ICD-10-CM

## 2022-03-03 DIAGNOSIS — N30.01 ACUTE CYSTITIS WITH HEMATURIA: ICD-10-CM

## 2022-03-03 DIAGNOSIS — N39.0 ACUTE URINARY TRACT INFECTION: ICD-10-CM

## 2022-03-03 LAB
BILIRUB BLD-MCNC: NEGATIVE MG/DL
CLARITY, POC: ABNORMAL
COLOR UR: YELLOW
EXPIRATION DATE: ABNORMAL
GLUCOSE UR STRIP-MCNC: NEGATIVE MG/DL
KETONES UR QL: NEGATIVE
LEUKOCYTE EST, POC: ABNORMAL
Lab: ABNORMAL
NITRITE UR-MCNC: NEGATIVE MG/ML
PH UR: 6 [PH] (ref 5–8)
PROT UR STRIP-MCNC: NEGATIVE MG/DL
RBC # UR STRIP: ABNORMAL /UL
SP GR UR: 1.01 (ref 1–1.03)
UROBILINOGEN UR QL: NORMAL

## 2022-03-03 PROCEDURE — 81003 URINALYSIS AUTO W/O SCOPE: CPT | Performed by: INTERNAL MEDICINE

## 2022-03-03 PROCEDURE — 99214 OFFICE O/P EST MOD 30 MIN: CPT | Performed by: INTERNAL MEDICINE

## 2022-03-03 RX ORDER — NITROFURANTOIN 25; 75 MG/1; MG/1
100 CAPSULE ORAL 2 TIMES DAILY
Qty: 20 CAPSULE | Refills: 0 | Status: SHIPPED | OUTPATIENT
Start: 2022-03-03 | End: 2022-03-10

## 2022-03-03 NOTE — PROGRESS NOTES
"Chief Complaint  Urinary Tract Infection and Hypertension    Subjective          Neetu Wick presents to [facility]  History of Present Illness  HPI: Patient is here to follow up on the blood pressure  The patient is taking the blood pressure medications as prescribed and has had no side effects. Patient is here complaining of frequent urination and had blood in the urine - pinkish    since yesterday   , she has also  been drinking plenty of  water , she also complains of  Right   flank  And suprapubic discomfort since the past few days but it has improved.  Hypertension   Pertinent negatives include no chest pain, palpitations or shortness of breath.  Answers for HPI/ROS submitted by the patient on 3/3/2022  Please describe your symptoms.: yesterday urine was red, back and abdominal pain; today urine appears normal, back pain continues, but not as bad as yesterday.  Have you had these symptoms before?: Yes  How long have you been having these symptoms?: 1-4 days  Please list any medications you are currently taking for this condition.: medications have not changed  Please describe any probable cause for these symptoms. : uti? more exercise than usual (went hiking), to the best of my knowledge I did not eat any foods that would turn my urine red  What is the primary reason for your visit?: Other      Objective   Vital Signs:   /81   Pulse 63   Temp 96.8 °F (36 °C)   Resp 16   Ht 170.2 cm (67.01\")   Wt 123 kg (271 lb)   SpO2 98%   BMI 42.43 kg/m²     Physical Exam  Vitals and nursing note reviewed.   Constitutional:       General: She is not in acute distress.     Appearance: Normal appearance. She is not diaphoretic.   HENT:      Head: Normocephalic and atraumatic.      Right Ear: External ear normal.      Left Ear: External ear normal.      Nose: Nose normal.   Eyes:      Extraocular Movements: Extraocular movements intact.      Conjunctiva/sclera: Conjunctivae normal. "   Neck:      Trachea: Trachea normal.   Cardiovascular:      Rate and Rhythm: Normal rate and regular rhythm.      Heart sounds: Normal heart sounds.   Pulmonary:      Effort: Pulmonary effort is normal. No respiratory distress.      Breath sounds: Normal breath sounds.   Abdominal:      General: Abdomen is flat.      Comments: Suprapubic discomfort    Musculoskeletal:      Cervical back: Neck supple.      Comments: Moves all limbs   Skin:     General: Skin is warm.   Neurological:      Mental Status: She is alert and oriented to person, place, and time.      Comments: No gross motor or sensory deficits        Result Review :     UA    Urinalysis 3/3/22   Ketones, UA Negative   Leukocytes, UA Large (3+) (A)   (A) Abnormal value                      Assessment and Plan    Diagnoses and all orders for this visit:    1. Benign essential hypertension (Primary)    2. Acute urinary tract infection  -     POCT urinalysis dipstick, automated  -     Urine Culture - Urine, Urine, Clean Catch  -     nitrofurantoin, macrocrystal-monohydrate, (Macrobid) 100 MG capsule; Take 1 capsule by mouth 2 (Two) Times a Day.  Dispense: 20 capsule; Refill: 0    3. Acute cystitis with hematuria  -     Urine Culture - Urine, Urine, Clean Catch  -     nitrofurantoin, macrocrystal-monohydrate, (Macrobid) 100 MG capsule; Take 1 capsule by mouth 2 (Two) Times a Day.  Dispense: 20 capsule; Refill: 0    Plan:  1.  Benign essential hypertension: Will continue current medication, low-sodium diet advised, Counseled to regularly check BP at home with goal averaging <130/80.   2.  Acute urinary tract infection  : In office urine analysis done ,will order urine culture ,oral hydration advised, will start oral antibiotics  3. Acute cystitis : In office urine analysis done ,will order urine culture ,oral hydration advised, will start oral antibiotics     I spent 30 minutes caring for Neetu on this date of service. This time includes time spent by me in the  following activities:preparing for the visit, reviewing tests, performing a medically appropriate examination and/or evaluation , counseling and educating the patient/family/caregiver, ordering medications, tests, or procedures and documenting information in the medical record  Follow Up    Patient was given instructions and counseling regarding her condition or for health maintenance advice. Please see specific information pulled into the AVS if appropriate.

## 2022-03-09 LAB
BACTERIA UR CULT: ABNORMAL
BACTERIA UR CULT: ABNORMAL
OTHER ANTIBIOTIC SUSC ISLT: ABNORMAL

## 2022-03-10 RX ORDER — SULFAMETHOXAZOLE AND TRIMETHOPRIM 800; 160 MG/1; MG/1
1 TABLET ORAL 2 TIMES DAILY
Qty: 20 TABLET | Refills: 0 | Status: SHIPPED | OUTPATIENT
Start: 2022-03-10 | End: 2022-03-20

## 2022-03-20 ENCOUNTER — HEALTH MAINTENANCE LETTER (OUTPATIENT)
Age: 67
End: 2022-03-20

## 2022-03-23 DIAGNOSIS — F33.0 MILD EPISODE OF RECURRENT MAJOR DEPRESSIVE DISORDER: ICD-10-CM

## 2022-03-23 RX ORDER — BUPROPION HYDROCHLORIDE 300 MG/1
TABLET ORAL
Qty: 90 TABLET | Refills: 2 | Status: SHIPPED | OUTPATIENT
Start: 2022-03-23 | End: 2022-06-16 | Stop reason: SDUPTHER

## 2022-03-23 NOTE — TELEPHONE ENCOUNTER
Rx Refill Note  Requested Prescriptions     Pending Prescriptions Disp Refills   • buPROPion XL (WELLBUTRIN XL) 300 MG 24 hr tablet [Pharmacy Med Name: BUPROPION HYDROCHLORIDE ER (XL) 300 MG Tablet Extended Release 24 Hour] 90 tablet 2     Sig: TAKE 1 TABLET EVERY DAY      Last office visit with prescribing clinician: 3/3/2022      Next office visit with prescribing clinician: 3/28/2022            BRYSON FRANKEL MA  03/23/22, 13:21 EDT

## 2022-03-24 LAB
ALBUMIN SERPL-MCNC: 4 G/DL (ref 3.5–5.2)
ALBUMIN/CREAT UR: 28 MG/G CREAT (ref 0–29)
ALBUMIN/GLOB SERPL: 1.6 G/DL
ALP SERPL-CCNC: 114 U/L (ref 39–117)
ALT SERPL-CCNC: 10 U/L (ref 1–33)
AST SERPL-CCNC: 13 U/L (ref 1–32)
BASOPHILS # BLD AUTO: 0.07 10*3/MM3 (ref 0–0.2)
BASOPHILS NFR BLD AUTO: 1.4 % (ref 0–1.5)
BILIRUB SERPL-MCNC: 0.8 MG/DL (ref 0–1.2)
BUN SERPL-MCNC: 14 MG/DL (ref 8–23)
BUN/CREAT SERPL: 15.9 (ref 7–25)
CALCIUM SERPL-MCNC: 9 MG/DL (ref 8.6–10.5)
CHLORIDE SERPL-SCNC: 105 MMOL/L (ref 98–107)
CHOLEST SERPL-MCNC: 164 MG/DL (ref 0–200)
CO2 SERPL-SCNC: 25 MMOL/L (ref 22–29)
CREAT SERPL-MCNC: 0.88 MG/DL (ref 0.57–1)
CREAT UR-MCNC: 80.8 MG/DL
EGFRCR SERPLBLD CKD-EPI 2021: 71.7 ML/MIN/1.73
EOSINOPHIL # BLD AUTO: 0.16 10*3/MM3 (ref 0–0.4)
EOSINOPHIL NFR BLD AUTO: 3.1 % (ref 0.3–6.2)
ERYTHROCYTE [DISTWIDTH] IN BLOOD BY AUTOMATED COUNT: 12.4 % (ref 12.3–15.4)
GLOBULIN SER CALC-MCNC: 2.5 GM/DL
GLUCOSE SERPL-MCNC: 110 MG/DL (ref 65–99)
HBA1C MFR BLD: 6.2 % (ref 4.8–5.6)
HCT VFR BLD AUTO: 38.6 % (ref 34–46.6)
HDLC SERPL-MCNC: 60 MG/DL (ref 40–60)
HGB BLD-MCNC: 12.9 G/DL (ref 12–15.9)
IMM GRANULOCYTES # BLD AUTO: 0.01 10*3/MM3 (ref 0–0.05)
IMM GRANULOCYTES NFR BLD AUTO: 0.2 % (ref 0–0.5)
LDLC SERPL CALC-MCNC: 84 MG/DL (ref 0–100)
LYMPHOCYTES # BLD AUTO: 1.2 10*3/MM3 (ref 0.7–3.1)
LYMPHOCYTES NFR BLD AUTO: 23.6 % (ref 19.6–45.3)
MCH RBC QN AUTO: 31.1 PG (ref 26.6–33)
MCHC RBC AUTO-ENTMCNC: 33.4 G/DL (ref 31.5–35.7)
MCV RBC AUTO: 93 FL (ref 79–97)
MICROALBUMIN UR-MCNC: 23 UG/ML
MONOCYTES # BLD AUTO: 0.47 10*3/MM3 (ref 0.1–0.9)
MONOCYTES NFR BLD AUTO: 9.3 % (ref 5–12)
NEUTROPHILS # BLD AUTO: 3.17 10*3/MM3 (ref 1.7–7)
NEUTROPHILS NFR BLD AUTO: 62.4 % (ref 42.7–76)
NRBC BLD AUTO-RTO: 0 /100 WBC (ref 0–0.2)
PLATELET # BLD AUTO: 284 10*3/MM3 (ref 140–450)
POTASSIUM SERPL-SCNC: 4.3 MMOL/L (ref 3.5–5.2)
PROT SERPL-MCNC: 6.5 G/DL (ref 6–8.5)
RBC # BLD AUTO: 4.15 10*6/MM3 (ref 3.77–5.28)
SODIUM SERPL-SCNC: 141 MMOL/L (ref 136–145)
TRIGL SERPL-MCNC: 115 MG/DL (ref 0–150)
VLDLC SERPL CALC-MCNC: 20 MG/DL (ref 5–40)
WBC # BLD AUTO: 5.08 10*3/MM3 (ref 3.4–10.8)

## 2022-03-28 ENCOUNTER — OFFICE VISIT (OUTPATIENT)
Dept: INTERNAL MEDICINE | Facility: CLINIC | Age: 67
End: 2022-03-28

## 2022-03-28 VITALS
HEIGHT: 67 IN | TEMPERATURE: 97.1 F | WEIGHT: 269 LBS | SYSTOLIC BLOOD PRESSURE: 147 MMHG | BODY MASS INDEX: 42.22 KG/M2 | RESPIRATION RATE: 16 BRPM | HEART RATE: 59 BPM | OXYGEN SATURATION: 97 % | DIASTOLIC BLOOD PRESSURE: 77 MMHG

## 2022-03-28 DIAGNOSIS — I10 BENIGN ESSENTIAL HYPERTENSION: Primary | ICD-10-CM

## 2022-03-28 DIAGNOSIS — I48.0 PAROXYSMAL ATRIAL FIBRILLATION: ICD-10-CM

## 2022-03-28 DIAGNOSIS — R73.01 IMPAIRED FASTING GLUCOSE: ICD-10-CM

## 2022-03-28 DIAGNOSIS — E78.2 MIXED HYPERLIPIDEMIA: ICD-10-CM

## 2022-03-28 PROCEDURE — 99214 OFFICE O/P EST MOD 30 MIN: CPT | Performed by: INTERNAL MEDICINE

## 2022-03-28 NOTE — PROGRESS NOTES
"Chief Complaint  Hypertension and Hyperlipidemia    Subjective          Neetu Wick presents to [facility]  History of Present Illness  HPI: Patient is here to follow up on the blood pressure  The patient is taking the blood pressure medications as prescribed and has had no side effects. The patient is also here to follow up on the cholesterol and is trying to follow a diet. The patient had  lab work done .  She is also here to follow up on atrial fibrillation and follows  Up on cardiology  And is on eliquis ,  The patient also needs refills on medications .   Hyperlipidemia   Pertinent negatives include no chest pain or shortness of breath.   Hypertension   Pertinent negatives include no chest pain, palpitations or shortness of breath.    Objective   Vital Signs:   /77   Pulse 59   Temp 97.1 °F (36.2 °C)   Resp 16   Ht 170.2 cm (67.01\")   Wt 122 kg (269 lb)   SpO2 97%   BMI 42.12 kg/m²     BMI is above normal parameters. Recommendations: educational material discussed/shared in after visit summary, exercise counseling/recommendations and nutrition counseling/recommendations       Physical Exam  Vitals and nursing note reviewed.   Constitutional:       General: She is not in acute distress.     Appearance: Normal appearance. She is not diaphoretic.   HENT:      Head: Normocephalic and atraumatic.      Right Ear: External ear normal.      Left Ear: External ear normal.      Nose: Nose normal.   Eyes:      Extraocular Movements: Extraocular movements intact.      Conjunctiva/sclera: Conjunctivae normal.   Neck:      Trachea: Trachea normal.   Cardiovascular:      Rate and Rhythm: Normal rate and regular rhythm.      Heart sounds: Normal heart sounds.   Pulmonary:      Effort: Pulmonary effort is normal. No respiratory distress.      Breath sounds: Normal breath sounds.   Abdominal:      General: Abdomen is flat.   Musculoskeletal:      Cervical back: Neck supple.      " Comments: Moves all limbs   Skin:     General: Skin is warm.   Neurological:      Mental Status: She is alert and oriented to person, place, and time.      Comments: No gross motor or sensory deficits        Result Review :     Common labs    Common Labsle 5/13/21 5/13/21 5/13/21 5/13/21 9/3/21 9/3/21 9/3/21 9/3/21 3/23/22 3/23/22 3/23/22 3/23/22 3/23/22    1010 1010 1010 1010 1101 1101 1101 1101 1057 1057 1057 1057 1057   Glucose  74    105 (A)    110 (A)      BUN  13    17    14      Creatinine  0.79    0.76    0.88      eGFR Non African Am  73    76          eGFR  Am  88    92          Sodium  145    139    141      Potassium  4.7    4.1    4.3      Chloride  108 (A)    106    105      Calcium  8.9    9.0    9.0      Total Protein  6.4    6.1    6.5      Albumin  4.00    4.10    4.00      Total Bilirubin  0.6    0.8    0.8      Alkaline Phosphatase  103    108    114      AST (SGOT)  11    17    13      ALT (SGPT)  8    14    10      WBC 4.88    6.18    5.08       Hemoglobin 12.7    12.5    12.9       Hematocrit 38.5    38.2    38.6       Platelets 260    272    284       Total Cholesterol   135    140    164     Triglycerides   73    72    115     HDL Cholesterol   60    57    60     LDL Cholesterol    60    69    84     Hemoglobin A1C    6.00 (A)    6.00 (A)    6.20 (A)    Microalbumin, Urine             23.0   (A) Abnormal value       Comments are available for some flowsheets but are not being displayed.                     Assessment and Plan    Diagnoses and all orders for this visit:    1. Benign essential hypertension (Primary)    2. Mixed hyperlipidemia  -     CBC & Differential  -     Comprehensive Metabolic Panel  -     Lipid Panel    3. Impaired fasting glucose  -     Hemoglobin A1c    4. Paroxysmal atrial fibrillation (HCC)       Plan:  1.  Benign essential hypertension: Will continue current medication, low-sodium diet advised, Counseled to regularly check BP at home with goal averaging  <130/80.   2.mixed hyperlipidemia:  reviewed  fasting CMP and lipid panel.  Diet and exercise counseled,  Will continue current medications  3. impaired glucose   :   reviewed  fasting CMP  and hba1c  6.2  , diet and exercise counseled   4. Atrial fibrillation: rate controlled, on anticoagulation ,   will continue current medications , per cardiology , labs reviewed   I spent 30 minutes caring for Neetu on this date of service. This time includes time spent by me in the following activities:preparing for the visit, reviewing tests, performing a medically appropriate examination and/or evaluation , counseling and educating the patient/family/caregiver, ordering medications, tests, or procedures and documenting information in the medical record  Follow Up   Return in about 16 weeks (around 7/18/2022).  Patient was given instructions and counseling regarding her condition or for health maintenance advice. Please see specific information pulled into the AVS if appropriate.

## 2022-05-13 ENCOUNTER — OFFICE VISIT (OUTPATIENT)
Dept: INTERNAL MEDICINE | Facility: CLINIC | Age: 67
End: 2022-05-13

## 2022-05-13 VITALS
TEMPERATURE: 97.9 F | DIASTOLIC BLOOD PRESSURE: 68 MMHG | HEIGHT: 67 IN | BODY MASS INDEX: 42.38 KG/M2 | HEART RATE: 57 BPM | SYSTOLIC BLOOD PRESSURE: 130 MMHG | WEIGHT: 270 LBS | RESPIRATION RATE: 16 BRPM | OXYGEN SATURATION: 96 %

## 2022-05-13 DIAGNOSIS — R60.0 BILATERAL LOWER EXTREMITY EDEMA: ICD-10-CM

## 2022-05-13 DIAGNOSIS — R60.0 BILATERAL LOWER EXTREMITY EDEMA: Primary | ICD-10-CM

## 2022-05-13 PROCEDURE — 99213 OFFICE O/P EST LOW 20 MIN: CPT | Performed by: NURSE PRACTITIONER

## 2022-05-13 RX ORDER — ATORVASTATIN CALCIUM 20 MG/1
TABLET, FILM COATED ORAL
COMMUNITY
Start: 2022-04-06 | End: 2022-06-16 | Stop reason: SDUPTHER

## 2022-05-13 RX ORDER — BUMETANIDE 0.5 MG/1
TABLET ORAL
Qty: 90 TABLET | Refills: 0 | Status: SHIPPED | OUTPATIENT
Start: 2022-05-13 | End: 2022-06-15

## 2022-05-13 RX ORDER — BUMETANIDE 0.5 MG/1
0.5 TABLET ORAL DAILY PRN
Qty: 30 TABLET | Refills: 0 | Status: SHIPPED | OUTPATIENT
Start: 2022-05-13 | End: 2022-05-13

## 2022-05-13 NOTE — PROGRESS NOTES
"  Office Visit      Patient Name: Neetu Wick  : 1955   MRN: [id number]   Care Team: Patient Care Team:  Ines Hancock MD as PCP - General (Internal Medicine)    Chief Complaint  Lower Extremity Issue (Lower extremity edema)    Subjective     Subjective      Neetu Wick presents to [facility] for lower extremity edema x1 month. Left is worse than right. Nature: tightness, slight pain. Denies warm to touch, redness. Patient is on eliquis for afib. No history of blood clots. Denies chest pain, SOA, fever, aches, chills. Patient is a . Sits a lot, besides on Sundays. Does not elevate legs or wear compression stockings like she was recommended to do. Does have history of venous insuffiencey. Discussed diet; does not monitor sodium intake.     Review of Systems   Constitutional: Negative for chills, fever and unexpected weight gain.   Respiratory: Negative for cough, chest tightness, shortness of breath and wheezing.    Cardiovascular: Negative for chest pain.   Gastrointestinal: Negative for abdominal pain, diarrhea, nausea and vomiting.   Musculoskeletal: Negative for gait problem.   Skin: Negative for color change and bruise.   Neurological: Negative for dizziness and headache.   All other systems reviewed and are negative.        Objective     Objective   Vital Signs:   /68   Pulse 57   Temp 97.9 °F (36.6 °C) (Temporal)   Resp 16   Ht 170.2 cm (67\")   Wt 122 kg (270 lb)   SpO2 96%   BMI 42.29 kg/m²     Physical Exam  Constitutional:       Appearance: Normal appearance. She is obese.   HENT:      Head: Normocephalic and atraumatic.   Cardiovascular:      Rate and Rhythm: Regular rhythm.      Pulses: Normal pulses.   Pulmonary:      Breath sounds: Normal breath sounds.   Musculoskeletal:      Right lower le+ Pitting Edema present.      Left lower le+ Pitting Edema present.      Comments: No warmth, erythema   Neurological:      Mental Status: She " is alert and oriented to person, place, and time.   Psychiatric:         Mood and Affect: Mood normal.          Assessment / Plan      Assessment & Plan   Problem List Items Addressed This Visit    None     Visit Diagnoses     Bilateral lower extremity edema    -  Primary    Relevant Medications    bumetanide (BUMEX) 0.5 MG tablet           Discussion Summary  - Wear compression stockings   - Reduce sodium in diet  - Elevate lower extremities, no prolonged sitting or standing  - Bumex 0.5 mg PRN for edema  - Discussed red flag signs; worsening edema especially unilateral, erythema, warmth, pain, chest pain, SOA, fever      Follow Up   Return in about 4 weeks (around 6/10/2022) for Recheck Edema.  Patient was given instructions and counseling regarding her condition or for health maintenance advice. Please see specific information pulled into the AVS if appropriate.     LOBO Leong  [facility] [place]

## 2022-05-15 ENCOUNTER — HEALTH MAINTENANCE LETTER (OUTPATIENT)
Age: 67
End: 2022-05-15

## 2022-06-14 NOTE — PROGRESS NOTES
[facility]  Office Progress Note  Neetu Wick  1955  [address] [address]       Visit Date: 06/15/22    PCP: Ines Hancock MD  [address]  [place] [address]    IDENTIFICATION: A 66 y.o. female  originally from [place]    PROBLEM LIST:   1. Paroxysmal atrial fibrillation  1. 2017-initial presentation.    2. 2016 echo ([facility]) EF wnl, no valvulopathy- DD  2. CAD   1. 5/18 [facility] OSH-normal perfusion EF not consulted  3. HL   1. 5/21 135/73/60/60 A1c 6  2. 3/22 164/115/60/84   4. Type 2 diabetes mellitus-onset?  1. 3/22 A1c 6.2  5. HTN  6. Bilat GSV (veins stripping) ablation OSH  9/21 RLE venous no DVT  7. Morbid obesity      CC:   Chief Complaint   Patient presents with   • Paroxysmal Atrial Fibrillation       Allergies  Allergies   Allergen Reactions   • Codeine Itching       Current Medications    Current Outpatient Medications:   •  atorvastatin (LIPITOR) 20 MG tablet, , Disp: , Rfl:   •  buPROPion XL (WELLBUTRIN XL) 300 MG 24 hr tablet, TAKE 1 TABLET EVERY DAY, Disp: 90 tablet, Rfl: 2  •  clotrimazole (LOTRIMIN) 1 % cream, Apply  topically to the appropriate area as directed Daily., Disp: , Rfl:   •  dilTIAZem CD (dilTIAZem CD) 180 MG 24 hr capsule, Take 1 capsule by mouth every night at bedtime., Disp: 90 capsule, Rfl: 3  •  estradiol (ESTRACE) 0.5 MG tablet, Take 1 tablet by mouth Daily., Disp: 90 tablet, Rfl: 1  •  Loratadine 10 MG capsule, Take 10 mg by mouth Daily., Disp: , Rfl:   •  metoprolol succinate XL (TOPROL-XL) 25 MG 24 hr tablet, Take 1 tablet by mouth Daily., Disp: 90 tablet, Rfl: 3  •  rivaroxaban (XARELTO) 20 MG tablet, Take 1 tablet by mouth Every Night., Disp: 90 tablet, Rfl: 3      History of Present Illness   Neetu Wick is a 66 y.o. year old female here for follow up.    No cardiac complaints.  Specifically no recurrence of atrial fibrillation that she can state.  She does continue to have asymmetric lower extremity edema.  " She has not been wearing compression stockings with ankle and heat.  States when she elevates at night and improves transiently but then recurs in the a.m.  She has had no skin breakdown.      OBJECTIVE:  Vitals:    06/15/22 1056   BP: 152/84   BP Location: Right arm   Patient Position: Sitting   Cuff Size: Adult   Pulse: 66   SpO2: 96%   Weight: 122 kg (269 lb)   Height: 170.2 cm (67\")     Body mass index is 42.13 kg/m².    Constitutional:       Appearance: Healthy appearance. Not in distress.   Neck:      Vascular: No JVR. JVD normal.   Pulmonary:      Effort: Pulmonary effort is normal.      Breath sounds: Normal breath sounds. No wheezing. No rhonchi. No rales.   Chest:      Chest wall: Not tender to palpatation.   Cardiovascular:      PMI at left midclavicular line. Normal rate. Regular rhythm. Normal S1. Normal S2.      Murmurs: There is no murmur.      No gallop. No click. No rub.   Pulses:     Intact distal pulses.   Edema:     Peripheral edema present.     Thigh: bilateral pitting edema of the thigh.     Pretibial: bilateral pitting edema of the pretibial area.     Ankle: bilateral pitting edema of the ankle.     Feet: bilateral pitting edema of the feet.     Comments: Left greater than right edema  Abdominal:      General: Bowel sounds are normal.      Palpations: Abdomen is soft.      Tenderness: There is no abdominal tenderness.   Musculoskeletal: Normal range of motion.         General: No tenderness. Skin:     General: Skin is warm and dry.   Neurological:      General: No focal deficit present.      Mental Status: Alert and oriented to person, place and time.         Diagnostic Data:  Procedures      ASSESSMENT:   Diagnosis Plan   1. Limb swelling     2. Paroxysmal atrial fibrillation (HCC)     3. Primary hypertension     4. Mixed hyperlipidemia         PLAN:  Limb swelling referral to vascular specialist to consider mechanical measures    Paroxysmal A. fib [id number] 2 maintaining sinus mechanism " and anticoagulated    Hypertension controlled metoprolol diltiazem    Mixed dyslipidemia controlled on statin therapy          Pipo Joe MD, FACC

## 2022-06-15 ENCOUNTER — OFFICE VISIT (OUTPATIENT)
Dept: CARDIOLOGY | Facility: CLINIC | Age: 67
End: 2022-06-15

## 2022-06-15 VITALS
DIASTOLIC BLOOD PRESSURE: 84 MMHG | SYSTOLIC BLOOD PRESSURE: 152 MMHG | WEIGHT: 269 LBS | BODY MASS INDEX: 42.22 KG/M2 | OXYGEN SATURATION: 96 % | HEART RATE: 66 BPM | HEIGHT: 67 IN

## 2022-06-15 DIAGNOSIS — E78.2 MIXED HYPERLIPIDEMIA: ICD-10-CM

## 2022-06-15 DIAGNOSIS — M79.89 LIMB SWELLING: Primary | ICD-10-CM

## 2022-06-15 DIAGNOSIS — I10 PRIMARY HYPERTENSION: ICD-10-CM

## 2022-06-15 DIAGNOSIS — I48.0 PAROXYSMAL ATRIAL FIBRILLATION: ICD-10-CM

## 2022-06-15 LAB
ALBUMIN SERPL-MCNC: 4.1 G/DL (ref 3.5–5.2)
ALBUMIN/GLOB SERPL: 1.5 G/DL
ALP SERPL-CCNC: 109 U/L (ref 39–117)
ALT SERPL-CCNC: 11 U/L (ref 1–33)
AST SERPL-CCNC: 14 U/L (ref 1–32)
BASOPHILS # BLD AUTO: 0.05 10*3/MM3 (ref 0–0.2)
BASOPHILS NFR BLD AUTO: 0.8 % (ref 0–1.5)
BILIRUB SERPL-MCNC: 0.8 MG/DL (ref 0–1.2)
BUN SERPL-MCNC: 12 MG/DL (ref 8–23)
BUN/CREAT SERPL: 15.4 (ref 7–25)
CALCIUM SERPL-MCNC: 8.8 MG/DL (ref 8.6–10.5)
CHLORIDE SERPL-SCNC: 107 MMOL/L (ref 98–107)
CHOLEST SERPL-MCNC: 135 MG/DL (ref 0–200)
CO2 SERPL-SCNC: 22.9 MMOL/L (ref 22–29)
CREAT SERPL-MCNC: 0.78 MG/DL (ref 0.57–1)
EGFRCR SERPLBLD CKD-EPI 2021: 82.9 ML/MIN/1.73
EOSINOPHIL # BLD AUTO: 0.24 10*3/MM3 (ref 0–0.4)
EOSINOPHIL NFR BLD AUTO: 4.1 % (ref 0.3–6.2)
ERYTHROCYTE [DISTWIDTH] IN BLOOD BY AUTOMATED COUNT: 12.4 % (ref 12.3–15.4)
GLOBULIN SER CALC-MCNC: 2.7 GM/DL
GLUCOSE SERPL-MCNC: 102 MG/DL (ref 65–99)
HBA1C MFR BLD: 6.1 % (ref 4.8–5.6)
HCT VFR BLD AUTO: 36.7 % (ref 34–46.6)
HDLC SERPL-MCNC: 57 MG/DL (ref 40–60)
HGB BLD-MCNC: 12.2 G/DL (ref 12–15.9)
IMM GRANULOCYTES # BLD AUTO: 0.02 10*3/MM3 (ref 0–0.05)
IMM GRANULOCYTES NFR BLD AUTO: 0.3 % (ref 0–0.5)
LDLC SERPL CALC-MCNC: 62 MG/DL (ref 0–100)
LYMPHOCYTES # BLD AUTO: 1.4 10*3/MM3 (ref 0.7–3.1)
LYMPHOCYTES NFR BLD AUTO: 23.6 % (ref 19.6–45.3)
MCH RBC QN AUTO: 31.3 PG (ref 26.6–33)
MCHC RBC AUTO-ENTMCNC: 33.2 G/DL (ref 31.5–35.7)
MCV RBC AUTO: 94.1 FL (ref 79–97)
MONOCYTES # BLD AUTO: 0.5 10*3/MM3 (ref 0.1–0.9)
MONOCYTES NFR BLD AUTO: 8.4 % (ref 5–12)
NEUTROPHILS # BLD AUTO: 3.71 10*3/MM3 (ref 1.7–7)
NEUTROPHILS NFR BLD AUTO: 62.8 % (ref 42.7–76)
NRBC BLD AUTO-RTO: 0 /100 WBC (ref 0–0.2)
PLATELET # BLD AUTO: 289 10*3/MM3 (ref 140–450)
POTASSIUM SERPL-SCNC: 4.2 MMOL/L (ref 3.5–5.2)
PROT SERPL-MCNC: 6.8 G/DL (ref 6–8.5)
RBC # BLD AUTO: 3.9 10*6/MM3 (ref 3.77–5.28)
SODIUM SERPL-SCNC: 141 MMOL/L (ref 136–145)
TRIGL SERPL-MCNC: 81 MG/DL (ref 0–150)
VLDLC SERPL CALC-MCNC: 16 MG/DL (ref 5–40)
WBC # BLD AUTO: 5.92 10*3/MM3 (ref 3.4–10.8)

## 2022-06-15 PROCEDURE — 99214 OFFICE O/P EST MOD 30 MIN: CPT | Performed by: INTERNAL MEDICINE

## 2022-06-16 ENCOUNTER — HOSPITAL ENCOUNTER (OUTPATIENT)
Dept: GENERAL RADIOLOGY | Facility: HOSPITAL | Age: 67
Discharge: HOME | End: 2022-06-16
Admitting: INTERNAL MEDICINE

## 2022-06-16 ENCOUNTER — OFFICE VISIT (OUTPATIENT)
Dept: INTERNAL MEDICINE | Facility: CLINIC | Age: 67
End: 2022-06-16

## 2022-06-16 VITALS
WEIGHT: 269 LBS | OXYGEN SATURATION: 97 % | DIASTOLIC BLOOD PRESSURE: 83 MMHG | HEART RATE: 63 BPM | SYSTOLIC BLOOD PRESSURE: 142 MMHG | BODY MASS INDEX: 42.22 KG/M2 | RESPIRATION RATE: 16 BRPM | HEIGHT: 67 IN | TEMPERATURE: 96.8 F

## 2022-06-16 DIAGNOSIS — M25.562 PAIN IN JOINT OF LEFT KNEE: ICD-10-CM

## 2022-06-16 DIAGNOSIS — F33.0 MILD EPISODE OF RECURRENT MAJOR DEPRESSIVE DISORDER: ICD-10-CM

## 2022-06-16 DIAGNOSIS — E78.2 MIXED HYPERLIPIDEMIA: ICD-10-CM
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
## 2022-06-16 DIAGNOSIS — R73.01 IMPAIRED FASTING GLUCOSE: ICD-10-CM

## 2022-06-16 DIAGNOSIS — I10 BENIGN ESSENTIAL HYPERTENSION: Primary | ICD-10-CM

## 2022-06-16 DIAGNOSIS — I48.20 CHRONIC ATRIAL FIBRILLATION: ICD-10-CM

## 2022-06-16 PROCEDURE — 99214 OFFICE O/P EST MOD 30 MIN: CPT | Performed by: INTERNAL MEDICINE

## 2022-06-16 PROCEDURE — 73560 X-RAY EXAM OF KNEE 1 OR 2: CPT

## 2022-06-16 RX ORDER — ATORVASTATIN CALCIUM 20 MG/1
20 TABLET, FILM COATED ORAL NIGHTLY
Qty: 90 TABLET | Refills: 3 | Status: SHIPPED | OUTPATIENT
Start: 2022-06-16 | End: 2023-01-30

## 2022-06-16 RX ORDER — BUPROPION HYDROCHLORIDE 300 MG/1
300 TABLET ORAL DAILY
Qty: 90 TABLET | Refills: 3 | Status: SHIPPED | OUTPATIENT
Start: 2022-06-16 | End: 2023-01-30

## 2022-06-16 RX ORDER — DILTIAZEM HYDROCHLORIDE 180 MG/1
180 CAPSULE, COATED, EXTENDED RELEASE ORAL
Qty: 90 CAPSULE | Refills: 3 | Status: SHIPPED | OUTPATIENT
Start: 2022-06-16

## 2022-06-16 RX ORDER — METOPROLOL SUCCINATE 25 MG/1
25 TABLET, EXTENDED RELEASE ORAL DAILY
Qty: 90 TABLET | Refills: 3 | Status: SHIPPED | OUTPATIENT
Start: 2022-06-16

## 2022-06-16 NOTE — PROGRESS NOTES
"Chief Complaint  Hypertension, Fall (Fell a week  ago on 6-9-2022 on concrete , landing on both knees hitting left knee the hardest. ), Knee Injury, Hyperlipidemia, and Atrial Fibrillation    Subjective        Neetu Wick presents to [facility]  History of Present Illness  HPI: Patient is here to follow up on the blood pressure  The patient is taking the blood pressure medications as prescribed and has had no side effects. The patient is also here to follow up on the cholesterol and is trying to follow a diet. The patient is  also here to follow up on sugar and had  lab work done .  The patient also is here to follow-up on atrial fibrillation and was seen by cardiology, she also complains of depression and needs refills on medications .  Patient also complains of falling on concrete last week and landing on her knees and hit her left knee which is now swollen and painful and she also has pain radiating into her left hip   Hyperlipidemia   Pertinent negatives include no chest pain or shortness of breath.   Hypertension   Pertinent negatives include no chest pain, palpitations or shortness of breath.    Objective   Vital Signs:  /83   Pulse 63   Temp 96.8 °F (36 °C)   Resp 16   Ht 170.2 cm (67.01\")   Wt 122 kg (269 lb)   SpO2 97%   BMI 42.12 kg/m²   Estimated body mass index is 42.12 kg/m² as calculated from the following:    Height as of this encounter: 170.2 cm (67.01\").    Weight as of this encounter: 122 kg (269 lb).    Class 3 Severe Obesity (BMI >=40). Obesity-related health conditions include the following: hypertension and dyslipidemias. Obesity is improving with lifestyle modifications. BMI is is above average; BMI management plan is completed. We discussed low calorie, low carb based diet program, portion control, increasing exercise and joining a fitness center or start home based exercise program.      Physical Exam  Vitals and nursing note reviewed. "   Constitutional:       General: She is not in acute distress.     Appearance: Normal appearance. She is not diaphoretic.   HENT:      Head: Normocephalic and atraumatic.      Right Ear: External ear normal.      Left Ear: External ear normal.      Nose: Nose normal.   Eyes:      Extraocular Movements: Extraocular movements intact.      Conjunctiva/sclera: Conjunctivae normal.   Neck:      Trachea: Trachea normal.   Cardiovascular:      Rate and Rhythm: Normal rate and regular rhythm.      Heart sounds: Normal heart sounds.   Pulmonary:      Effort: Pulmonary effort is normal. No respiratory distress.      Breath sounds: Normal breath sounds.   Abdominal:      General: Abdomen is flat.   Musculoskeletal:         General: Swelling, tenderness, deformity and signs of injury present.      Cervical back: Neck supple.      Comments: Moves all limbs  Left knee     Skin:     General: Skin is warm.   Neurological:      Mental Status: She is alert and oriented to person, place, and time.      Comments: No gross motor or sensory deficits        Result Review :    Common labs    Common Labsle 9/3/21 9/3/21 9/3/21 9/3/21 3/23/22 3/23/22 3/23/22 3/23/22 3/23/22 6/14/22 6/14/22 6/14/22 6/14/22    1101 1101 1101 1101 1057 1057 1057 1057 1057 1109 1109 1109 1109   Glucose  105 (A)    110 (A)     102 (A)     BUN  17    14     12     Creatinine  0.76    0.88     0.78     eGFR Non African Am  76              eGFR  Am  92              Sodium  139    141     141     Potassium  4.1    4.3     4.2     Chloride  106    105     107     Calcium  9.0    9.0     8.8     Total Protein  6.1    6.5     6.8     Albumin  4.10    4.00     4.10     Total Bilirubin  0.8    0.8     0.8     Alkaline Phosphatase  108    114     109     AST (SGOT)  17    13     14     ALT (SGPT)  14    10     11     WBC 6.18    5.08     5.92      Hemoglobin 12.5    12.9     12.2      Hematocrit 38.2    38.6     36.7      Platelets 272    284     289      Total  Cholesterol   140    164     135    Triglycerides   72    115     81    HDL Cholesterol   57    60     57    LDL Cholesterol    69    84     62    Hemoglobin A1C    6.00 (A)    6.20 (A)     6.10 (A)   Microalbumin, Urine         23.0       (A) Abnormal value       Comments are available for some flowsheets but are not being displayed.                     Assessment and Plan   Diagnoses and all orders for this visit:    1. Benign essential hypertension (Primary)  -     metoprolol succinate XL (TOPROL-XL) 25 MG 24 hr tablet; Take 1 tablet by mouth Daily.  Dispense: 90 tablet; Refill: 3    2. Mixed hyperlipidemia  -     atorvastatin (LIPITOR) 20 MG tablet; Take 1 tablet by mouth Every Night.  Dispense: 90 tablet; Refill: 3  -     CBC & Differential  -     Comprehensive Metabolic Panel  -     Lipid Panel    3. Impaired fasting glucose  -     Hemoglobin A1c    4. Mild episode of recurrent major depressive disorder (HCC)  -     buPROPion XL (WELLBUTRIN XL) 300 MG 24 hr tablet; Take 1 tablet by mouth Daily.  Dispense: 90 tablet; Refill: 3    5. Chronic atrial fibrillation (HCC)  -     dilTIAZem CD (dilTIAZem CD) 180 MG 24 hr capsule; Take 1 capsule by mouth every night at bedtime.  Dispense: 90 capsule; Refill: 3  -     rivaroxaban (XARELTO) 20 MG tablet; Take 1 tablet by mouth Every Night.  Dispense: 90 tablet; Refill: 3    6. Pain in joint of left knee  -     XR Knee 1 or 2 View Left  -     Ambulatory Referral to Orthopedic Surgery    7. Fall, initial encounter    Plan:  1.  Benign essential hypertension: Will continue current medication, low-sodium diet advised, Counseled to regularly check BP at home with goal averaging <130/80.   2.mixed hyperlipidemia:  reviewed  fasting CMP and lipid panel.  Diet and exercise counseled,  Will continue current medications  3. impaired glucose   :   reviewed  fasting CMP  and hba1c 6.1  , diet and exercise counseled  4. Atrial fibrillation: rate controlled, on anticoagulation ,  will  continue current medications , per cardiology , labs reviewed  5.  Left knee joint pain: We will obtain x-ray and refer patient to orthopedist  6.  Major depression: Refill Wellbutrin  7.Fall: Fall precautions advised     I spent 30 minutes caring for Neetu on this date of service. This time includes time spent by me in the following activities:preparing for the visit, reviewing tests, performing a medically appropriate examination and/or evaluation , counseling and educating the patient/family/caregiver, ordering medications, tests, or procedures and documenting information in the medical record  Follow Up   Return in about 16 weeks (around 10/6/2022).  Patient was given instructions and counseling regarding her condition or for health maintenance advice. Please see specific information pulled into the AVS if appropriate.

## 2022-06-22 DIAGNOSIS — M25.562 ARTHRALGIA OF LEFT KNEE: Primary | ICD-10-CM

## 2022-06-23 ENCOUNTER — OFFICE VISIT (OUTPATIENT)
Dept: ORTHOPEDIC SURGERY | Facility: CLINIC | Age: 67
End: 2022-06-23

## 2022-06-23 VITALS — WEIGHT: 269 LBS | TEMPERATURE: 97.8 F | HEIGHT: 67 IN | BODY MASS INDEX: 42.22 KG/M2

## 2022-06-23 DIAGNOSIS — M25.562 ARTHRALGIA OF LEFT KNEE: ICD-10-CM

## 2022-06-23 DIAGNOSIS — M17.12 PRIMARY OSTEOARTHRITIS OF LEFT KNEE: Primary | ICD-10-CM
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
## 2022-06-23 PROCEDURE — 20610 DRAIN/INJ JOINT/BURSA W/O US: CPT | Performed by: PHYSICIAN ASSISTANT

## 2022-06-23 PROCEDURE — 99203 OFFICE O/P NEW LOW 30 MIN: CPT | Performed by: PHYSICIAN ASSISTANT

## 2022-06-23 RX ORDER — LIDOCAINE HYDROCHLORIDE 20 MG/ML
2 INJECTION, SOLUTION INFILTRATION; PERINEURAL
Status: COMPLETED | OUTPATIENT
Start: 2022-06-23 | End: 2022-06-23

## 2022-06-23 RX ORDER — METHYLPREDNISOLONE ACETATE 40 MG/ML
40 INJECTION, SUSPENSION INTRA-ARTICULAR; INTRALESIONAL; INTRAMUSCULAR; SOFT TISSUE
Status: COMPLETED | OUTPATIENT
Start: 2022-06-23 | End: 2022-06-23

## 2022-06-23 RX ADMIN — METHYLPREDNISOLONE ACETATE 40 MG: 40 INJECTION, SUSPENSION INTRA-ARTICULAR; INTRALESIONAL; INTRAMUSCULAR; SOFT TISSUE at 16:04

## 2022-06-23 RX ADMIN — LIDOCAINE HYDROCHLORIDE 2 ML: 20 INJECTION, SOLUTION INFILTRATION; PERINEURAL at 16:04

## 2022-06-23 NOTE — PROGRESS NOTES
Subjective   Patient ID: Neetu Wick is a 66 y.o. right hand dominant female  Pain of the Left Knee (States she had a fall around east and again on June 8th, she fell on her knee both times.)         History of Present Illness  Patient presents with left knee pain after a fall in April 2022. She has noticed constant edema to the left knee. Denies mechanical locking of left knee. She had another fall from mud June 8th.        Pain Location: Knee  Pain Orientation: Left     Pain Descriptors: Dull  Pain Frequency: Constant/continuous                    Pain Intervention(s): Home medication (tylenol)  Result of Injury: Yes  Work-Related Injury: No    Past Medical History:   Diagnosis Date   • Afib (HCC)    • Colon polyp 12/03/2021   • Depression    • Dyslipidemia    • Ear piercing    • Fracture     right ankle   • Fracture, radius 11/04/2015    left   • H/O echocardiogram     2016   • History of nuclear stress test     2016   • Hypertension    • Incontinence    • Murmur    • Obesity    • Ovarian failure    • Panic attacks    • Pedal edema    • PONV (postoperative nausea and vomiting)    • Seasonal allergies    • Sleep apnea     Does not wear machine.   • Urinary tract infection    • Varicose vein of leg    • Venous insufficiency    • Vitamin D deficiency         Past Surgical History:   Procedure Laterality Date   • ABDOMINAL HYSTERECTOMY  1987    Total   • BILATERAL SALPINGO OOPHORECTOMY  1998   • BLADDER SUSPENSION  2009   • COLONOSCOPY  08/03/2015   • COLONOSCOPY N/A 12/03/2021    Procedure: COLONOSCOPY with polypectomy x9;  Surgeon: Adelso Plummer MD;  Location: [facility] ENDOSCOPY;  Service: Gastroenterology;  Laterality: N/A;   • ENDOSCOPY     • ENDOVENOUS ABLATION SAPHENOUS VEIN W/ LASER Bilateral 2015   • OOPHORECTOMY Bilateral 1987   • ORIF ANKLE FRACTURE Right    • ORIF WRIST FRACTURE Left 2013    unknown of surgeon   • WISDOM TOOTH EXTRACTION         Family History   Problem Relation Age of Onset    • Hypertension Mother    • Obesity Mother    • Arthritis Mother    • Hypertension Father    • Dementia Father    • Atrial fibrillation Father    • Obesity Sister    • Cancer Maternal Aunt         breast   • Breast cancer Maternal Aunt    • Cancer Maternal Grandmother         breast   • Breast cancer Maternal Grandmother    • Heart disease Sister    • Hypertension Sister    • Stroke Sister    • Thyroid disease Sister    • Colon cancer Neg Hx        Social History     Socioeconomic History   • Marital status:    Tobacco Use   • Smoking status: Former Smoker     Packs/day: 0.00     Years: 0.00     Pack years: 0.00     Types: Cigarettes     Start date:      Quit date: 1981     Years since quittin.5   • Smokeless tobacco: Never Used   • Tobacco comment: only smoked occasionally, not daily   Vaping Use   • Vaping Use: Never used   Substance and Sexual Activity   • Alcohol use: Not Currently     Alcohol/week: 0.0 standard drinks     Comment: 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   • Drug use: Never   • Sexual activity: Not Currently         Current Outpatient Medications:   •  atorvastatin (LIPITOR) 20 MG tablet, Take 1 tablet by mouth Every Night., Disp: 90 tablet, Rfl: 3  •  buPROPion XL (WELLBUTRIN XL) 300 MG 24 hr tablet, Take 1 tablet by mouth Daily., Disp: 90 tablet, Rfl: 3  •  clotrimazole (LOTRIMIN) 1 % cream, Apply  topically to the appropriate area as directed Daily., Disp: , Rfl:   •  dilTIAZem CD (dilTIAZem CD) 180 MG 24 hr capsule, Take 1 capsule by mouth every night at bedtime., Disp: 90 capsule, Rfl: 3  •  estradiol (ESTRACE) 0.5 MG tablet, Take 1 tablet by mouth Daily., Disp: 90 tablet, Rfl: 1  •  Loratadine 10 MG capsule, Take 10 mg by mouth Daily., Disp: , Rfl:   •  metoprolol succinate XL (TOPROL-XL) 25 MG 24 hr tablet, Take 1 tablet by mouth Daily., Disp: 90 tablet, Rfl: 3  •  rivaroxaban (XARELTO) 20 MG tablet, Take 1 tablet by mouth Every Night., Disp: 90 tablet, Rfl: 3    Allergies   Allergen  "Reactions   • Codeine Itching       Review of Systems   Constitutional: Negative for fever.   HENT: Negative for dental problem and voice change.    Eyes: Negative for visual disturbance.   Respiratory: Negative for shortness of breath.    Cardiovascular: Negative for chest pain.   Gastrointestinal: Negative for abdominal pain.   Genitourinary: Negative for dysuria.   Musculoskeletal: Positive for arthralgias (left knee) and joint swelling (left knee). Negative for gait problem.   Skin: Negative for rash.   Neurological: Negative for speech difficulty.   Hematological: Does not bruise/bleed easily.   Psychiatric/Behavioral: Negative for confusion.       I have reviewed the medical and surgical history, family history, social history, medications, and/or allergies, and the review of systems of this report.    Objective   Temp 97.8 °F (36.6 °C)   Ht 170.2 cm (67.01\")   Wt 122 kg (269 lb)   BMI 42.12 kg/m²    Physical Exam  Vitals and nursing note reviewed.   Constitutional:       Appearance: Normal appearance.   Pulmonary:      Effort: Pulmonary effort is normal.   Musculoskeletal:      Left knee: Bony tenderness present. Tenderness present over the medial joint line. Normal meniscus.   Neurological:      Mental Status: She is alert and oriented to person, place, and time.       Ortho Exam   Extremity DVT signs are negative on physical exam with negative Mt sign, no calf pain, no palpable cords and no skin tone change   Neurologic Exam     Mental Status   Oriented to person, place, and time.        Left knee extensor mechanism is intact.          Assessment & Plan   Independent Review of Radiographic Studies:    X-ray of left knee 2 view weightbearing performed in the office independently reviewed for the evaluation of knee pain.  No comparison films available to view.  No acute fracture.  There does appear to be medial joint space narrowing      Large Joint Arthrocentesis: L knee  Date/Time: 6/23/2022 4:04 " PM  Consent given by: patient  Site marked: site marked  Timeout: Immediately prior to procedure a time out was called to verify the correct patient, procedure, equipment, support staff and site/side marked as required   Supporting Documentation  Indications: pain   Procedure Details  Location: knee - L knee  Preparation: Patient was prepped and draped in the usual sterile fashion  Needle size: 22 G  Approach: anterolateral  Medications administered: 2 mL lidocaine 2%; 40 mg methylPREDNISolone acetate 40 MG/ML  Patient tolerance: patient tolerated the procedure well with no immediate complications             Diagnoses and all orders for this visit:    1. Primary osteoarthritis of left knee (Primary)    2. Arthralgia of left knee    3. Fall from slip, trip, or stumble, initial encounter    Other orders  -     Large Joint Arthrocentesis: L knee       Orthopedic activities reviewed and patient expressed appreciation  Discussion of orthopedic goals  Risk, benefits, and merits of treatment alternatives reviewed with the patient and questions answered    Recommendations/Plan:  Exercise, medications, injections, other patient advice, and return appointment as noted.  Patient is encouraged to call or return for any issues or concerns.    May use topical nsaid as needed    Patient agreeable to call or return sooner for any concerns.  .    EMR Dragon-transcription disclaimer:  This encounter note is an electronic transcription of spoken language to printed text.  Electronic transcription of spoken language may permit erroneous or at times nonsensical words or phrases to be inadvertently transcribed.  Although I have reviewed the note for such errors, some may still exist

## 2022-06-30 RX ORDER — ESTRADIOL 0.5 MG/1
TABLET ORAL
Qty: 90 TABLET | Refills: 0 | Status: SHIPPED | OUTPATIENT
Start: 2022-06-30 | End: 2022-11-15 | Stop reason: SDUPTHER

## 2022-07-30 DIAGNOSIS — U07.1 COVID-19: ICD-10-CM

## 2022-08-01 RX ORDER — FLUTICASONE PROPIONATE 50 MCG
SPRAY, SUSPENSION (ML) NASAL
Qty: 48 G | OUTPATIENT
Start: 2022-08-01

## 2022-08-17 ENCOUNTER — HOSPITAL ENCOUNTER (OUTPATIENT)
Dept: GENERAL RADIOLOGY | Facility: HOSPITAL | Age: 67
Discharge: HOME | End: 2022-08-17
Admitting: INTERNAL MEDICINE

## 2022-08-17 ENCOUNTER — OFFICE VISIT (OUTPATIENT)
Dept: INTERNAL MEDICINE | Facility: CLINIC | Age: 67
End: 2022-08-17

## 2022-08-17 VITALS
BODY MASS INDEX: 41.91 KG/M2 | DIASTOLIC BLOOD PRESSURE: 70 MMHG | HEART RATE: 66 BPM | TEMPERATURE: 96.6 F | WEIGHT: 267 LBS | SYSTOLIC BLOOD PRESSURE: 128 MMHG | RESPIRATION RATE: 16 BRPM | OXYGEN SATURATION: 95 % | HEIGHT: 67 IN

## 2022-08-17 DIAGNOSIS — I10 BENIGN ESSENTIAL HYPERTENSION: Primary | ICD-10-CM

## 2022-08-17 DIAGNOSIS — H66.003 ACUTE SUPPURATIVE OTITIS MEDIA OF BOTH EARS WITHOUT SPONTANEOUS RUPTURE OF TYMPANIC MEMBRANES, RECURRENCE NOT SPECIFIED: ICD-10-CM

## 2022-08-17 DIAGNOSIS — J20.9 ACUTE BRONCHITIS WITH BRONCHOSPASM: ICD-10-CM

## 2022-08-17 DIAGNOSIS — J22 LOWER RESPIRATORY TRACT INFECTION DUE TO COVID-19 VIRUS: ICD-10-CM

## 2022-08-17 DIAGNOSIS — N39.0 ACUTE URINARY TRACT INFECTION: ICD-10-CM

## 2022-08-17 DIAGNOSIS — J01.01 ACUTE RECURRENT MAXILLARY SINUSITIS: ICD-10-CM

## 2022-08-17 DIAGNOSIS — U07.1 LOWER RESPIRATORY TRACT INFECTION DUE TO COVID-19 VIRUS: ICD-10-CM

## 2022-08-17 LAB
BILIRUB BLD-MCNC: NEGATIVE MG/DL
CLARITY, POC: ABNORMAL
COLOR UR: ABNORMAL
EXPIRATION DATE: ABNORMAL
GLUCOSE UR STRIP-MCNC: NEGATIVE MG/DL
KETONES UR QL: NEGATIVE
LEUKOCYTE EST, POC: ABNORMAL
Lab: ABNORMAL
NITRITE UR-MCNC: NEGATIVE MG/ML
PH UR: 6 [PH] (ref 5–8)
PROT UR STRIP-MCNC: ABNORMAL MG/DL
RBC # UR STRIP: ABNORMAL /UL
SP GR UR: 1.03 (ref 1–1.03)
UROBILINOGEN UR QL: NORMAL

## 2022-08-17 PROCEDURE — 99214 OFFICE O/P EST MOD 30 MIN: CPT | Performed by: INTERNAL MEDICINE

## 2022-08-17 PROCEDURE — 96372 THER/PROPH/DIAG INJ SC/IM: CPT | Performed by: INTERNAL MEDICINE

## 2022-08-17 PROCEDURE — 81003 URINALYSIS AUTO W/O SCOPE: CPT | Performed by: INTERNAL MEDICINE

## 2022-08-17 PROCEDURE — 71046 X-RAY EXAM CHEST 2 VIEWS: CPT

## 2022-08-17 RX ORDER — METHYLPREDNISOLONE SODIUM SUCCINATE 125 MG/2ML
125 INJECTION, POWDER, LYOPHILIZED, FOR SOLUTION INTRAMUSCULAR; INTRAVENOUS ONCE
Status: COMPLETED | OUTPATIENT
Start: 2022-08-17 | End: 2022-08-17

## 2022-08-17 RX ORDER — CEFDINIR 300 MG/1
300 CAPSULE ORAL 2 TIMES DAILY
Qty: 20 CAPSULE | Refills: 0 | Status: SHIPPED | OUTPATIENT
Start: 2022-08-17 | End: 2022-08-27

## 2022-08-17 RX ADMIN — METHYLPREDNISOLONE SODIUM SUCCINATE 125 MG: 125 INJECTION, POWDER, LYOPHILIZED, FOR SOLUTION INTRAMUSCULAR; INTRAVENOUS at 10:39

## 2022-08-17 NOTE — PROGRESS NOTES
"Chief Complaint  Cough, URI (Post covid), Hypertension, Nasal Congestion, and Earache    Subjective        Neetu Wick presents to [facility]  History of Present Illness  HPI: Patient is here to follow up on the blood pressure  The patient is taking the blood pressure medications as prescribed and has had no side effects. The patient also complains of ear pain , nasal discharge , sore throat  And sinus congestion since the past few days, patient has been trying over the counter medications with not much relief in the symptoms , patient also complains of cough with yellow sputum . She tested positive for covid 7/30/2022 and was seen in the urgent care and received -  molnupiravir  And tested negative yesterday ,     Objective   Vital Signs:  /70   Pulse 66   Temp 96.6 °F (35.9 °C)   Resp 16   Ht 170.2 cm (67.01\")   Wt 121 kg (267 lb)   SpO2 95%   BMI 41.81 kg/m²   Estimated body mass index is 41.81 kg/m² as calculated from the following:    Height as of this encounter: 170.2 cm (67.01\").    Weight as of this encounter: 121 kg (267 lb).       Physical Exam  Vitals and nursing note reviewed.   Constitutional:       General: She is not in acute distress.     Appearance: Normal appearance. She is not diaphoretic.   HENT:      Head: Normocephalic and atraumatic.      Right Ear: External ear normal. Tympanic membrane is erythematous.      Left Ear: External ear normal. Tympanic membrane is erythematous.      Nose: Rhinorrhea present.      Right Sinus: Maxillary sinus tenderness present.      Left Sinus: Maxillary sinus tenderness present.   Eyes:      Extraocular Movements: Extraocular movements intact.      Conjunctiva/sclera: Conjunctivae normal.   Neck:      Trachea: Trachea normal.   Cardiovascular:      Rate and Rhythm: Normal rate and regular rhythm.      Heart sounds: Normal heart sounds.   Pulmonary:      Effort: Pulmonary effort is normal. No respiratory distress.     "  Breath sounds: Wheezing present.   Abdominal:      General: Abdomen is flat.   Musculoskeletal:      Cervical back: Neck supple.      Comments: Moves all limbs   Skin:     General: Skin is warm.   Neurological:      Mental Status: She is alert and oriented to person, place, and time.      Comments: No gross motor or sensory deficits        Result Review :                Assessment and Plan   Diagnoses and all orders for this visit:    1. Benign essential hypertension (Primary)    2. Acute bronchitis with bronchospasm  -     XR Chest PA & Lateral  -     cefdinir (OMNICEF) 300 MG capsule; Take 1 capsule by mouth 2 (Two) Times a Day for 10 days.  Dispense: 20 capsule; Refill: 0    3. Lower respiratory tract infection due to COVID-19 virus  -     methylPREDNISolone sodium succinate (SOLU-Medrol) injection 125 mg    4. Acute recurrent maxillary sinusitis  -     cefdinir (OMNICEF) 300 MG capsule; Take 1 capsule by mouth 2 (Two) Times a Day for 10 days.  Dispense: 20 capsule; Refill: 0    5. Acute suppurative otitis media of both ears without spontaneous rupture of tympanic membranes, recurrence not specified  -     cefdinir (OMNICEF) 300 MG capsule; Take 1 capsule by mouth 2 (Two) Times a Day for 10 days.  Dispense: 20 capsule; Refill: 0    6. Acute urinary tract infection  -     cefdinir (OMNICEF) 300 MG capsule; Take 1 capsule by mouth 2 (Two) Times a Day for 10 days.  Dispense: 20 capsule; Refill: 0  -     Urine Culture - Urine, Urine, Clean Catch  -     POCT urinalysis dipstick, automated     Plan :   1.Benign Essential Hypertension: will  continue current medications, low sodium diet, patient to continue to monitor  blood pressure  2 .acute bronchitis: We will start oral antibiotics , IM solumedrol given today , will obtain chest x-ray  3.Acute suppurative otitis media: will   start oral antibiotics    4.Acute maxillary sinusitis:   will   start oral antibiotics   5.  Lower respiratory tract infection due to  COVID-19: IM Solu-Medrol given in office today, will obtain chest x-ray   6 acute UTI:.  In office UA done, oral hydration, will start oral antibiotics, will send out urine culture         I spent 30 minutes caring for Neetu on this date of service. This time includes time spent by me in the following activities:preparing for the visit, reviewing tests, performing a medically appropriate examination and/or evaluation , counseling and educating the patient/family/caregiver, ordering medications, tests, or procedures and documenting information in the medical record  Follow Up   Return in about 2 weeks (around 8/31/2022).  Patient was given instructions and counseling regarding her condition or for health maintenance advice. Please see specific information pulled into the AVS if appropriate.

## 2022-08-21 LAB
BACTERIA UR CULT: ABNORMAL
OTHER ANTIBIOTIC SUSC ISLT: ABNORMAL

## 2022-08-31 ENCOUNTER — OFFICE VISIT (OUTPATIENT)
Dept: INTERNAL MEDICINE | Facility: CLINIC | Age: 67
End: 2022-08-31

## 2022-08-31 VITALS
BODY MASS INDEX: 41.59 KG/M2 | DIASTOLIC BLOOD PRESSURE: 84 MMHG | WEIGHT: 265 LBS | TEMPERATURE: 98.2 F | RESPIRATION RATE: 16 BRPM | OXYGEN SATURATION: 97 % | HEIGHT: 67 IN | HEART RATE: 78 BPM | SYSTOLIC BLOOD PRESSURE: 147 MMHG

## 2022-08-31 DIAGNOSIS — H66.003 ACUTE SUPPURATIVE OTITIS MEDIA OF BOTH EARS WITHOUT SPONTANEOUS RUPTURE OF TYMPANIC MEMBRANES, RECURRENCE NOT SPECIFIED: ICD-10-CM

## 2022-08-31 DIAGNOSIS — J01.01 ACUTE RECURRENT MAXILLARY SINUSITIS: ICD-10-CM

## 2022-08-31 DIAGNOSIS — J20.8 ACUTE BRONCHITIS DUE TO OTHER SPECIFIED ORGANISMS: ICD-10-CM

## 2022-08-31 DIAGNOSIS — I10 BENIGN ESSENTIAL HYPERTENSION: Primary | ICD-10-CM

## 2022-08-31 PROCEDURE — 99214 OFFICE O/P EST MOD 30 MIN: CPT | Performed by: INTERNAL MEDICINE

## 2022-08-31 RX ORDER — AZITHROMYCIN 250 MG/1
TABLET, FILM COATED ORAL
Qty: 6 TABLET | Refills: 0 | Status: SHIPPED | OUTPATIENT
Start: 2022-08-31 | End: 2022-09-13

## 2022-08-31 NOTE — PROGRESS NOTES
"Chief Complaint  Hypertension, Cough, Ear Fullness, Sinus Problem, and Nasal Congestion    Subjective        Neetu iWck presents to [facility]  History of Present Illness  HPI: Patient is here to follow up on the blood pressure  The patient is taking the blood pressure medications as prescribed and has had no side effects. The patient also complains of ear pain , nasal discharge ,    And sinus congestion since the past few days, patient has been trying over the counter medications with not much relief in the symptoms , patient also complains of cough with yellow sputum  .  She had COVID a few weeks ago and had a chest x-ray done which has been reviewed with her today    Objective   Vital Signs:  /84   Pulse 78   Temp 98.2 °F (36.8 °C)   Resp 16   Ht 170.2 cm (67.01\")   Wt 120 kg (265 lb)   SpO2 97%   BMI 41.49 kg/m²   Estimated body mass index is 41.49 kg/m² as calculated from the following:    Height as of this encounter: 170.2 cm (67.01\").    Weight as of this encounter: 120 kg (265 lb).           Physical Exam  Vitals and nursing note reviewed.   Constitutional:       General: She is not in acute distress.     Appearance: Normal appearance. She is not diaphoretic.   HENT:      Head: Normocephalic and atraumatic.      Right Ear: External ear normal. Tympanic membrane is erythematous.      Left Ear: External ear normal. Tympanic membrane is erythematous.      Nose: Rhinorrhea present.      Right Sinus: Maxillary sinus tenderness present.      Left Sinus: Maxillary sinus tenderness present.   Eyes:      Extraocular Movements: Extraocular movements intact.      Conjunctiva/sclera: Conjunctivae normal.   Neck:      Trachea: Trachea normal.   Cardiovascular:      Rate and Rhythm: Normal rate and regular rhythm.      Heart sounds: Normal heart sounds.   Pulmonary:      Effort: Pulmonary effort is normal. No respiratory distress.      Breath sounds: Normal breath sounds. "   Abdominal:      General: Abdomen is flat.   Musculoskeletal:      Cervical back: Neck supple.      Comments: Moves all limbs   Skin:     General: Skin is warm.   Neurological:      Mental Status: She is alert and oriented to person, place, and time.      Comments: No gross motor or sensory deficits        Result Review :                Assessment and Plan   Diagnoses and all orders for this visit:    1. Benign essential hypertension (Primary)    2. Acute bronchitis due to other specified organisms  -     azithromycin (Zithromax Z-Davis) 250 MG tablet; Take 2 tablets the first day, then 1 tablet daily for 4 days.  Dispense: 6 tablet; Refill: 0    3. Acute recurrent maxillary sinusitis  -     azithromycin (Zithromax Z-Davis) 250 MG tablet; Take 2 tablets the first day, then 1 tablet daily for 4 days.  Dispense: 6 tablet; Refill: 0    4. Acute suppurative otitis media of both ears without spontaneous rupture of tympanic membranes, recurrence not specified  -     azithromycin (Zithromax Z-Davis) 250 MG tablet; Take 2 tablets the first day, then 1 tablet daily for 4 days.  Dispense: 6 tablet; Refill: 0       Plan :   1.Benign Essential Hypertension: will  continue current medications, low sodium diet, patient to continue to monitor  blood pressure  2 .acute bronchitis: We will start oral antibiotics, chest x-ray reviewed  3.Acute suppurative otitis media: will   start oral antibiotics    4.Acute maxillary sinusitis:   will   start oral antibiotics    I spent 30  minutes caring for Neetu on this date of service. This time includes time spent by me in the following activities:preparing for the visit, reviewing tests, performing a medically appropriate examination and/or evaluation , counseling and educating the patient/family/caregiver, ordering medications, tests, or procedures and documenting information in the medical record          Follow Up    Patient was given instructions and counseling regarding her condition or for  health maintenance advice. Please see specific information pulled into the AVS if appropriate.

## 2022-09-11 ENCOUNTER — HEALTH MAINTENANCE LETTER (OUTPATIENT)
Age: 67
End: 2022-09-11

## 2022-09-13 ENCOUNTER — OFFICE VISIT (OUTPATIENT)
Dept: INTERNAL MEDICINE | Facility: CLINIC | Age: 67
End: 2022-09-13

## 2022-09-13 VITALS
TEMPERATURE: 97.3 F | SYSTOLIC BLOOD PRESSURE: 138 MMHG | HEART RATE: 64 BPM | RESPIRATION RATE: 16 BRPM | DIASTOLIC BLOOD PRESSURE: 78 MMHG | HEIGHT: 67 IN | OXYGEN SATURATION: 97 % | WEIGHT: 264 LBS | BODY MASS INDEX: 41.44 KG/M2

## 2022-09-13 DIAGNOSIS — E78.2 MIXED HYPERLIPIDEMIA: ICD-10-CM

## 2022-09-13 DIAGNOSIS — Z00.00 ROUTINE GENERAL MEDICAL EXAMINATION AT A HEALTH CARE FACILITY: ICD-10-CM

## 2022-09-13 DIAGNOSIS — Z00.00 MEDICARE ANNUAL WELLNESS VISIT, SUBSEQUENT: Primary | ICD-10-CM

## 2022-09-13 DIAGNOSIS — Z91.81 AT HIGH RISK FOR FALLS: ICD-10-CM

## 2022-09-13 DIAGNOSIS — R73.01 IMPAIRED FASTING GLUCOSE: ICD-10-CM

## 2022-09-13 DIAGNOSIS — I10 BENIGN ESSENTIAL HYPERTENSION: ICD-10-CM

## 2022-09-13 PROCEDURE — 96160 PT-FOCUSED HLTH RISK ASSMT: CPT | Performed by: INTERNAL MEDICINE

## 2022-09-13 PROCEDURE — 99397 PER PM REEVAL EST PAT 65+ YR: CPT | Performed by: INTERNAL MEDICINE

## 2022-09-13 PROCEDURE — G0439 PPPS, SUBSEQ VISIT: HCPCS | Performed by: INTERNAL MEDICINE

## 2022-09-13 PROCEDURE — 1170F FXNL STATUS ASSESSED: CPT | Performed by: INTERNAL MEDICINE

## 2022-09-13 PROCEDURE — 1159F MED LIST DOCD IN RCRD: CPT | Performed by: INTERNAL MEDICINE

## 2022-09-13 NOTE — PATIENT INSTRUCTIONS
Advance Care Planning and Advance Directives     You make decisions on a daily basis - decisions about where you want to live, your career, your home, your life. Perhaps one of the most important decisions you face is your choice for future medical care. Take time to talk with your family and your healthcare team and start planning today.  Advance Care Planning is a process that can help you:  · Understand possible future healthcare decisions in light of your own experiences  · Reflect on those decision in light of your goals and values  · Discuss your decisions with those closest to you and the healthcare professionals that care for you  · Make a plan by creating a document that reflects your wishes    Surrogate Decision Maker  In the event of a medical emergency, which has left you unable to communicate or to make your own decisions, you would need someone to make decisions for you.  It is important to discuss your preferences for medical treatment with this person while you are in good health.     Qualities of a surrogate decision maker:  • Willing to take on this role and responsibility  • Knows what you want for future medical care  • Willing to follow your wishes even if they don't agree with them  • Able to make difficult medical decisions under stressful circumstances    Advance Directives  These are legal documents you can create that will guide your healthcare team and decision maker(s) when needed. These documents can be stored in the electronic medical record.    · Living Will - a legal document to guide your care if you have a terminal condition or a serious illness and are unable to communicate. States vary by statute in document names/types, but most forms may include one or more of the following:        -  Directions regarding life-prolonging treatments        -  Directions regarding artificially provided nutrition/hydration        -  Choosing a healthcare decision maker        -  Direction  regarding organ/tissue donation    · Durable Power of  for Healthcare - this document names an -in-fact to make medical decisions for you, but it may also allow this person to make personal and financial decisions for you. Please seek the advice of an  if you need this type of document.    **Advance Directives are not required and no one may discriminate against you if you do not sign one.    Medical Orders  Many states allow specific forms/orders signed by your physician to record your wishes for medical treatment in your current state of health. This form, signed in personal communication with your physician, addresses resuscitation and other medical interventions that you may or may not want.      For more information or to schedule a time with a [facility] Advance Care Planning Facilitator contact: [facility] or call [phone] and someone will contact you directly.    Fall Prevention in the Home, Adult  Falls can cause injuries and can affect people from all age groups. There are many simple things that you can do to make your home safe and to help prevent falls. Ask for help when making these changes, if needed.  What actions can I take to prevent falls?  General instructions  · Use good lighting in all rooms. Replace any light bulbs that burn out.  · Turn on lights if it is dark. Use night-lights.  · Place frequently used items in easy-to-reach places. Lower the shelves around your home if necessary.  · Set up furniture so that there are clear paths around it. Avoid moving your furniture around.  · Remove throw rugs and other tripping hazards from the floor.  · Avoid walking on wet floors.  · Fix any uneven floor surfaces.  · Add color or contrast paint or tape to grab bars and handrails in your home. Place contrasting color strips on the first and last steps of stairways.  · When you use a stepladder, make sure that it is completely opened and that the sides are  firmly locked. Have someone hold the ladder while you are using it. Do not climb a closed stepladder.  · Be aware of any and all pets.  What can I do in the bathroom?         · Keep the floor dry. Immediately clean up any water that spills onto the floor.  · Remove soap buildup in the tub or shower on a regular basis.  · Use non-skid mats or decals on the floor of the tub or shower.  · Attach bath mats securely with double-sided, non-slip rug tape.  · If you need to sit down while you are in the shower, use a plastic, non-slip stool.  · Install grab bars by the toilet and in the tub and shower. Do not use towel bars as grab bars.  What can I do in the bedroom?  · Make sure that a bedside light is easy to reach.  · Do not use oversized bedding that drapes onto the floor.  · Have a firm chair that has side arms to use for getting dressed.  What can I do in the kitchen?  · Clean up any spills right away.  · If you need to reach for something above you, use a sturdy step stool that has a grab bar.  · Keep electrical cables out of the way.  · Do not use floor polish or wax that makes floors slippery. If you must use wax, make sure that it is non-skid floor wax.  What can I do in the stairways?  · Do not leave any items on the stairs.  · Make sure that you have a light switch at the top of the stairs and the bottom of the stairs. Have them installed if you do not have them.  · Make sure that there are handrails on both sides of the stairs. Fix handrails that are broken or loose. Make sure that handrails are as long as the stairways.  · Install non-slip stair treads on all stairs in your home.  · Avoid having throw rugs at the top or bottom of stairways, or secure the rugs with carpet tape to prevent them from moving.  · Choose a carpet design that does not hide the edge of steps on the stairway.  · Check any carpeting to make sure that it is firmly attached to the stairs. Fix any carpet that is loose or worn.  What can I  do on the outside of my home?  · Use bright outdoor lighting.  · Regularly repair the edges of walkways and driveways and fix any cracks.  · Remove high doorway thresholds.  · Trim any shrubbery on the main path into your home.  · Regularly check that handrails are securely fastened and in good repair. Both sides of any steps should have handrails.  · Install guardrails along the edges of any raised decks or porches.  · Clear walkways of debris and clutter, including tools and rocks.  · Have leaves, snow, and ice cleared regularly.  · Use sand or salt on walkways during winter months.  · In the garage, clean up any spills right away, including grease or oil spills.  What other actions can I take?  · Wear closed-toe shoes that fit well and support your feet. Wear shoes that have rubber soles or low heels.  · Use mobility aids as needed, such as canes, walkers, scooters, and crutches.  · Review your medicines with your health care provider. Some medicines can cause dizziness or changes in blood pressure, which increase your risk of falling.  Talk with your health care provider about other ways that you can decrease your risk of falls. This may include working with a physical therapist or  to improve your strength, balance, and endurance.  Where to find more information  · Centers for Disease Control and Prevention, [place]: https://www.cdc.gov  · National [place] on Aging: https://gr8rjvu.sharla.nih.gov  Contact a health care provider if:  · You are afraid of falling at home.  · You feel weak, drowsy, or dizzy at home.  · You fall at home.  Summary  · There are many simple things that you can do to make your home safe and to help prevent falls.  · Ways to make your home safe include removing tripping hazards and installing grab bars in the bathroom.  · Ask for help when making these changes in your home.  This information is not intended to replace advice given to you by your health care provider. Make sure you  discuss any questions you have with your health care provider.  Document Revised: 11/30/2018 Document Reviewed: 08/02/2018  Elsevier Patient Education © 2021 Elsevier Inc.      Sit-to-Stand Exercise    The sit-to-stand exercise (also known as the chair stand or chair rise exercise) strengthens your lower body and helps you maintain or improve your mobility and independence. The goal is to do the sit-to-stand exercise without using your hands. This will be easier as you become stronger. You should always talk with your health care provider before starting any exercise program, especially if you have had recent surgery.  Do the exercise exactly as told by your health care provider and adjust it as directed. It is normal to feel mild stretching, pulling, tightness, or discomfort as you do this exercise, but you should stop right away if you feel sudden pain or your pain gets worse. Do not begin doing this exercise until told by your health care provider.  What the sit-to-stand exercise does  The sit-to-stand exercise helps to strengthen the muscles in your thighs and the muscles in the center of your body that give you stability (core muscles). This exercise is especially helpful if:  · You have had knee or hip surgery.  · You have trouble getting up from a chair, out of a car, or off the toilet.  How to do the sit-to-stand exercise  1. Sit toward the front edge of a sturdy chair without armrests. Your knees should be bent and your feet should be flat on the floor and shoulder-width apart.  2. Place your hands lightly on each side of the seat. Keep your back and neck as straight as possible, with your chest slightly forward.  3. Breathe in slowly. Lean forward and slightly shift your weight to the front of your feet.  4. Breathe out as you slowly stand up. Use your hands as little as possible.  5. Stand and pause for a full breath in and out.  6. Breathe in as you sit down slowly. Tighten your core and abdominal  muscles to control your lowering as much as possible.  7. Breathe out slowly.  8. Do this exercise 10-15 times. If needed, do it fewer times until you build up strength.  9. Rest for 1 minute, then do another set of 10-15 repetitions.  To change the difficulty of the sit-to-stand exercise  · If the exercise is too difficult, use a chair with sturdy armrests, and push off the armrests to help you come to the standing position. You can also use the armrests to help slowly lower yourself back to sitting. As this gets easier, try to use your arms less. You can also place a firm cushion or pillow on the chair to make the surface higher.  · If this exercise is too easy, do not use your arms to help raise or lower yourself. You can also wear a weighted vest, use hand weights, increase your repetitions, or try a lower chair.  General tips  · You may feel tired when starting an exercise routine. This is normal.  · You may have muscle soreness that lasts a few days. This is normal. As you get stronger, you may not feel muscle soreness.  · Use smooth, steady movements.  · Do not  hold your breath during strength exercises. This can cause unsafe changes in your blood pressure.  · Breathe in slowly through your nose, and breathe out slowly through your mouth.  Summary  · Strengthening your lower body is an important step to help you move safely and independently.  · The sit-to-stand exercise helps strengthen the muscles in your thighs and core.  · You should always talk with your health care provider before starting any exercise program, especially if you have had recent surgery.  This information is not intended to replace advice given to you by your health care provider. Make sure you discuss any questions you have with your health care provider.  Document Revised: 10/16/2019 Document Reviewed: 02/08/2018  [employer] Patient Education © 2021 [employer]      Medicare Wellness  Personal Prevention Plan of Service     Date of  Office Visit:    Encounter Provider:  Ines Hancock MD  Place of Service:  [facility]  Patient Name: Neetu Wick  :  1955    As part of the Medicare Wellness portion of your visit today, we are providing you with this personalized preventive plan of services (PPPS). This plan is based upon recommendations of the [place] Preventive Services Task Force (USPSTF) and the Advisory Committee on Immunization Practices (ACIP).    This lists the preventive care services that should be considered, and provides dates of when you are due. Items listed as completed are up-to-date and do not require any further intervention.    Health Maintenance   Topic Date Due   • HEPATITIS C SCREENING  Never done   • DIABETIC FOOT EXAM  Never done   • DIABETIC EYE EXAM  Never done   • COVID-19 Vaccine (4 - Booster for Pfizer series) 2022   • INFLUENZA VACCINE  10/01/2022   • HEMOGLOBIN A1C  2022   • URINE MICROALBUMIN  2023   • MAMMOGRAM  2023   • LIPID PANEL  2023   • DXA SCAN  2023   • ANNUAL WELLNESS VISIT  2023   • COLORECTAL CANCER SCREENING  2024   • TDAP/TD VACCINES (3 - Td or Tdap) 08/15/2026   • Pneumococcal Vaccine 65+  Completed   • ZOSTER VACCINE  Completed       No orders of the defined types were placed in this encounter.      Return in about 1 year (around 2023) for Medicare Wellness.

## 2022-10-07 LAB
ALBUMIN SERPL-MCNC: 4.2 G/DL (ref 3.5–5.2)
ALBUMIN/GLOB SERPL: 2.2 G/DL
ALP SERPL-CCNC: 116 U/L (ref 39–117)
ALT SERPL-CCNC: 11 U/L (ref 1–33)
AST SERPL-CCNC: 15 U/L (ref 1–32)
BASOPHILS # BLD AUTO: 0.07 10*3/MM3 (ref 0–0.2)
BASOPHILS NFR BLD AUTO: 1.1 % (ref 0–1.5)
BILIRUB SERPL-MCNC: 0.5 MG/DL (ref 0–1.2)
BUN SERPL-MCNC: 15 MG/DL (ref 8–23)
BUN/CREAT SERPL: 19.7 (ref 7–25)
CALCIUM SERPL-MCNC: 9 MG/DL (ref 8.6–10.5)
CHLORIDE SERPL-SCNC: 108 MMOL/L (ref 98–107)
CHOLEST SERPL-MCNC: 134 MG/DL (ref 0–200)
CO2 SERPL-SCNC: 28.6 MMOL/L (ref 22–29)
CREAT SERPL-MCNC: 0.76 MG/DL (ref 0.57–1)
EGFRCR SERPLBLD CKD-EPI 2021: 84.9 ML/MIN/1.73
EOSINOPHIL # BLD AUTO: 0.33 10*3/MM3 (ref 0–0.4)
EOSINOPHIL NFR BLD AUTO: 5.4 % (ref 0.3–6.2)
ERYTHROCYTE [DISTWIDTH] IN BLOOD BY AUTOMATED COUNT: 12.4 % (ref 12.3–15.4)
GLOBULIN SER CALC-MCNC: 1.9 GM/DL
GLUCOSE SERPL-MCNC: 119 MG/DL (ref 65–99)
HBA1C MFR BLD: 6.3 % (ref 4.8–5.6)
HCT VFR BLD AUTO: 37.1 % (ref 34–46.6)
HDLC SERPL-MCNC: 62 MG/DL (ref 40–60)
HGB BLD-MCNC: 12.3 G/DL (ref 12–15.9)
IMM GRANULOCYTES # BLD AUTO: 0.01 10*3/MM3 (ref 0–0.05)
IMM GRANULOCYTES NFR BLD AUTO: 0.2 % (ref 0–0.5)
LDLC SERPL CALC-MCNC: 58 MG/DL (ref 0–100)
LYMPHOCYTES # BLD AUTO: 1.31 10*3/MM3 (ref 0.7–3.1)
LYMPHOCYTES NFR BLD AUTO: 21.3 % (ref 19.6–45.3)
MCH RBC QN AUTO: 31.5 PG (ref 26.6–33)
MCHC RBC AUTO-ENTMCNC: 33.2 G/DL (ref 31.5–35.7)
MCV RBC AUTO: 94.9 FL (ref 79–97)
MONOCYTES # BLD AUTO: 0.69 10*3/MM3 (ref 0.1–0.9)
MONOCYTES NFR BLD AUTO: 11.2 % (ref 5–12)
NEUTROPHILS # BLD AUTO: 3.74 10*3/MM3 (ref 1.7–7)
NEUTROPHILS NFR BLD AUTO: 60.8 % (ref 42.7–76)
NRBC BLD AUTO-RTO: 0 /100 WBC (ref 0–0.2)
PLATELET # BLD AUTO: 288 10*3/MM3 (ref 140–450)
POTASSIUM SERPL-SCNC: 4.4 MMOL/L (ref 3.5–5.2)
PROT SERPL-MCNC: 6.1 G/DL (ref 6–8.5)
RBC # BLD AUTO: 3.91 10*6/MM3 (ref 3.77–5.28)
SODIUM SERPL-SCNC: 144 MMOL/L (ref 136–145)
TRIGL SERPL-MCNC: 67 MG/DL (ref 0–150)
VLDLC SERPL CALC-MCNC: 14 MG/DL (ref 5–40)
WBC # BLD AUTO: 6.15 10*3/MM3 (ref 3.4–10.8)

## 2022-10-12 ENCOUNTER — OFFICE VISIT (OUTPATIENT)
Dept: INTERNAL MEDICINE | Facility: CLINIC | Age: 67
End: 2022-10-12

## 2022-10-12 VITALS
DIASTOLIC BLOOD PRESSURE: 78 MMHG | TEMPERATURE: 97.3 F | HEIGHT: 67 IN | SYSTOLIC BLOOD PRESSURE: 151 MMHG | RESPIRATION RATE: 16 BRPM | OXYGEN SATURATION: 97 % | HEART RATE: 68 BPM | WEIGHT: 270 LBS | BODY MASS INDEX: 42.38 KG/M2

## 2022-10-12 DIAGNOSIS — Z23 NEED FOR INFLUENZA VACCINATION: ICD-10-CM

## 2022-10-12 DIAGNOSIS — E11.65 TYPE 2 DIABETES MELLITUS WITH HYPERGLYCEMIA, WITHOUT LONG-TERM CURRENT USE OF INSULIN: ICD-10-CM

## 2022-10-12 DIAGNOSIS — I10 BENIGN ESSENTIAL HYPERTENSION: Primary | ICD-10-CM

## 2022-10-12 DIAGNOSIS — E78.2 MIXED HYPERLIPIDEMIA: ICD-10-CM

## 2022-10-12 DIAGNOSIS — J30.1 ALLERGIC RHINITIS DUE TO POLLEN, UNSPECIFIED SEASONALITY: ICD-10-CM

## 2022-10-12 DIAGNOSIS — Z12.31 ENCOUNTER FOR SCREENING MAMMOGRAM FOR MALIGNANT NEOPLASM OF BREAST: ICD-10-CM

## 2022-10-12 PROCEDURE — 90662 IIV NO PRSV INCREASED AG IM: CPT | Performed by: INTERNAL MEDICINE

## 2022-10-12 PROCEDURE — 99214 OFFICE O/P EST MOD 30 MIN: CPT | Performed by: INTERNAL MEDICINE

## 2022-10-12 PROCEDURE — G0008 ADMIN INFLUENZA VIRUS VAC: HCPCS | Performed by: INTERNAL MEDICINE

## 2022-10-12 RX ORDER — HYDROXYZINE HYDROCHLORIDE 10 MG/1
10 TABLET, FILM COATED ORAL NIGHTLY PRN
Qty: 30 TABLET | Refills: 3 | Status: SHIPPED | OUTPATIENT
Start: 2022-10-12 | End: 2023-01-30

## 2022-10-12 NOTE — PROGRESS NOTES
"Chief Complaint  Hypertension and Hyperlipidemia    Subjective        Neetu Wick presents to [facility]  History of Present Illness  HPI: Patient is here to follow up on the blood pressure  The patient is taking the blood pressure medications as prescribed and has had no side effects. The patient is also here to follow up on the cholesterol and is trying to follow a diet. The patient is  also here to follow up on sugar and had lab work done .  The patient also needs refills on medications flu vaccine   And mammogram .  She complains of allergies  Hyperlipidemia   Pertinent negatives include no chest pain or shortness of breath.   Hypertension   Pertinent negatives include no chest pain, palpitations or shortness of breath.    Objective   Vital Signs:  /78   Pulse 68   Temp 97.3 °F (36.3 °C)   Resp 16   Ht 170.2 cm (67.01\")   Wt 122 kg (270 lb)   SpO2 97%   BMI 42.28 kg/m²   Estimated body mass index is 42.28 kg/m² as calculated from the following:    Height as of this encounter: 170.2 cm (67.01\").    Weight as of this encounter: 122 kg (270 lb).    Class 3 Severe Obesity (BMI >=40). Obesity-related health conditions include the following: hypertension and dyslipidemias. Obesity is improving with lifestyle modifications. BMI is is above average; BMI management plan is completed. We discussed low calorie, low carb based diet program, portion control, increasing exercise and joining a fitness center or start home based exercise program.      Physical Exam  Vitals and nursing note reviewed.   Constitutional:       General: She is not in acute distress.     Appearance: Normal appearance. She is not diaphoretic.   HENT:      Head: Normocephalic and atraumatic.      Right Ear: External ear normal.      Left Ear: External ear normal.      Nose: Nose normal.   Eyes:      Extraocular Movements: Extraocular movements intact.      Conjunctiva/sclera: Conjunctivae normal.   Neck: "      Trachea: Trachea normal.   Cardiovascular:      Rate and Rhythm: Normal rate and regular rhythm.      Heart sounds: Normal heart sounds.   Pulmonary:      Effort: Pulmonary effort is normal. No respiratory distress.      Breath sounds: Normal breath sounds.   Abdominal:      General: Abdomen is flat.   Musculoskeletal:      Cervical back: Neck supple.      Comments: Moves all limbs   Skin:     General: Skin is warm.   Neurological:      Mental Status: She is alert and oriented to person, place, and time.      Comments: No gross motor or sensory deficits        Result Review :    Common labs    Common Labs 3/23/22 3/23/22 3/23/22 3/23/22 3/23/22 6/14/22 6/14/22 6/14/22 6/14/22 10/7/22 10/7/22 10/7/22 10/7/22    1057 1057 1057 1057 1057 1109 1109 1109 1109 0959 [id number]   Glucose  110 (A)     102 (A)    119 (A)     BUN  14     12    15     Creatinine  0.88     0.78    0.76     Sodium  141     141    144     Potassium  4.3     4.2    4.4     Chloride  105     107    108 (A)     Calcium  9.0     8.8    9.0     Total Protein  6.5     6.8    6.1     Albumin  4.00     4.10    4.20     Total Bilirubin  0.8     0.8    0.5     Alkaline Phosphatase  114     109    116     AST (SGOT)  13     14    15     ALT (SGPT)  10     11    11     WBC 5.08     5.92    6.15      Hemoglobin 12.9     12.2    12.3      Hematocrit 38.6     36.7    37.1      Platelets 284     289    288      Total Cholesterol   164     135    134    Triglycerides   115     81    67    HDL Cholesterol   60     57    62 (A)    LDL Cholesterol    84     62    58    Hemoglobin A1C    6.20 (A)     6.10 (A)    6.30 (A)   Microalbumin, Urine     23.0           (A) Abnormal value       Comments are available for some flowsheets but are not being displayed.                     Assessment and Plan   Diagnoses and all orders for this visit:    1. Benign essential hypertension (Primary)    2. Mixed hyperlipidemia  -     CBC & Differential  -     Comprehensive  Metabolic Panel  -     Lipid Panel    3. Type 2 diabetes mellitus with hyperglycemia, without long-term current use of insulin (HCC)  -     Hemoglobin A1c    4. Allergic rhinitis due to pollen, unspecified seasonality  -     hydrOXYzine (ATARAX) 10 MG tablet; Take 1 tablet by mouth At Night As Needed for Allergies.  Dispense: 30 tablet; Refill: 3    5. Encounter for screening mammogram for malignant neoplasm of breast  -     Mammo Screening Digital Tomosynthesis Bilateral With CAD    6. Need for influenza vaccination    Plan:  1.  Benign essential hypertension: Will continue current medication, low-sodium diet advised, Counseled to regularly check BP at home with goal averaging <130/80.   2.mixed hyperlipidemia: reviewed   fasting CMP and lipid panel.  Diet and exercise counseled,  Will continue current medications  3. Diabetes  Mellitus  :   Reviewed    fasting CMP  and hba1c 6.3   , diet and exercise counseled ,    4. Allergic rhinitis : trial with hydroxyzine   5. screening for breast cancer : schedule mammogram   6.need for flu vaccine : given today and well tolerated      I spent 30 minutes caring for Neetu on this date of service. This time includes time spent by me in the following activities:preparing for the visit, reviewing tests, performing a medically appropriate examination and/or evaluation , counseling and educating the patient/family/caregiver, ordering medications, tests, or procedures and documenting information in the medical record  Follow Up   Return in about 3 months (around 1/24/2023).  Patient was given instructions and counseling regarding her condition or for health maintenance advice. Please see specific information pulled into the AVS if appropriate.

## 2022-11-14 RX ORDER — ESTRADIOL 0.5 MG/1
TABLET ORAL
Qty: 90 TABLET | Refills: 0 | OUTPATIENT
Start: 2022-11-14

## 2022-11-15 ENCOUNTER — TELEPHONE (OUTPATIENT)
Dept: OBSTETRICS AND GYNECOLOGY | Facility: CLINIC | Age: 67
End: 2022-11-15

## 2022-11-15 RX ORDER — ESTRADIOL 0.5 MG/1
0.5 TABLET ORAL DAILY
Qty: 30 TABLET | Refills: 1 | Status: SHIPPED | OUTPATIENT
Start: 2022-11-15 | End: 2022-12-15

## 2022-11-15 NOTE — TELEPHONE ENCOUNTER
----- Message from Haresh Merino sent at 11/15/2022  2:14 PM EST -----  PATIENT SCHEDULED FOR ANNUAL 01/06/2023. SHE IS REQUESTING REFILLS OF Estrace until appointment.

## 2022-12-13 ENCOUNTER — HOSPITAL ENCOUNTER (OUTPATIENT)
Dept: MAMMOGRAPHY | Facility: HOSPITAL | Age: 67
End: 2022-12-13

## 2022-12-15 RX ORDER — ESTRADIOL 0.5 MG/1
TABLET ORAL
Qty: 90 TABLET | Refills: 1 | Status: SHIPPED | OUTPATIENT
Start: 2022-12-15 | End: 2023-01-06 | Stop reason: SDUPTHER

## 2023-01-06 ENCOUNTER — OFFICE VISIT (OUTPATIENT)
Dept: OBSTETRICS AND GYNECOLOGY | Facility: CLINIC | Age: 68
End: 2023-01-06
Payer: MEDICARE

## 2023-01-06 VITALS
WEIGHT: 266 LBS | BODY MASS INDEX: 41.75 KG/M2 | SYSTOLIC BLOOD PRESSURE: 116 MMHG | HEIGHT: 67 IN | DIASTOLIC BLOOD PRESSURE: 68 MMHG

## 2023-01-06 DIAGNOSIS — Z12.39 ENCOUNTER FOR BREAST CANCER SCREENING OTHER THAN MAMMOGRAM: ICD-10-CM

## 2023-01-06 DIAGNOSIS — L90.0 LICHEN SCLEROSUS: ICD-10-CM

## 2023-01-06 DIAGNOSIS — N39.3 STRESS INCONTINENCE: ICD-10-CM

## 2023-01-06 DIAGNOSIS — Z01.419 ENCOUNTER FOR GYNECOLOGICAL EXAMINATION WITHOUT ABNORMAL FINDING: Primary | ICD-10-CM

## 2023-01-06 PROCEDURE — 99397 PER PM REEVAL EST PAT 65+ YR: CPT | Performed by: MIDWIFE

## 2023-01-06 RX ORDER — ESTRADIOL 0.5 MG/1
0.5 TABLET ORAL DAILY
Qty: 90 TABLET | Refills: 4 | Status: SHIPPED | OUTPATIENT
Start: 2023-01-06 | End: 2023-01-30 | Stop reason: SDUPTHER

## 2023-01-06 RX ORDER — CLOBETASOL PROPIONATE 0.5 MG/G
1 CREAM TOPICAL 2 TIMES DAILY
Qty: 15 G | Refills: 3 | Status: SHIPPED | OUTPATIENT
Start: 2023-01-06

## 2023-01-06 NOTE — PROGRESS NOTES
Subjective   Chief Complaint   Patient presents with   • Gynecologic Exam     No Complaints/concerns      Neetu Wick is a 67 y.o. year old  presenting to be seen for her annual exam.   She has continued to take HRT, Estrace and wants to continue.  She has had external itching on perineum for months.  She wears pantiliners all of the time due to bladder leakage.  She has had anterior repair with possible sling in the past.    Past Medical History:   Diagnosis Date   • Afib (HCC)    • Colon polyp 2021   • Depression    • Dyslipidemia    • Ear piercing    • Fracture     right ankle   • Fracture, radius 2015    left   • H/O echocardiogram     2016   • History of nuclear stress test     2016   • Hypertension    • Incontinence    • Murmur    • Obesity    • Ovarian failure    • Panic attacks    • Pedal edema    • PONV (postoperative nausea and vomiting)    • Seasonal allergies    • Sleep apnea     Does not wear machine.   • Urinary tract infection    • Varicose vein of leg    • Venous insufficiency    • Vitamin D deficiency       Past Surgical History:   Procedure Laterality Date   • ABDOMINAL HYSTERECTOMY      Total   • BILATERAL SALPINGO OOPHORECTOMY     • BLADDER SUSPENSION     • COLONOSCOPY  2015   • COLONOSCOPY N/A 2021    Procedure: COLONOSCOPY with polypectomy x9;  Surgeon: Adelso Plummer MD;  Location: [facility] ENDOSCOPY;  Service: Gastroenterology;  Laterality: N/A;   • ENDOSCOPY     • ENDOVENOUS ABLATION SAPHENOUS VEIN W/ LASER Bilateral    • OOPHORECTOMY Bilateral    • ORIF ANKLE FRACTURE Right    • ORIF WRIST FRACTURE Left     unknown of surgeon   • WISDOM TOOTH EXTRACTION            The following portions of the patient's history were reviewed and updated as appropriate:problem list, current medications, allergies, past family history, past medical history, past social history and past surgical history.    Review of Systems   Constitutional:  Negative.    Respiratory: Negative.    Cardiovascular: Negative.    Gastrointestinal: Negative.    Genitourinary:        Incontinence   Musculoskeletal: Negative.    Skin: Negative.    Psychiatric/Behavioral: Negative.    All other systems reviewed and are negative.          Objective   /68   Ht 170.2 cm (67\")   Wt 121 kg (266 lb)   BMI 41.66 kg/m²     Physical Exam   Constitutional   The patient is awake, alert, well developed, well nourished and well groomed.   Neck   The neck is supple and the trachea is midline. The thyroid is not enlarged and there are no palpable nodules.   Breast  Inspection of the breast reveals symmetrical and smooth breast bilaterally without skin color changes, lesions, dimpling or skin retraction.  The nipples are  everted and without discharge.  Palpation of the breast tissue is non-tender, smooth, without masses.  The axillae are without masses.   Respiratory  The patient is relaxed and breathes without effort. Lungs CTAB  Cardiovascular  Heart regular rate and rhythm without murmur.  Gastrointestinal   The abdomen is soft and non-tender.  No hepatosplenomegaly.  Genitourinary   - External Genitalia without erythema, lesions, or masses, whitened skin on bilateral labia majora and perineum  -Urethral Meatus is without erythema, edema, prolapse or lesions.   -Bladder - Palpation at the region above the symphysis pubis             reveals no bladder tenderness.   -Vagina - There is no excessive vaginal discharge.   Vaginal walls reveals moist vaginal mucosa without inflammation or lesions    There is no evidence of pelvic relaxation; there is no cystocele or rectocele.  -Cervix  is absent  Uterus - is absent  Adnexa structures are absent  Perineum is without inflammation or lesions   Extremities  Full ROM. No cyanosis or edema   Skin  Normal in color, texture, moisture, temperature, mobility and turgor. There are no abnormal lesions.    Psychiatric  The patient is oriented to  person, place, and time. Speech is fluent and words are clear          Assessment /Plan      Diagnoses and all orders for this visit:    1. Encounter for gynecological examination without abnormal finding (Primary)  Encouraged healthy eating and exercise  2. Encounter for breast cancer screening other than mammogram  Encouraged self breast exam  Mammogram scheduled for 1/30/23  3. Stress incontinence  Continue Kegels. Consider seeing Urologist for treatment options  Other orders  -     estradiol (ESTRACE) 0.5 MG tablet; Take 1 tablet by mouth Daily.  Dispense: 90 tablet; Refill: 4  -     clobetasol (Temovate) 0.05 % cream; Apply 1 application topically to the appropriate area as directed 2 (Two) Times a Day.  Dispense: 15 g; Refill: 3             This note was electronically signed.    Leny Branham CNM   January 6, 2023

## 2023-01-30 ENCOUNTER — HOSPITAL ENCOUNTER (OUTPATIENT)
Dept: MAMMOGRAPHY | Facility: HOSPITAL | Age: 68
Discharge: HOME | End: 2023-01-30
Admitting: INTERNAL MEDICINE
Payer: MEDICARE

## 2023-01-30 ENCOUNTER — IMMUNIZATION (OUTPATIENT)
Dept: INTERNAL MEDICINE | Facility: CLINIC | Age: 68
End: 2023-01-30
Payer: MEDICARE

## 2023-01-30 ENCOUNTER — OFFICE VISIT (OUTPATIENT)
Dept: INTERNAL MEDICINE | Facility: CLINIC | Age: 68
End: 2023-01-30
Payer: MEDICARE

## 2023-01-30 VITALS
BODY MASS INDEX: 42.22 KG/M2 | SYSTOLIC BLOOD PRESSURE: 129 MMHG | HEART RATE: 77 BPM | RESPIRATION RATE: 16 BRPM | HEIGHT: 67 IN | OXYGEN SATURATION: 98 % | TEMPERATURE: 96.7 F | DIASTOLIC BLOOD PRESSURE: 69 MMHG | WEIGHT: 269 LBS

## 2023-01-30 DIAGNOSIS — Z23 NEED FOR COVID-19 VACCINE: Primary | ICD-10-CM

## 2023-01-30 DIAGNOSIS — I10 BENIGN ESSENTIAL HYPERTENSION: Primary | ICD-10-CM

## 2023-01-30 DIAGNOSIS — E78.2 MIXED HYPERLIPIDEMIA: ICD-10-CM

## 2023-01-30 DIAGNOSIS — F33.0 MILD EPISODE OF RECURRENT MAJOR DEPRESSIVE DISORDER: ICD-10-CM

## 2023-01-30 DIAGNOSIS — R73.01 IMPAIRED FASTING GLUCOSE: ICD-10-CM

## 2023-01-30 PROCEDURE — 91312 COVID-19 (PFIZER) BIVALENT BOOSTER 12+YRS: CPT | Performed by: INTERNAL MEDICINE

## 2023-01-30 PROCEDURE — 77067 SCR MAMMO BI INCL CAD: CPT

## 2023-01-30 PROCEDURE — 0124A COVID-19 (PFIZER) BIVALENT BOOSTER 12+YRS: CPT | Performed by: INTERNAL MEDICINE

## 2023-01-30 PROCEDURE — 77067 SCR MAMMO BI INCL CAD: CPT | Performed by: RADIOLOGY

## 2023-01-30 PROCEDURE — 77063 BREAST TOMOSYNTHESIS BI: CPT

## 2023-01-30 PROCEDURE — 99214 OFFICE O/P EST MOD 30 MIN: CPT | Performed by: INTERNAL MEDICINE

## 2023-01-30 PROCEDURE — 77063 BREAST TOMOSYNTHESIS BI: CPT | Performed by: RADIOLOGY

## 2023-01-30 RX ORDER — ESTRADIOL 0.5 MG/1
1 TABLET ORAL DAILY
COMMUNITY
Start: 2022-11-26

## 2023-01-30 RX ORDER — ATORVASTATIN CALCIUM 20 MG/1
20 TABLET, FILM COATED ORAL NIGHTLY
Qty: 90 TABLET | Refills: 1 | Status: SHIPPED | OUTPATIENT
Start: 2023-01-30

## 2023-01-30 RX ORDER — BUPROPION HYDROCHLORIDE 300 MG/1
300 TABLET ORAL DAILY
Qty: 90 TABLET | Refills: 2 | Status: SHIPPED | OUTPATIENT
Start: 2023-01-30

## 2023-01-30 NOTE — PROGRESS NOTES
"Chief Complaint  Hypertension and Hyperlipidemia    Subjective        Neetu Wick presents to [facility]  History of Present Illness  HPI: Patient is here to follow up on the blood pressure  The patient is taking the blood pressure medications as prescribed and has had no side effects. The patient is also here to follow up on the cholesterol and is trying to follow a diet. The patient  Had  lab work done .  The patient also complains of depression and  needs refills on medications .   Hyperlipidemia   Pertinent negatives include no chest pain or shortness of breath.   Hypertension   Pertinent negatives include no chest pain, palpitations or shortness of breath.    Objective   Vital Signs:  /69   Pulse 77   Temp 96.7 °F (35.9 °C)   Resp 16   Ht 170.2 cm (67.01\")   Wt 122 kg (269 lb)   SpO2 98%   BMI 42.12 kg/m²   Estimated body mass index is 42.12 kg/m² as calculated from the following:    Height as of this encounter: 170.2 cm (67.01\").    Weight as of this encounter: 122 kg (269 lb).       Class 3 Severe Obesity (BMI >=40). Obesity-related health conditions include the following: hypertension and dyslipidemias. Obesity is improving with lifestyle modifications. BMI is is above average; BMI management plan is completed. We discussed low calorie, low carb based diet program, portion control, increasing exercise and joining a fitness center or start home based exercise program.      Physical Exam  Vitals and nursing note reviewed.   Constitutional:       General: She is not in acute distress.     Appearance: Normal appearance. She is not diaphoretic.   HENT:      Head: Normocephalic and atraumatic.      Right Ear: External ear normal.      Left Ear: External ear normal.      Nose: Nose normal.   Eyes:      Extraocular Movements: Extraocular movements intact.      Conjunctiva/sclera: Conjunctivae normal.   Neck:      Trachea: Trachea normal.   Cardiovascular:      Rate and " Rhythm: Normal rate and regular rhythm.      Heart sounds: Normal heart sounds.   Pulmonary:      Effort: Pulmonary effort is normal. No respiratory distress.      Breath sounds: Normal breath sounds.   Abdominal:      General: Abdomen is flat.   Musculoskeletal:      Cervical back: Neck supple.      Comments: Moves all limbs   Skin:     General: Skin is warm.   Neurological:      Mental Status: She is alert and oriented to person, place, and time.      Comments: No gross motor or sensory deficits        Result Review :    Common labs    Common Labs 3/23/22 3/23/22 3/23/22 3/23/22 3/23/22 6/14/22 6/14/22 6/14/22 6/14/22 10/7/22 10/7/22 10/7/22 10/7/22    1057 1057 1057 1057 1057 1109 1109 1109 1109 0959 0959 [id number]   Glucose  110 (A)     102 (A)    119 (A)     BUN  14     12    15     Creatinine  0.88     0.78    0.76     Sodium  141     141    144     Potassium  4.3     4.2    4.4     Chloride  105     107    108 (A)     Calcium  9.0     8.8    9.0     Total Protein  6.5     6.8    6.1     Albumin  4.00     4.10    4.20     Total Bilirubin  0.8     0.8    0.5     Alkaline Phosphatase  114     109    116     AST (SGOT)  13     14    15     ALT (SGPT)  10     11    11     WBC 5.08     5.92    6.15      Hemoglobin 12.9     12.2    12.3      Hematocrit 38.6     36.7    37.1      Platelets 284     289    288      Total Cholesterol   164     135    134    Triglycerides   115     81    67    HDL Cholesterol   60     57    62 (A)    LDL Cholesterol    84     62    58    Hemoglobin A1C    6.20 (A)     6.10 (A)    6.30 (A)   Microalbumin, Urine     23.0           (A) Abnormal value       Comments are available for some flowsheets but are not being displayed.                        Assessment and Plan   Diagnoses and all orders for this visit:    1. Benign essential hypertension (Primary)    2. Mixed hyperlipidemia  -     atorvastatin (LIPITOR) 20 MG tablet; Take 1 tablet by mouth Every Night.  Dispense: 90 tablet; Refill:  1  -     CBC & Differential  -     Comprehensive Metabolic Panel  -     Lipid Panel    3. Impaired fasting glucose  -     Hemoglobin A1c    4. Mild episode of recurrent major depressive disorder (HCC)  -     buPROPion XL (WELLBUTRIN XL) 300 MG 24 hr tablet; Take 1 tablet by mouth Daily.  Dispense: 90 tablet; Refill: 2      Plan:  1.  Benign essential hypertension: Will continue current medication, low-sodium diet advised, Counseled to regularly check BP at home with goal averaging <130/80.   2.mixed hyperlipidemia:  reviewed  fasting CMP and lipid panel.  Diet and exercise counseled,  Will continue current medications  3. impaired glucose   :   reviewed  fasting CMP  and hba1c  , diet and exercise counseled   4. Major depression : refill wellbutrin        I spent 30 minutes caring for Neetu on this date of service. This time includes time spent by me in the following activities:preparing for the visit, reviewing tests, performing a medically appropriate examination and/or evaluation , counseling and educating the patient/family/caregiver, ordering medications, tests, or procedures and documenting information in the medical record  Follow Up   Return in about 15 weeks (around 5/15/2023).  Patient was given instructions and counseling regarding her condition or for health maintenance advice. Please see specific information pulled into the AVS if appropriate.

## 2023-01-31 LAB
ALBUMIN SERPL-MCNC: 4.1 G/DL (ref 3.5–5.2)
ALBUMIN/GLOB SERPL: 1.9 G/DL
ALP SERPL-CCNC: 104 U/L (ref 39–117)
ALT SERPL-CCNC: 11 U/L (ref 1–33)
AST SERPL-CCNC: 11 U/L (ref 1–32)
BASOPHILS # BLD AUTO: 0.05 10*3/MM3 (ref 0–0.2)
BASOPHILS NFR BLD AUTO: 0.8 % (ref 0–1.5)
BILIRUB SERPL-MCNC: 0.8 MG/DL (ref 0–1.2)
BUN SERPL-MCNC: 20 MG/DL (ref 8–23)
BUN/CREAT SERPL: 22.2 (ref 7–25)
CALCIUM SERPL-MCNC: 9 MG/DL (ref 8.6–10.5)
CHLORIDE SERPL-SCNC: 106 MMOL/L (ref 98–107)
CHOLEST SERPL-MCNC: 135 MG/DL (ref 0–200)
CO2 SERPL-SCNC: 28.1 MMOL/L (ref 22–29)
CREAT SERPL-MCNC: 0.9 MG/DL (ref 0.57–1)
EGFRCR SERPLBLD CKD-EPI 2021: 69.3 ML/MIN/1.73
EOSINOPHIL # BLD AUTO: 0.22 10*3/MM3 (ref 0–0.4)
EOSINOPHIL NFR BLD AUTO: 3.7 % (ref 0.3–6.2)
ERYTHROCYTE [DISTWIDTH] IN BLOOD BY AUTOMATED COUNT: 12.1 % (ref 12.3–15.4)
GLOBULIN SER CALC-MCNC: 2.2 GM/DL
GLUCOSE SERPL-MCNC: 106 MG/DL (ref 65–99)
HBA1C MFR BLD: 6.2 % (ref 4.8–5.6)
HCT VFR BLD AUTO: 35.9 % (ref 34–46.6)
HDLC SERPL-MCNC: 61 MG/DL (ref 40–60)
HGB BLD-MCNC: 11.9 G/DL (ref 12–15.9)
IMM GRANULOCYTES # BLD AUTO: 0.02 10*3/MM3 (ref 0–0.05)
IMM GRANULOCYTES NFR BLD AUTO: 0.3 % (ref 0–0.5)
LDLC SERPL CALC-MCNC: 58 MG/DL (ref 0–100)
LYMPHOCYTES # BLD AUTO: 1.47 10*3/MM3 (ref 0.7–3.1)
LYMPHOCYTES NFR BLD AUTO: 24.9 % (ref 19.6–45.3)
MCH RBC QN AUTO: 30.4 PG (ref 26.6–33)
MCHC RBC AUTO-ENTMCNC: 33.1 G/DL (ref 31.5–35.7)
MCV RBC AUTO: 91.6 FL (ref 79–97)
MONOCYTES # BLD AUTO: 0.62 10*3/MM3 (ref 0.1–0.9)
MONOCYTES NFR BLD AUTO: 10.5 % (ref 5–12)
NEUTROPHILS # BLD AUTO: 3.53 10*3/MM3 (ref 1.7–7)
NEUTROPHILS NFR BLD AUTO: 59.8 % (ref 42.7–76)
NRBC BLD AUTO-RTO: 0 /100 WBC (ref 0–0.2)
PLATELET # BLD AUTO: 249 10*3/MM3 (ref 140–450)
POTASSIUM SERPL-SCNC: 4.1 MMOL/L (ref 3.5–5.2)
PROT SERPL-MCNC: 6.3 G/DL (ref 6–8.5)
RBC # BLD AUTO: 3.92 10*6/MM3 (ref 3.77–5.28)
SODIUM SERPL-SCNC: 141 MMOL/L (ref 136–145)
TRIGL SERPL-MCNC: 86 MG/DL (ref 0–150)
VLDLC SERPL CALC-MCNC: 16 MG/DL (ref 5–40)
WBC # BLD AUTO: 5.91 10*3/MM3 (ref 3.4–10.8)

## 2023-05-08 DIAGNOSIS — I10 BENIGN ESSENTIAL HYPERTENSION: ICD-10-CM

## 2023-05-08 DIAGNOSIS — I48.20 CHRONIC ATRIAL FIBRILLATION: ICD-10-CM

## 2023-05-08 NOTE — TELEPHONE ENCOUNTER
Rx Refill Note  Requested Prescriptions     Pending Prescriptions Disp Refills    dilTIAZem CD (CARDIZEM CD) 180 MG 24 hr capsule [Pharmacy Med Name: DILTIAZEM HYDROCHLORIDE  MG Capsule Extended Release 24 Hour] 90 capsule 3     Sig: Take 1 capsule by mouth every night at bedtime.    Xarelto 20 MG tablet [Pharmacy Med Name: XARELTO 20 MG Tablet] 90 tablet 3     Sig: TAKE 1 TABLET BY MOUTH EVERY NIGHT.    metoprolol succinate XL (TOPROL-XL) 25 MG 24 hr tablet [Pharmacy Med Name: METOPROLOL SUCCINATE ER 25 MG Tablet Extended Release 24 Hour] 90 tablet 3     Sig: TAKE 1 TABLET EVERY DAY      Last office visit with prescribing clinician: 1/30/2023   Last telemedicine visit with prescribing clinician: 5/15/2023   Next office visit with prescribing clinician: 5/15/2023                         Stefani Arzate MA  05/08/23, 12:40 EDT

## 2023-05-09 RX ORDER — METOPROLOL SUCCINATE 25 MG/1
TABLET, EXTENDED RELEASE ORAL
Qty: 90 TABLET | Refills: 3 | Status: SHIPPED | OUTPATIENT
Start: 2023-05-09 | End: 2023-05-15 | Stop reason: SDUPTHER

## 2023-05-09 RX ORDER — DILTIAZEM HYDROCHLORIDE 180 MG/1
180 CAPSULE, COATED, EXTENDED RELEASE ORAL
Qty: 90 CAPSULE | Refills: 3 | Status: SHIPPED | OUTPATIENT
Start: 2023-05-09 | End: 2023-05-15 | Stop reason: SDUPTHER

## 2023-05-09 RX ORDER — RIVAROXABAN 20 MG/1
TABLET, FILM COATED ORAL
Qty: 90 TABLET | Refills: 3 | Status: SHIPPED | OUTPATIENT
Start: 2023-05-09 | End: 2023-05-15 | Stop reason: SDUPTHER

## 2023-05-10 LAB
ALBUMIN SERPL-MCNC: 4 G/DL (ref 3.5–5.2)
ALBUMIN/GLOB SERPL: 1.7 G/DL
ALP SERPL-CCNC: 114 U/L (ref 39–117)
ALT SERPL-CCNC: 14 U/L (ref 1–33)
AST SERPL-CCNC: 11 U/L (ref 1–32)
BASOPHILS # BLD AUTO: 0.07 10*3/MM3 (ref 0–0.2)
BASOPHILS NFR BLD AUTO: 1.2 % (ref 0–1.5)
BILIRUB SERPL-MCNC: 0.6 MG/DL (ref 0–1.2)
BUN SERPL-MCNC: 12 MG/DL (ref 8–23)
BUN/CREAT SERPL: 12.4 (ref 7–25)
CALCIUM SERPL-MCNC: 9.4 MG/DL (ref 8.6–10.5)
CHLORIDE SERPL-SCNC: 107 MMOL/L (ref 98–107)
CHOLEST SERPL-MCNC: 145 MG/DL (ref 0–200)
CO2 SERPL-SCNC: 27.7 MMOL/L (ref 22–29)
CREAT SERPL-MCNC: 0.97 MG/DL (ref 0.57–1)
EGFRCR SERPLBLD CKD-EPI 2021: 63.4 ML/MIN/1.73
EOSINOPHIL # BLD AUTO: 0.28 10*3/MM3 (ref 0–0.4)
EOSINOPHIL NFR BLD AUTO: 4.8 % (ref 0.3–6.2)
ERYTHROCYTE [DISTWIDTH] IN BLOOD BY AUTOMATED COUNT: 12.1 % (ref 12.3–15.4)
GLOBULIN SER CALC-MCNC: 2.4 GM/DL
GLUCOSE SERPL-MCNC: 116 MG/DL (ref 65–99)
HBA1C MFR BLD: 6.2 % (ref 4.8–5.6)
HCT VFR BLD AUTO: 36.5 % (ref 34–46.6)
HDLC SERPL-MCNC: 68 MG/DL (ref 40–60)
HGB BLD-MCNC: 12.3 G/DL (ref 12–15.9)
IMM GRANULOCYTES # BLD AUTO: 0.02 10*3/MM3 (ref 0–0.05)
IMM GRANULOCYTES NFR BLD AUTO: 0.3 % (ref 0–0.5)
LDLC SERPL CALC-MCNC: 60 MG/DL (ref 0–100)
LYMPHOCYTES # BLD AUTO: 1.26 10*3/MM3 (ref 0.7–3.1)
LYMPHOCYTES NFR BLD AUTO: 21.4 % (ref 19.6–45.3)
MCH RBC QN AUTO: 31.2 PG (ref 26.6–33)
MCHC RBC AUTO-ENTMCNC: 33.7 G/DL (ref 31.5–35.7)
MCV RBC AUTO: 92.6 FL (ref 79–97)
MONOCYTES # BLD AUTO: 0.65 10*3/MM3 (ref 0.1–0.9)
MONOCYTES NFR BLD AUTO: 11.1 % (ref 5–12)
NEUTROPHILS # BLD AUTO: 3.6 10*3/MM3 (ref 1.7–7)
NEUTROPHILS NFR BLD AUTO: 61.2 % (ref 42.7–76)
NRBC BLD AUTO-RTO: 0 /100 WBC (ref 0–0.2)
PLATELET # BLD AUTO: 259 10*3/MM3 (ref 140–450)
POTASSIUM SERPL-SCNC: 4.1 MMOL/L (ref 3.5–5.2)
PROT SERPL-MCNC: 6.4 G/DL (ref 6–8.5)
RBC # BLD AUTO: 3.94 10*6/MM3 (ref 3.77–5.28)
SODIUM SERPL-SCNC: 142 MMOL/L (ref 136–145)
TRIGL SERPL-MCNC: 90 MG/DL (ref 0–150)
VLDLC SERPL CALC-MCNC: 17 MG/DL (ref 5–40)
WBC # BLD AUTO: 5.88 10*3/MM3 (ref 3.4–10.8)

## 2023-05-15 ENCOUNTER — OFFICE VISIT (OUTPATIENT)
Dept: INTERNAL MEDICINE | Facility: CLINIC | Age: 68
End: 2023-05-15
Payer: MEDICARE

## 2023-05-15 VITALS
HEART RATE: 60 BPM | SYSTOLIC BLOOD PRESSURE: 138 MMHG | TEMPERATURE: 97.1 F | DIASTOLIC BLOOD PRESSURE: 63 MMHG | RESPIRATION RATE: 18 BRPM | WEIGHT: 266 LBS | HEIGHT: 67 IN | BODY MASS INDEX: 41.75 KG/M2 | OXYGEN SATURATION: 98 %

## 2023-05-15 DIAGNOSIS — I48.20 CHRONIC ATRIAL FIBRILLATION: ICD-10-CM

## 2023-05-15 DIAGNOSIS — E78.2 MIXED HYPERLIPIDEMIA: ICD-10-CM

## 2023-05-15 DIAGNOSIS — R73.01 IMPAIRED FASTING GLUCOSE: ICD-10-CM

## 2023-05-15 DIAGNOSIS — I10 BENIGN ESSENTIAL HYPERTENSION: Primary | ICD-10-CM

## 2023-05-15 PROCEDURE — 1159F MED LIST DOCD IN RCRD: CPT | Performed by: INTERNAL MEDICINE

## 2023-05-15 PROCEDURE — 1160F RVW MEDS BY RX/DR IN RCRD: CPT | Performed by: INTERNAL MEDICINE

## 2023-05-15 PROCEDURE — 3044F HG A1C LEVEL LT 7.0%: CPT | Performed by: INTERNAL MEDICINE

## 2023-05-15 PROCEDURE — 99214 OFFICE O/P EST MOD 30 MIN: CPT | Performed by: INTERNAL MEDICINE

## 2023-05-15 RX ORDER — DILTIAZEM HYDROCHLORIDE 180 MG/1
180 CAPSULE, COATED, EXTENDED RELEASE ORAL
Qty: 90 CAPSULE | Refills: 3 | Status: SHIPPED | OUTPATIENT
Start: 2023-05-15

## 2023-05-15 RX ORDER — METOPROLOL SUCCINATE 25 MG/1
25 TABLET, EXTENDED RELEASE ORAL DAILY
Qty: 90 TABLET | Refills: 1 | Status: SHIPPED | OUTPATIENT
Start: 2023-05-15

## 2023-05-15 RX ORDER — ATORVASTATIN CALCIUM 20 MG/1
20 TABLET, FILM COATED ORAL NIGHTLY
Qty: 90 TABLET | Refills: 1 | Status: SHIPPED | OUTPATIENT
Start: 2023-05-15

## 2023-05-15 NOTE — PROGRESS NOTES
"Chief Complaint  Hypertension (Follow up) and Hyperlipidemia    Subjective        Neetu Wick presents to [facility]  History of Present Illness  HPI: Patient is here to follow up on the blood pressure  The patient is taking the blood pressure medications as prescribed and has had no side effects. The patient is also here to follow up on the cholesterol and is trying to follow a diet. The patient is  also here to follow up on sugar and had lab work done .  The patient also is her to follow up on atrial fibrillation and follows up with cardiology and  needs refills on medications .   Hyperlipidemia   Pertinent negatives include no chest pain or shortness of breath.   Hypertension   Pertinent negatives include no chest pain, palpitations or shortness of breath.    Objective   Vital Signs:  /63   Pulse 60   Temp 97.1 °F (36.2 °C)   Resp 18   Ht 170.2 cm (67\")   Wt 121 kg (266 lb)   SpO2 98%   BMI 41.66 kg/m²   Estimated body mass index is 41.66 kg/m² as calculated from the following:    Height as of this encounter: 170.2 cm (67\").    Weight as of this encounter: 121 kg (266 lb).       Class 3 Severe Obesity (BMI >=40). Obesity-related health conditions include the following: hypertension. Obesity is unchanged. BMI is is above average; BMI management plan is completed. We discussed low calorie, low carb based diet program, portion control, increasing exercise and joining a fitness center or start home based exercise program.      Physical Exam  Vitals and nursing note reviewed.   Constitutional:       General: She is not in acute distress.     Appearance: Normal appearance. She is not diaphoretic.   HENT:      Head: Normocephalic and atraumatic.      Right Ear: External ear normal.      Left Ear: External ear normal.      Nose: Nose normal.   Eyes:      Extraocular Movements: Extraocular movements intact.      Conjunctiva/sclera: Conjunctivae normal.   Neck:      Trachea: " Trachea normal.   Cardiovascular:      Rate and Rhythm: Normal rate and regular rhythm.      Heart sounds: Normal heart sounds.   Pulmonary:      Effort: Pulmonary effort is normal. No respiratory distress.      Breath sounds: Normal breath sounds.   Abdominal:      General: Abdomen is flat.   Musculoskeletal:      Cervical back: Neck supple.      Comments: Moves all limbs  Compression stocking bilateral lower extremities   Skin:     General: Skin is warm.   Neurological:      Mental Status: She is alert and oriented to person, place, and time.      Comments: No gross motor or sensory deficits        Result Review :    Common labs        10/7/2022    09:59 1/30/2023    08:51 5/10/2023    09:13   Common Labs   Glucose 119   106   116     BUN 15   20   12     Creatinine 0.76   0.90   0.97     Sodium 144   141   142     Potassium 4.4   4.1   4.1     Chloride 108   106   107     Calcium 9.0   9.0   9.4     Total Protein 6.1   6.3   6.4     Albumin 4.20   4.1   4.0     Total Bilirubin 0.5   0.8   0.6     Alkaline Phosphatase 116   104   114     AST (SGOT) 15   11   11     ALT (SGPT) 11   11   14     WBC 6.15   5.91   5.88     Hemoglobin 12.3   11.9   12.3     Hematocrit 37.1   35.9   36.5     Platelets 288   249   259     Total Cholesterol 134   135   145     Triglycerides 67   86   90     HDL Cholesterol 62   61   68     LDL Cholesterol  58   58   60     Hemoglobin A1C 6.30   6.20   6.20                    Assessment and Plan   Diagnoses and all orders for this visit:    1. Benign essential hypertension (Primary)  -     metoprolol succinate XL (TOPROL-XL) 25 MG 24 hr tablet; Take 1 tablet by mouth Daily.  Dispense: 90 tablet; Refill: 1    2. Mixed hyperlipidemia  -     atorvastatin (LIPITOR) 20 MG tablet; Take 1 tablet by mouth Every Night.  Dispense: 90 tablet; Refill: 1  -     CBC & Differential  -     Comprehensive Metabolic Panel  -     Lipid Panel    3. Impaired fasting glucose  -     Hemoglobin A1c    4. Chronic  atrial fibrillation  -     rivaroxaban (Xarelto) 20 MG tablet; Take 1 tablet by mouth Every Night.  Dispense: 90 tablet; Refill: 3  -     dilTIAZem CD (CARDIZEM CD) 180 MG 24 hr capsule; Take 1 capsule by mouth every night at bedtime.  Dispense: 90 capsule; Refill: 3        Plan:  1.  Benign essential hypertension: Will continue current medication, low-sodium diet advised, Counseled to regularly check BP at home with goal averaging <130/80.   2.mixed hyperlipidemia:  reviewed  fasting CMP and lipid panel.  Diet and exercise counseled,  Will continue current medications  3. impaired glucose   :   reviewed  fasting CMP  and hba1c   6.2  , diet and exercise counseled   4. Atrial fibrillation: rate controlled, on anticoagulation ,  labs reviewed       I spent 30 minutes caring for Neetu on this date of service. This time includes time spent by me in the following activities:preparing for the visit, reviewing tests, performing a medically appropriate examination and/or evaluation , counseling and educating the patient/family/caregiver, ordering medications, tests, or procedures and documenting information in the medical record  Follow Up    Patient was given instructions and counseling regarding her condition or for health maintenance advice. Please see specific information pulled into the AVS if appropriate.

## 2023-06-03 ENCOUNTER — HEALTH MAINTENANCE LETTER (OUTPATIENT)
Age: 68
End: 2023-06-03

## 2023-08-26 ENCOUNTER — OFFICE VISIT (OUTPATIENT)
Dept: INTERNAL MEDICINE | Facility: CLINIC | Age: 68
End: 2023-08-26
Payer: MEDICARE

## 2023-08-26 VITALS
HEART RATE: 66 BPM | SYSTOLIC BLOOD PRESSURE: 140 MMHG | OXYGEN SATURATION: 97 % | BODY MASS INDEX: 42.38 KG/M2 | TEMPERATURE: 97.2 F | WEIGHT: 270 LBS | DIASTOLIC BLOOD PRESSURE: 88 MMHG | HEIGHT: 67 IN

## 2023-08-26 DIAGNOSIS — J06.9 ACUTE UPPER RESPIRATORY INFECTION: Primary | ICD-10-CM

## 2023-08-26 PROCEDURE — 3044F HG A1C LEVEL LT 7.0%: CPT | Performed by: NURSE PRACTITIONER

## 2023-08-26 PROCEDURE — 99213 OFFICE O/P EST LOW 20 MIN: CPT | Performed by: NURSE PRACTITIONER

## 2023-08-26 RX ORDER — FLUCONAZOLE 150 MG/1
150 TABLET ORAL ONCE
Qty: 1 TABLET | Refills: 0 | Status: SHIPPED | OUTPATIENT
Start: 2023-08-26 | End: 2023-08-26

## 2023-08-26 NOTE — PROGRESS NOTES
Chief Complaint / Reason:      Chief Complaint   Patient presents with    Sinusitis     Went to the dentist yesterday- because it hurts nose over to the ears with the teeth also. The pain can go to a 9. Dentist said to wear a mouth guard at night. Was given a rx for antibotic but not to start it until seen here.        Subjective     HPI  Patient presents today with complaints of sinus pressure and states that she went to the dentist yesterday because she was having pressure in her ears and teeth.  Does wear a  she was given a prescription for an antibiotic but has not started it as she wanted to be evaluated.  Patient is requesting a Diflucan in case she gets a yeast infection.  Denies fever chills denies blurred vision but states that she continues to have some pressure in her nose and teeth.  Blood pressure is elevated but patient denies chest pain, shortness of breath or heart palpitations.  History taken from: patient    PMH/FH/Social History were reviewed and updated appropriately in the electronic medical record.   Past Medical History:   Diagnosis Date    Afib     Colon polyp 12/03/2021    Depression     Dyslipidemia     Ear piercing     Fracture     right ankle    Fracture, radius 11/04/2015    left    H/O echocardiogram     2016    History of nuclear stress test     2016    Hypertension     Incontinence     Murmur     Obesity     Ovarian failure     Panic attacks     Pedal edema     PONV (postoperative nausea and vomiting)     Seasonal allergies     Sleep apnea     Does not wear machine.    Urinary tract infection     Varicose vein of leg     Venous insufficiency     Vitamin D deficiency      Past Surgical History:   Procedure Laterality Date    ABDOMINAL HYSTERECTOMY  1987    Total    BILATERAL SALPINGO OOPHORECTOMY  1998    BLADDER SUSPENSION  2009    COLONOSCOPY  08/03/2015    COLONOSCOPY N/A 12/03/2021    Procedure: COLONOSCOPY with polypectomy x9;  Surgeon: Adelso Plummer MD;   Location: [facility] ENDOSCOPY;  Service: Gastroenterology;  Laterality: N/A;    ENDOSCOPY      ENDOVENOUS ABLATION SAPHENOUS VEIN W/ LASER Bilateral 2015    OOPHORECTOMY Bilateral     ORIF ANKLE FRACTURE Right     ORIF WRIST FRACTURE Left     unknown of surgeon    WISDOM TOOTH EXTRACTION       Social History     Socioeconomic History    Marital status:    Tobacco Use    Smoking status: Former     Packs/day: 0.00     Years: 0.00     Pack years: 0.00     Types: Cigarettes     Start date:      Quit date: 1981     Years since quittin.7     Passive exposure: Past    Smokeless tobacco: Never    Tobacco comments:     only smoked occasionally, not daily   Vaping Use    Vaping Use: Never used   Substance and Sexual Activity    Alcohol use: Not Currently     Alcohol/week: 0.0 standard drinks     Comment: 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    Drug use: Never    Sexual activity: Not Currently     Family History   Problem Relation Age of Onset    Hypertension Mother     Obesity Mother     Arthritis Mother     Hypertension Father     Dementia Father     Atrial fibrillation Father     Obesity Sister     Heart disease Sister     Hypertension Sister     Stroke Sister     Thyroid disease Sister     Cancer Maternal Grandmother         breast    Breast cancer Maternal Grandmother 80    Cancer Maternal Aunt         breast    Breast cancer Maternal Aunt         DX AGE MID 30'S    Colon cancer Neg Hx     Ovarian cancer Neg Hx        Review of Systems:   Review of Systems      All other systems were reviewed and are negative.  Exceptions are noted in the subjective or above.      Objective     Vital Signs  Vitals:    23 1110   BP: 140/88   Pulse: 66   Temp: 97.2 °F (36.2 °C)   SpO2: 97%       Body mass index is 42.28 kg/m².          Physical Exam  Vitals and nursing note reviewed.   Constitutional:       General: She is not in acute distress.     Appearance: She is well-developed.   HENT:      Head: Normocephalic and  atraumatic.      Right Ear: Ear canal and external ear normal. Tympanic membrane is erythematous and bulging.      Left Ear: Ear canal and external ear normal. Tympanic membrane is erythematous and bulging.      Nose: Mucosal edema, congestion and rhinorrhea present.      Right Sinus: Maxillary sinus tenderness and frontal sinus tenderness present.      Left Sinus: Maxillary sinus tenderness and frontal sinus tenderness present.      Mouth/Throat:      Mouth: Mucous membranes are dry.      Pharynx: Posterior oropharyngeal erythema present. No oropharyngeal exudate.   Eyes:      Conjunctiva/sclera: Conjunctivae normal.      Right eye: Right conjunctiva is not injected.      Left eye: Left conjunctiva is not injected.      Pupils: Pupils are equal, round, and reactive to light.   Cardiovascular:      Rate and Rhythm: Normal rate and regular rhythm.      Pulses: Normal pulses.      Heart sounds: Normal heart sounds.   Pulmonary:      Effort: Pulmonary effort is normal. No respiratory distress.      Breath sounds: Normal breath sounds.   Lymphadenopathy:      Head:      Right side of head: No submental, submandibular or tonsillar adenopathy.      Left side of head: No submental, submandibular or tonsillar adenopathy.      Cervical: No cervical adenopathy.   Skin:     General: Skin is warm and dry.      Capillary Refill: Capillary refill takes less than 2 seconds.   Neurological:      Mental Status: She is alert and oriented to person, place, and time.            Results Review:    I reviewed the patient's new clinical results.       Medication Review:     Current Outpatient Medications:     atorvastatin (LIPITOR) 20 MG tablet, Take 1 tablet by mouth Every Night., Disp: 90 tablet, Rfl: 1    buPROPion XL (WELLBUTRIN XL) 300 MG 24 hr tablet, Take 1 tablet by mouth Daily., Disp: 90 tablet, Rfl: 2    clobetasol (Temovate) 0.05 % cream, Apply 1 application topically to the appropriate area as directed 2 (Two) Times a Day.,  Disp: 15 g, Rfl: 3    clotrimazole (LOTRIMIN) 1 % cream, Apply  topically to the appropriate area as directed Daily., Disp: , Rfl:     dilTIAZem CD (CARDIZEM CD) 180 MG 24 hr capsule, Take 1 capsule by mouth every night at bedtime., Disp: 90 capsule, Rfl: 3    estradiol (ESTRACE) 0.5 MG tablet, Take 1 tablet by mouth Daily., Disp: , Rfl:     fluticasone (FLONASE) 50 MCG/ACT nasal spray, 2 sprays into the nostril(s) as directed by provider Daily., Disp: 16 g, Rfl: 0    Loratadine 10 MG capsule, Take 1 capsule by mouth Daily., Disp: , Rfl:     metoprolol succinate XL (TOPROL-XL) 25 MG 24 hr tablet, Take 1 tablet by mouth Daily., Disp: 90 tablet, Rfl: 1    rivaroxaban (Xarelto) 20 MG tablet, Take 1 tablet by mouth Every Night., Disp: 90 tablet, Rfl: 3    Diagnoses and all orders for this visit:    Acute upper respiratory infection  Advised patient to start taking antibiotics since symptoms are still persistent.  Discussed nonpharmacological interventions and advised patient to maintain hydration to help loosen secretions.  Advised patient to closely monitor blood pressure as that could be putting pressure as of blood pressure is elevated.  Other orders  -     fluconazole (Diflucan) 150 MG tablet; Take 1 tablet by mouth 1 (One) Time for 1 dose.          No follow-ups on file.    Sunni Marie, APRN  08/26/2023

## 2023-09-18 ENCOUNTER — OFFICE VISIT (OUTPATIENT)
Dept: INTERNAL MEDICINE | Facility: CLINIC | Age: 68
End: 2023-09-18
Payer: MEDICARE

## 2023-09-18 VITALS
RESPIRATION RATE: 16 BRPM | SYSTOLIC BLOOD PRESSURE: 128 MMHG | DIASTOLIC BLOOD PRESSURE: 78 MMHG | HEART RATE: 61 BPM | HEIGHT: 67 IN | BODY MASS INDEX: 42.06 KG/M2 | WEIGHT: 268 LBS | TEMPERATURE: 97.8 F | OXYGEN SATURATION: 96 %

## 2023-09-18 DIAGNOSIS — E11.9 DIABETES MELLITUS WITHOUT COMPLICATION: ICD-10-CM

## 2023-09-18 DIAGNOSIS — I10 BENIGN ESSENTIAL HYPERTENSION: ICD-10-CM

## 2023-09-18 DIAGNOSIS — E78.2 MIXED HYPERLIPIDEMIA: ICD-10-CM

## 2023-09-18 DIAGNOSIS — Z00.00 ROUTINE GENERAL MEDICAL EXAMINATION AT A HEALTH CARE FACILITY: Primary | ICD-10-CM

## 2023-09-18 DIAGNOSIS — Z23 NEED FOR PNEUMOCOCCAL VACCINATION: ICD-10-CM

## 2023-09-18 NOTE — PROGRESS NOTES
The ABCs of the Annual Wellness Visit  Subsequent Medicare Wellness Visit    Chief Complaint   Patient presents with    Medicare Wellness-subsequent    Hypertension    Hyperlipidemia       Subjective      Neetu Wick is a 68 y.o. female who presents for a Subsequent Medicare Wellness Visit , physical and [employer] PAF.  Patient is here to follow up on the blood pressure  The patient is taking the blood pressure medications as prescribed and has had no side effects. The patient is also here to follow up on the cholesterol and is trying to follow a diet. The patient is  also here to follow up on  sugar and had  lab work done .  The patient also needs prevnar 20  .   Hyperlipidemia   Pertinent negatives include no chest pain or shortness of breath.   Hypertension   Pertinent negatives include no chest pain, palpitations or shortness of breath.      The following portions of the patient's history were reviewed and   updated as appropriate: allergies, current medications, past family history, past medical history, past social history, past surgical history, and problem list.    Compared to one year ago, the patient feels her physical   health is better.    Compared to one year ago, the patient feels her mental   health is the same.    Recent Hospitalizations:  She was not admitted to the hospital during the last year.       Current Medical Providers:  Patient Care Team:  Ines Hancock MD as PCP - General (Internal Medicine)  Pipo Joe MD as Consulting Physician (Cardiology)    Outpatient Medications Prior to Visit   Medication Sig Dispense Refill    atorvastatin (LIPITOR) 20 MG tablet Take 1 tablet by mouth Every Night. 90 tablet 1    buPROPion XL (WELLBUTRIN XL) 300 MG 24 hr tablet Take 1 tablet by mouth Daily. 90 tablet 2    clobetasol (Temovate) 0.05 % cream Apply 1 application topically to the appropriate area as directed 2 (Two) Times a Day. 15 g 3    clotrimazole (LOTRIMIN) 1 % cream Apply  topically to the  "appropriate area as directed Daily.      dilTIAZem CD (CARDIZEM CD) 180 MG 24 hr capsule Take 1 capsule by mouth every night at bedtime. 90 capsule 3    estradiol (ESTRACE) 0.5 MG tablet Take 1 tablet by mouth Daily.      fluticasone (FLONASE) 50 MCG/ACT nasal spray 2 sprays into the nostril(s) as directed by provider Daily. 16 g 0    Loratadine 10 MG capsule Take 1 capsule by mouth Daily.      metoprolol succinate XL (TOPROL-XL) 25 MG 24 hr tablet Take 1 tablet by mouth Daily. 90 tablet 1    rivaroxaban (Xarelto) 20 MG tablet Take 1 tablet by mouth Every Night. 90 tablet 3     No facility-administered medications prior to visit.       No opioid medication identified on active medication list. I have reviewed chart for other potential  high risk medication/s and harmful drug interactions in the elderly.        Aspirin is not on active medication list.  Aspirin use is not indicated based on review of current medical condition/s. Risk of harm outweighs potential benefits.  .    Patient Active Problem List   Diagnosis    Personal history of colonic polyps     Advance Care Planning   Advance Care Planning     Advance Directive is not on file.  ACP discussion was held with the patient during this visit. Patient does not have an advance directive, information provided.     Objective    Vitals:    09/18/23 1051   BP: 128/78   Pulse: 61   Resp: 16   Temp: 97.8 °F (36.6 °C)   SpO2: 96%   Weight: 122 kg (268 lb)   Height: 170.2 cm (67.01\")   PainSc: 0-No pain     Estimated body mass index is 41.96 kg/m² as calculated from the following:    Height as of this encounter: 170.2 cm (67.01\").    Weight as of this encounter: 122 kg (268 lb).     Physical Examination  Vitals and nursing note reviewed  General appearance: Normocephalic and nontraumatic  HEENT: External inspection of eyes, ears and nose appears benign, hearing appears intact  Neck: Neck appears supple, trachea in midline, thyroid appears not enlarged  Respiratory: " Respiratory effort appears normal  Musculoskeletal: Moving all limbs  Range of motion of visible joints appears within normal  CNS: No gross motor or sensory deficits  No gross cranial nerve deficits  No tremors  Psychiatry: Alert and oriented x3  Memory appears intact  Mood and affect appears normal  Insight appears normal        Does the patient have evidence of cognitive impairment?   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 No   Do you have difficulty bathing yourself, getting dressed or grooming yourself? No   Do you have difficulty using the toilet? No   Do you have difficulty moving around from place to place? No   Do you have trouble with steps or getting out of a bed or a chair? No   Current Diet Well Balanced Diet   Dental Exam Up to date   Eye Exam Up to date   Exercise (times per week) 2 times per week   Current Exercises Include Other;Walking;House Cleaning    Do you need help using the phone?  No   Are you deaf or do you have serious difficulty hearing?  No   Do you need help to go to places out of walking distance? No   Do you need help shopping? No   Do you need help preparing meals?  No   Do you need help with housework?  No   Do you need help with laundry? No   Do you need help taking your medications? No   Do you need help managing money? No   Do you ever drive or ride in a car without wearing a seat belt? No   Have you felt unusual stress, anger or loneliness in the last month? No   Who do you live with? Alone   If you need help, do you have trouble finding someone available to you? No   Have you been bothered in the last four weeks by sexual problems? No   Do you have difficulty concentrating, remembering or making decisions? No       Age-appropriate Screening Schedule:  Refer to the list below for future screening recommendations based on patient's age, sex and/or medical conditions. Orders for these recommended tests are listed in the plan section. The patient has been provided with a written plan.    Health Maintenance   Topic Date Due    HEPATITIS C SCREENING  Never done    DIABETIC FOOT EXAM  Never done    DIABETIC EYE EXAM  Never done    URINE MICROALBUMIN  03/23/2023    COVID-19 Vaccine (5 - Pfizer series) 05/30/2023    DXA SCAN  07/26/2023    INFLUENZA VACCINE  10/01/2023    HEMOGLOBIN A1C  11/10/2023    LIPID PANEL  05/10/2024    BMI FOLLOWUP  05/15/2024    ANNUAL WELLNESS VISIT  09/18/2024    COLORECTAL CANCER SCREENING  12/03/2024    MAMMOGRAM  01/30/2025    TDAP/TD VACCINES (3 - Td or Tdap) 08/15/2026    Pneumococcal Vaccine 65+  Completed    ZOSTER VACCINE  Completed                  CMS Preventative Services Quick Reference  Risk Factors Identified During Encounter:    Immunizations Discussed/Encouraged: Prevnar 20 (Pneumococcal 20-valent conjugate)    The above risks/problems have been discussed with the patient.  Pertinent information has been  shared with the patient in the After Visit Summary.    Diagnoses and all orders for this visit:    1. Routine general medical examination at a health care facility (Primary)    2. Benign essential hypertension    3. Mixed hyperlipidemia    4. Diabetes mellitus without complication    5. Need for pneumococcal vaccination  -     Pneumococcal Conjugate Vaccine 20-Valent (PCV20)      Plan:  1.physical: Physical exam conducted today, reviewed fasting lab work,   Impression: healthy adult Patient. Currently, patient eats a healthy diet. Screening lab work includes glucose, lipid profile and complete blood count . The risks and benefits of immunizations were discussed and immunizations are up to date. Advice and education were given regarding nutrition and aerobic exercise. Patient discussion: discussed with the patient.  Annual Wellness Visit ,  Physical and [employer] PAF with preventive exam as well as age and risk appropriate counseling completed.   Advance Directive Planning: paperwork and instructions were given to the patient.   Patient Discussion: plan discussed with the patient, follow-up visit needed in one year. Medication Review and medication list updated  2.  Benign essential hypertension: Will continue current medication, low-sodium diet advised, Counseled to regularly check BP at home with goal averaging <130/80.   3 .mixed hyperlipidemia:  reviewed fasting CMP and lipid panel.  Diet and exercise counseled,  Will continue current medications  4 . Diabetes  Mellitus  :  reviewed   fasting CMP  and hba1c  , diet and exercise counseled , Will continue current medications   5. Need for prevnar 20 : given today and well tolerated     Follow Up:   Next Medicare Wellness visit to be scheduled in 1 year.      An After Visit Summary and PPPS were made available to the patient.

## 2023-11-04 LAB
ALBUMIN SERPL-MCNC: 4.2 G/DL (ref 3.5–5.2)
ALBUMIN/GLOB SERPL: 1.8 G/DL
ALP SERPL-CCNC: 106 U/L (ref 39–117)
ALT SERPL-CCNC: 11 U/L (ref 1–33)
AST SERPL-CCNC: 14 U/L (ref 1–32)
BASOPHILS # BLD AUTO: 0.06 10*3/MM3 (ref 0–0.2)
BASOPHILS NFR BLD AUTO: 1 % (ref 0–1.5)
BILIRUB SERPL-MCNC: 0.6 MG/DL (ref 0–1.2)
BUN SERPL-MCNC: 14 MG/DL (ref 8–23)
BUN/CREAT SERPL: 17.7 (ref 7–25)
CALCIUM SERPL-MCNC: 9.2 MG/DL (ref 8.6–10.5)
CHLORIDE SERPL-SCNC: 106 MMOL/L (ref 98–107)
CHOLEST SERPL-MCNC: 149 MG/DL (ref 0–200)
CO2 SERPL-SCNC: 26.6 MMOL/L (ref 22–29)
CREAT SERPL-MCNC: 0.79 MG/DL (ref 0.57–1)
EGFRCR SERPLBLD CKD-EPI 2021: 80.6 ML/MIN/1.73
EOSINOPHIL # BLD AUTO: 0.24 10*3/MM3 (ref 0–0.4)
EOSINOPHIL NFR BLD AUTO: 4 % (ref 0.3–6.2)
ERYTHROCYTE [DISTWIDTH] IN BLOOD BY AUTOMATED COUNT: 12.1 % (ref 12.3–15.4)
GLOBULIN SER CALC-MCNC: 2.3 GM/DL
GLUCOSE SERPL-MCNC: 113 MG/DL (ref 65–99)
HBA1C MFR BLD: 6.3 % (ref 4.8–5.6)
HCT VFR BLD AUTO: 38.1 % (ref 34–46.6)
HDLC SERPL-MCNC: 63 MG/DL (ref 40–60)
HGB BLD-MCNC: 12.7 G/DL (ref 12–15.9)
IMM GRANULOCYTES # BLD AUTO: 0.02 10*3/MM3 (ref 0–0.05)
IMM GRANULOCYTES NFR BLD AUTO: 0.3 % (ref 0–0.5)
LDLC SERPL CALC-MCNC: 70 MG/DL (ref 0–100)
LYMPHOCYTES # BLD AUTO: 1.32 10*3/MM3 (ref 0.7–3.1)
LYMPHOCYTES NFR BLD AUTO: 22 % (ref 19.6–45.3)
MCH RBC QN AUTO: 31.4 PG (ref 26.6–33)
MCHC RBC AUTO-ENTMCNC: 33.3 G/DL (ref 31.5–35.7)
MCV RBC AUTO: 94.3 FL (ref 79–97)
MONOCYTES # BLD AUTO: 0.64 10*3/MM3 (ref 0.1–0.9)
MONOCYTES NFR BLD AUTO: 10.7 % (ref 5–12)
NEUTROPHILS # BLD AUTO: 3.71 10*3/MM3 (ref 1.7–7)
NEUTROPHILS NFR BLD AUTO: 62 % (ref 42.7–76)
NRBC BLD AUTO-RTO: 0 /100 WBC (ref 0–0.2)
PLATELET # BLD AUTO: 264 10*3/MM3 (ref 140–450)
POTASSIUM SERPL-SCNC: 4.2 MMOL/L (ref 3.5–5.2)
PROT SERPL-MCNC: 6.5 G/DL (ref 6–8.5)
RBC # BLD AUTO: 4.04 10*6/MM3 (ref 3.77–5.28)
SODIUM SERPL-SCNC: 143 MMOL/L (ref 136–145)
TRIGL SERPL-MCNC: 87 MG/DL (ref 0–150)
VLDLC SERPL CALC-MCNC: 16 MG/DL (ref 5–40)
WBC # BLD AUTO: 5.99 10*3/MM3 (ref 3.4–10.8)

## 2023-11-06 ENCOUNTER — OFFICE VISIT (OUTPATIENT)
Dept: INTERNAL MEDICINE | Facility: CLINIC | Age: 68
End: 2023-11-06
Payer: MEDICARE

## 2023-11-06 VITALS
TEMPERATURE: 96.9 F | DIASTOLIC BLOOD PRESSURE: 79 MMHG | HEART RATE: 71 BPM | RESPIRATION RATE: 16 BRPM | OXYGEN SATURATION: 96 % | BODY MASS INDEX: 42.53 KG/M2 | HEIGHT: 67 IN | SYSTOLIC BLOOD PRESSURE: 143 MMHG | WEIGHT: 271 LBS

## 2023-11-06 DIAGNOSIS — E78.2 MIXED HYPERLIPIDEMIA: ICD-10-CM

## 2023-11-06 DIAGNOSIS — Z23 NEED FOR INFLUENZA VACCINATION: ICD-10-CM

## 2023-11-06 DIAGNOSIS — E66.01 MORBID OBESITY WITH BMI OF 40.0-44.9, ADULT: ICD-10-CM

## 2023-11-06 DIAGNOSIS — B07.0 PLANTAR WART, RIGHT FOOT: ICD-10-CM

## 2023-11-06 DIAGNOSIS — E11.9 DIABETES MELLITUS WITHOUT COMPLICATION: ICD-10-CM

## 2023-11-06 DIAGNOSIS — I10 BENIGN ESSENTIAL HYPERTENSION: Primary | ICD-10-CM

## 2023-11-06 PROCEDURE — 3044F HG A1C LEVEL LT 7.0%: CPT | Performed by: INTERNAL MEDICINE

## 2023-11-06 PROCEDURE — 1160F RVW MEDS BY RX/DR IN RCRD: CPT | Performed by: INTERNAL MEDICINE

## 2023-11-06 PROCEDURE — G0008 ADMIN INFLUENZA VIRUS VAC: HCPCS | Performed by: INTERNAL MEDICINE

## 2023-11-06 PROCEDURE — 99214 OFFICE O/P EST MOD 30 MIN: CPT | Performed by: INTERNAL MEDICINE

## 2023-11-06 PROCEDURE — 90662 IIV NO PRSV INCREASED AG IM: CPT | Performed by: INTERNAL MEDICINE

## 2023-11-06 PROCEDURE — 1159F MED LIST DOCD IN RCRD: CPT | Performed by: INTERNAL MEDICINE

## 2023-11-06 RX ORDER — ATORVASTATIN CALCIUM 20 MG/1
20 TABLET, FILM COATED ORAL NIGHTLY
Qty: 90 TABLET | Refills: 1 | Status: SHIPPED | OUTPATIENT
Start: 2023-11-06

## 2023-11-06 RX ORDER — METOPROLOL SUCCINATE 25 MG/1
25 TABLET, EXTENDED RELEASE ORAL DAILY
Qty: 90 TABLET | Refills: 1 | Status: SHIPPED | OUTPATIENT
Start: 2023-11-06

## 2023-11-06 NOTE — PROGRESS NOTES
"Chief Complaint  Hypertension, Hyperlipidemia, and Diabetes    Subjective        Neetu Wick presents to [facility]  HPI: Patient is here to follow up on the blood pressure  The patient is taking the blood pressure medications as prescribed and has had no side effects. The patient is also here to follow up on the cholesterol and is trying to follow a diet.  She has gained weight , she complains of a wart below her right foot , The patient is also here to follow on sugar and had  lab work done . The patient also needs refills on medications and flu shot.   Hypertension   Pertinent negatives include no chest pain, palpitations or shortness of breath.   Hyperlipidemia   Pertinent negatives include no chest pain or shortness of breath.   Diabetes   Pertinent negatives for hypoglycemia include no dizziness, nervousness/anxiousness or pallor. Pertinent negatives for diabetes include no chest pain, no shortness of breath  Patient is on acei/arb     Hypertension        Objective   Vital Signs:  /79   Pulse 71   Temp 96.9 °F (36.1 °C)   Resp 16   Ht 170.2 cm (67.01\")   Wt 123 kg (271 lb)   SpO2 96%   BMI 42.43 kg/m²   Estimated body mass index is 42.43 kg/m² as calculated from the following:    Height as of this encounter: 170.2 cm (67.01\").    Weight as of this encounter: 123 kg (271 lb).               Physical Exam  Vitals and nursing note reviewed.   Constitutional:       General: She is not in acute distress.     Appearance: Normal appearance. She is obese. She is not diaphoretic.   HENT:      Head: Normocephalic and atraumatic.      Right Ear: External ear normal.      Left Ear: External ear normal.      Nose: Nose normal.   Eyes:      Extraocular Movements: Extraocular movements intact.      Conjunctiva/sclera: Conjunctivae normal.   Neck:      Trachea: Trachea normal.   Cardiovascular:      Rate and Rhythm: Normal rate and regular rhythm.      Heart sounds: Normal heart " sounds.   Pulmonary:      Effort: Pulmonary effort is normal. No respiratory distress.      Breath sounds: Normal breath sounds.   Abdominal:      General: Abdomen is flat.   Musculoskeletal:      Cervical back: Neck supple.      Comments: Moves all limbs   Skin:     General: Skin is warm.      Comments: plantar wart   Neurological:      Mental Status: She is alert and oriented to person, place, and time.      Comments: No gross motor or sensory deficits        Result Review :  The following data was reviewed by: Ines Hancock MD on 11/06/2023:  Common labs          1/30/2023    08:51 5/10/2023    09:13 11/3/2023    09:39   Common Labs   Glucose 106  116  113    BUN 20  12  14    Creatinine 0.90  0.97  0.79    Sodium 141  142  143    Potassium 4.1  4.1  4.2    Chloride 106  107  106    Calcium 9.0  9.4  9.2    Total Protein 6.3  6.4  6.5    Albumin 4.1  4.0  4.2    Total Bilirubin 0.8  0.6  0.6    Alkaline Phosphatase 104  114  106    AST (SGOT) 11  11  14    ALT (SGPT) 11  14  11    WBC 5.91  5.88  5.99    Hemoglobin 11.9  12.3  12.7    Hematocrit 35.9  36.5  38.1    Platelets 249  259  264    Total Cholesterol 135  145  149    Triglycerides 86  90  87    HDL Cholesterol 61  68  63    LDL Cholesterol  58  60  70    Hemoglobin A1C 6.20  6.20  6.30                   Assessment and Plan   Diagnoses and all orders for this visit:    1. Benign essential hypertension (Primary)  -     metoprolol succinate XL (TOPROL-XL) 25 MG 24 hr tablet; Take 1 tablet by mouth Daily.  Dispense: 90 tablet; Refill: 1    2. Mixed hyperlipidemia  -     atorvastatin (LIPITOR) 20 MG tablet; Take 1 tablet by mouth Every Night.  Dispense: 90 tablet; Refill: 1  -     CBC & Differential  -     Comprehensive Metabolic Panel  -     Lipid Panel    3. Diabetes mellitus without complication  -     Semaglutide,0.25 or 0.5MG/DOS, (OZEMPIC) 2 MG/1.5ML solution pen-injector; Inject 0.25 mg under the skin into the appropriate area as directed 1 (One) Time  Per Week.  Dispense: 3 mL; Refill: 2  -     Hemoglobin A1c  -     Microalbumin / Creatinine Urine Ratio - Urine, Clean Catch    4. Morbid obesity with BMI of 40.0-44.9, adult  -     Semaglutide,0.25 or 0.5MG/DOS, (OZEMPIC) 2 MG/1.5ML solution pen-injector; Inject 0.25 mg under the skin into the appropriate area as directed 1 (One) Time Per Week.  Dispense: 3 mL; Refill: 2    5. Plantar wart, right foot  -     Ambulatory Referral to Dermatology    6. Need for influenza vaccination    Plan:  1.  Benign essential hypertension: Will continue current medication, low-sodium diet advised, Counseled to regularly check BP at home with goal averaging <130/80.   2.mixed hyperlipidemia:  reviewed   fasting CMP and lipid panel.  Diet and exercise counseled,  Will continue current medications  3. Diabetes  Mellitus  :   reviewed   fasting CMP  and hba1c  6.3  , diet and exercise counseled , Will  give trial with ozempic 0.25 mg  qweek  4.  Morbid obesity bmi 42.4 :   diet and exercise counseled , Will  give trial with ozempic 0.25 mg  qweek  5. Plantar wart : refer to dermatology  6.  Vaccine: Given today and well-tolerated       Follow Up   Return in about 3 months (around 2/13/2024).  Patient was given instructions and counseling regarding her condition or for health maintenance advice. Please see specific information pulled into the AVS if appropriate.

## 2023-12-24 NOTE — PROGRESS NOTES
Patient is doing well and is being followed according to NCCN Guidelines. She understands that her imaging and exams have remained stable and is comfortable being followed in a conservative fashion. She understands the importance of monthly self-breast examination and knows to report any and all changes as they occur.    NOTE:::We viewed her films together at today's visit.  We discussed the multiple views obtained and the important findings.  Even benign changes were mentioned and her questions were answered.  She knows that she may receive a formal letter or report from the Radiologist.  She is to contact us if she has questions.    She should continue her Tamoxifen as directed.  We discussed some of the side effects and methods to combat these.  Should she have any issues, she can contact us as needed.    Labs obtained today: CBC, Chem 12, CEA, LDH, CA27/29 - after resulted, these will be compared to her previous values and all abnormal values will be phoned to her for discussion.     The ABCs of the Annual Wellness Visit  Subsequent Medicare Wellness Visit    Chief Complaint   Patient presents with   • Medicare Wellness-subsequent   • Hypertension   • Hyperlipidemia      Subjective    History of Present Illness:  Neetu Wick is a 67 y.o. female who presents for a Subsequent Medicare Wellness Visit , physical with Roxana MORAN.  Patient is here to follow up on the blood pressure  The patient is taking the blood pressure medications as prescribed and has had no side effects. The patient is also here to follow up on the cholesterol and is trying to follow a diet. The patient had lab work done .     The following portions of the patient's history were reviewed and   updated as appropriate: allergies, current medications, past family history, past medical history, past social history, past surgical history and problem list.    Compared to one year ago, the patient feels her physical   health is the same.    Compared to one year ago, the patient feels her mental   health is the same.    Recent Hospitalizations:  She was not admitted to the hospital during the last year.       Current Medical Providers:  Patient Care Team:  Ines Hancock MD as PCP - General (Internal Medicine)  Pipo Joe MD as Consulting Physician (Cardiology)    Outpatient Medications Prior to Visit   Medication Sig Dispense Refill   • atorvastatin (LIPITOR) 20 MG tablet Take 1 tablet by mouth Every Night. 90 tablet 3   • benzonatate (TESSALON) 100 MG capsule Take 1 capsule by mouth 3 (Three) Times a Day As Needed for Cough. 30 capsule 0   • buPROPion XL (WELLBUTRIN XL) 300 MG 24 hr tablet Take 1 tablet by mouth Daily. 90 tablet 3   • clotrimazole (LOTRIMIN) 1 % cream Apply  topically to the appropriate area as directed Daily.     • dilTIAZem CD (dilTIAZem CD) 180 MG 24 hr capsule Take 1 capsule by mouth every night at bedtime. 90 capsule 3   • estradiol (ESTRACE) 0.5 MG tablet TAKE 1 TABLET EVERY DAY 90 tablet 0   •  "fluticasone (FLONASE) 50 MCG/ACT nasal spray 2 sprays into the nostril(s) as directed by provider Daily. 16 g 0   • Loratadine 10 MG capsule Take 10 mg by mouth Daily.     • metoprolol succinate XL (TOPROL-XL) 25 MG 24 hr tablet Take 1 tablet by mouth Daily. 90 tablet 3   • rivaroxaban (XARELTO) 20 MG tablet Take 1 tablet by mouth Every Night. 90 tablet 3   • sodium chloride (Ocean Nasal Spray) 0.65 % nasal spray 1 spray into the nostril(s) as directed by provider As Needed for Congestion. 104 mL 0   • azithromycin (Zithromax Z-Davis) 250 MG tablet Take 2 tablets the first day, then 1 tablet daily for 4 days. 6 tablet 0     No facility-administered medications prior to visit.       No opioid medication identified on active medication list. I have reviewed chart for other potential  high risk medication/s and harmful drug interactions in the elderly.          Aspirin is not on active medication list.  Aspirin use is not indicated based on review of current medical condition/s. Risk of harm outweighs potential benefits.  .    Patient Active Problem List   Diagnosis   • Personal history of colonic polyps     Advance Care Planning  Advance Directive is not on file.  ACP discussion was held with the patient during this visit. Patient does not have an advance directive, declines further assistance.          Objective    Vitals:    09/13/22 1024   BP: 138/78   Pulse: 64   Resp: 16   Temp: 97.3 °F (36.3 °C)   SpO2: 97%   Weight: 120 kg (264 lb)   Height: 170.2 cm (67.01\")   PainSc: 0-No pain     Estimated body mass index is 41.34 kg/m² as calculated from the following:    Height as of this encounter: 170.2 cm (67.01\").    Weight as of this encounter: 120 kg (264 lb).    Class 3 Severe Obesity (BMI >=40). Obesity-related health conditions include the following: hypertension and dyslipidemias. Obesity is improving with lifestyle modifications. BMI is is above average; BMI management plan is completed. We discussed low calorie, " low carb based diet program, portion control, increasing exercise and joining a fitness center or start home based exercise program.      Does the patient have evidence of cognitive impairment? No    Physical Exam  Vitals and nursing note reviewed.   Constitutional:       General: She is not in acute distress.     Appearance: Normal appearance. She is not diaphoretic.   HENT:      Head: Normocephalic and atraumatic.      Right Ear: External ear normal.      Left Ear: External ear normal.      Nose: Nose normal.   Eyes:      Extraocular Movements: Extraocular movements intact.      Conjunctiva/sclera: Conjunctivae normal.   Neck:      Trachea: Trachea normal.   Cardiovascular:      Rate and Rhythm: Normal rate and regular rhythm.      Heart sounds: Normal heart sounds.   Pulmonary:      Effort: Pulmonary effort is normal. No respiratory distress.      Breath sounds: Normal breath sounds.   Abdominal:      General: Abdomen is flat.   Musculoskeletal:      Cervical back: Neck supple.      Comments: Moves all limbs   Skin:     General: Skin is warm.   Neurological:      Mental Status: She is alert and oriented to person, place, and time.      Comments: No gross motor or sensory deficits                 HEALTH RISK ASSESSMENT    Smoking Status:  Social History     Tobacco Use   Smoking Status Former Smoker   • Packs/day: 0.00   • Years: 0.00   • Pack years: 0.00   • Types: Cigarettes   • Start date:    • Quit date: 1981   • Years since quittin.7   Smokeless Tobacco Never Used   Tobacco Comment    only smoked occasionally, not daily     Alcohol Consumption:  Social History     Substance and Sexual Activity   Alcohol Use Not Currently   • Alcohol/week: 0.0 standard drinks    Comment: 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     Fall Risk Screen:    LB Fall Risk Assessment was completed, and patient is at HIGH risk for falls. Assessment completed on:2022    Depression Screening:  PHQ-2/PHQ-9 Depression Screening  9/13/2022   Retired PHQ-9 Total Score -   Retired Total Score -   Little Interest or Pleasure in Doing Things 0-->not at all   Feeling Down, Depressed or Hopeless 0-->not at all   PHQ-9: Brief Depression Severity Measure Score 0       Health Habits and Functional and Cognitive Screening:  Functional & Cognitive Status 9/13/2022   Do you have difficulty preparing food and eating? No   Do you have difficulty bathing yourself, getting dressed or grooming yourself? No   Do you have difficulty using the toilet? No   Do you have difficulty moving around from place to place? No   Do you have trouble with steps or getting out of a bed or a chair? No   Current Diet Well Balanced Diet   Dental Exam Up to date   Eye Exam Not up to date   Exercise (times per week) 6 times per week   Current Exercises Include House Cleaning;Walking   Do you need help using the phone?  No   Are you deaf or do you have serious difficulty hearing?  No   Do you need help with transportation? No   Do you need help shopping? No   Do you need help preparing meals?  No   Do you need help with housework?  No   Do you need help with laundry? No   Do you need help taking your medications? No   Do you need help managing money? No   Do you ever drive or ride in a car without wearing a seat belt? No   Have you felt unusual stress, anger or loneliness in the last month? No   Who do you live with? Alone   If you need help, do you have trouble finding someone available to you? No   Have you been bothered in the last four weeks by sexual problems? No   Do you have difficulty concentrating, remembering or making decisions? No       Age-appropriate Screening Schedule:  Refer to the list below for future screening recommendations based on patient's age, sex and/or medical conditions. Orders for these recommended tests are listed in the plan section. The patient has been provided with a written plan.    Health Maintenance   Topic Date Due   • DIABETIC FOOT EXAM   Never done   • DIABETIC EYE EXAM  Never done   • INFLUENZA VACCINE  10/01/2022   • HEMOGLOBIN A1C  12/14/2022   • URINE MICROALBUMIN  03/23/2023   • MAMMOGRAM  05/19/2023   • LIPID PANEL  06/14/2023   • DXA SCAN  07/26/2023   • TDAP/TD VACCINES (3 - Td or Tdap) 08/15/2026   • ZOSTER VACCINE  Completed              Assessment & Plan   CMS Preventative Services Quick Reference  Risk Factors Identified During Encounter  Cardiovascular Disease  Fall Risk-High or Moderate  The above risks/problems have been discussed with the patient.  Follow up actions/plans if indicated are seen below in the Assessment/Plan Section.  Pertinent information has been shared with the patient in the After Visit Summary.    Diagnoses and all orders for this visit:    1. Medicare annual wellness visit, subsequent (Primary)    2. At high risk for falls    3. Routine general medical examination at a health care facility    4. Benign essential hypertension    5. Mixed hyperlipidemia  -     CBC & Differential  -     Comprehensive Metabolic Panel  -     Lipid Panel    6. Impaired fasting glucose  -     Hemoglobin A1c    Plan:  1.physical: Physical exam conducted today, reviewed fasting lab work,   Impression: healthy adult Patient. Currently, patient eats a healthy diet. Screening lab work includes glucose, lipid profile and complete blood count . The risks and benefits of immunizations were discussed and immunizations are up to date. Advice and education were given regarding nutrition and aerobic exercise. Patient discussion: discussed with the patient.  Annual Wellness Visit , physical with Roxana PAF with preventive exam as well as age and risk appropriate counseling completed.   Advance Directive Planning: paperwork and instructions were given to the patient.   Patient Discussion: plan discussed with the patient, follow-up visit needed in one year. Medication Review and medication list updated  2.  Benign essential hypertension: Will continue  current medication, low-sodium diet advised, Counseled to regularly check BP at home with goal averaging <130/80.   3.mixed hyperlipidemia:  reviewed  fasting CMP and lipid panel.  Diet and exercise counseled,  Will continue current medications  4 . impaired glucose   :   reviewed  fasting CMP  and hba1c  , diet and exercise counseled    Follow Up:   Return in about 1 year (around 9/13/2023) for Medicare Wellness.     An After Visit Summary and PPPS were made available to the patient.

## 2024-01-23 DIAGNOSIS — E11.9 DIABETES MELLITUS WITHOUT COMPLICATION: ICD-10-CM

## 2024-01-23 DIAGNOSIS — E66.01 MORBID OBESITY WITH BMI OF 40.0-44.9, ADULT: ICD-10-CM

## 2024-01-23 RX ORDER — SEMAGLUTIDE 0.68 MG/ML
INJECTION, SOLUTION SUBCUTANEOUS
Refills: 3 | OUTPATIENT
Start: 2024-01-23

## 2024-01-23 NOTE — TELEPHONE ENCOUNTER
Rx Refill Note  Requested Prescriptions     Pending Prescriptions Disp Refills    Ozempic, 0.25 or 0.5 MG/DOSE, 2 MG/3ML solution pen-injector [Pharmacy Med Name: OZEMPIC 2 MG/3ML Solution Pen-injector]  3     Sig: INJECT 0.25 MG UNDER THE SKIN INTO THE APPROPRIATE AREA AS DIRECTED 1 (ONE) TIME PER WEEK.      Last office visit with prescribing clinician: 11/6/2023   Last telemedicine visit with prescribing clinician: Visit date not found   Next office visit with prescribing clinician: 9/23/2024                         Would you like a call back once the refill request has been completed: [] Yes [] No    If the office needs to give you a call back, can they leave a voicemail: [] Yes [] No    Elvira Santana CMA  01/23/24, 16:28 EST

## 2024-03-08 ENCOUNTER — OFFICE VISIT (OUTPATIENT)
Dept: OBSTETRICS AND GYNECOLOGY | Facility: CLINIC | Age: 69
End: 2024-03-08
Payer: MEDICARE

## 2024-03-08 VITALS
SYSTOLIC BLOOD PRESSURE: 144 MMHG | BODY MASS INDEX: 41.91 KG/M2 | DIASTOLIC BLOOD PRESSURE: 90 MMHG | WEIGHT: 267 LBS | HEIGHT: 67 IN

## 2024-03-08 DIAGNOSIS — Z12.31 BREAST CANCER SCREENING BY MAMMOGRAM: ICD-10-CM

## 2024-03-08 DIAGNOSIS — Z79.890 HORMONE REPLACEMENT THERAPY: ICD-10-CM

## 2024-03-08 DIAGNOSIS — Z12.39 ENCOUNTER FOR BREAST CANCER SCREENING OTHER THAN MAMMOGRAM: ICD-10-CM

## 2024-03-08 DIAGNOSIS — Z01.419 ENCOUNTER FOR GYNECOLOGICAL EXAMINATION WITHOUT ABNORMAL FINDING: Primary | ICD-10-CM

## 2024-03-08 DIAGNOSIS — N39.498 OTHER URINARY INCONTINENCE: ICD-10-CM

## 2024-03-08 RX ORDER — ESTRADIOL 0.5 MG/1
0.5 TABLET ORAL DAILY
Qty: 90 TABLET | Refills: 3 | Status: SHIPPED | OUTPATIENT
Start: 2024-03-08

## 2024-03-08 RX ORDER — CLOBETASOL PROPIONATE 0.5 MG/G
1 CREAM TOPICAL 2 TIMES DAILY
Qty: 15 G | Refills: 3 | Status: SHIPPED | OUTPATIENT
Start: 2024-03-08

## 2024-03-08 NOTE — PROGRESS NOTES
Subjective   Chief Complaint   Patient presents with    Gynecologic Exam     Last annual 23      Neetu Wick is a 68 y.o. year old  presenting to be seen for her annual exam.   She is doing well on HRT and wants to continue.  She continues to have incontinence issues and wears a pad but deals with it. Hx of sling    Past Medical History:   Diagnosis Date    Afib     Colon polyp 2021    Depression     Dyslipidemia     Ear piercing     Fracture     right ankle    Fracture, radius 2015    left    H/O echocardiogram     2016    History of nuclear stress test     2016    Hypertension     Incontinence     Murmur     Obesity     Ovarian failure     Panic attacks     Pedal edema     PONV (postoperative nausea and vomiting)     Seasonal allergies     Sleep apnea     Does not wear machine.    Urinary tract infection     Varicose vein of leg     Venous insufficiency     Vitamin D deficiency       Past Surgical History:   Procedure Laterality Date    ABDOMINAL HYSTERECTOMY      Total    BILATERAL SALPINGO OOPHORECTOMY      BLADDER SUSPENSION  2009    COLONOSCOPY  2015    COLONOSCOPY N/A 2021    Procedure: COLONOSCOPY with polypectomy x9;  Surgeon: Adelso Plummer MD;  Location: [facility] ENDOSCOPY;  Service: Gastroenterology;  Laterality: N/A;    ENDOSCOPY      ENDOVENOUS ABLATION SAPHENOUS VEIN W/ LASER Bilateral 2015    OOPHORECTOMY Bilateral     ORIF ANKLE FRACTURE Right     ORIF WRIST FRACTURE Left     unknown of surgeon    WISDOM TOOTH EXTRACTION            The following portions of the patient's history were reviewed and updated as appropriate:problem list, current medications, allergies, past family history, past medical history, past social history and past surgical history.    Review of Systems   Constitutional: Negative.    Respiratory: Negative.     Cardiovascular: Negative.    Gastrointestinal: Negative.    Genitourinary: Negative.    Musculoskeletal:  "Negative.    Neurological: Negative.    Psychiatric/Behavioral: Negative.     All other systems reviewed and are negative.          Objective   /90   Ht 170.2 cm (67\")   Wt 121 kg (267 lb)   BMI 41.82 kg/m²     Physical Exam   Constitutional   The patient is awake, alert, well developed, well nourished and well groomed.   Neck   The neck is supple and the trachea is midline. The thyroid is not enlarged and there are no palpable nodules.   Breast  Inspection of the breast reveals symmetrical and smooth breast bilaterally without skin color changes, lesions, dimpling or skin retraction.  The nipples are  everted and without discharge.  Palpation of the breast tissue is non-tender, smooth, without masses.  The axillae are without masses.   Respiratory  The patient is relaxed and breathes without effort. Lungs CTAB  Cardiovascular  Heart regular rate and rhythm without murmur.  Gastrointestinal   The abdomen is soft and non-tender.  No hepatosplenomegaly.  Genitourinary   - External Genitalia without erythema, lesions, or masses  -Urethral Meatus is without erythema, edema, prolapse or lesions.   -Bladder - Palpation at the region above the symphysis pubis             reveals no bladder tenderness.   -Vagina - There is no excessive vaginal discharge.   Vaginal walls reveals moist vaginal mucosa without inflammation or lesions    There is no evidence of pelvic relaxation; there is no cystocele or rectocele.  -Cervix  is absent  Uterus - absent  Adnexa structures are absent  Perineum is without inflammation or lesions   Extremities  Full ROM. No cyanosis or edema   Skin  Normal in color, texture, moisture, temperature, mobility and turgor. There are no abnormal lesions.    Psychiatric  The patient is oriented to person, place, and time. Speech is fluent and words are clear          Assessment /Plan      Diagnoses and all orders for this visit:    1. Encounter for gynecological examination without abnormal finding " (Primary)  Encouraged healthy eating and exercise  2. Encounter for breast cancer screening other than mammogram  3. Breast cancer screening by mammogram  -     Mammo Screening Digital Tomosynthesis Bilateral With CAD  Encouraged self breast exam  4. Other urinary incontinence  Discussed evaluation by urologist if desires.  5. Hormone replacement therapy       clobetasol propionate (Temovate) 0.05 % cream; Apply 1 Application topically to the appropriate area as directed 2 (Two) Times a Day.  Dispense: 15 g; Refill: 3  -     estradiol (ESTRACE) 0.5 MG tablet; Take 1 tablet by mouth Daily.  Dispense: 90 tablet; Refill: 3               This note was electronically signed.    Leny Branham CNM   March 8, 2024

## 2024-05-06 ENCOUNTER — HOSPITAL ENCOUNTER (OUTPATIENT)
Dept: MAMMOGRAPHY | Facility: HOSPITAL | Age: 69
Discharge: HOME | End: 2024-05-06
Admitting: MIDWIFE
Payer: MEDICARE

## 2024-05-06 PROCEDURE — 77063 BREAST TOMOSYNTHESIS BI: CPT

## 2024-05-06 PROCEDURE — 77067 SCR MAMMO BI INCL CAD: CPT

## 2024-07-29 ENCOUNTER — OFFICE VISIT (OUTPATIENT)
Dept: CARDIOLOGY | Facility: CLINIC | Age: 69
End: 2024-07-29
Payer: MEDICARE

## 2024-07-29 ENCOUNTER — HOSPITAL ENCOUNTER (OUTPATIENT)
Dept: CARDIOLOGY | Facility: HOSPITAL | Age: 69
Discharge: HOME | End: 2024-07-29
Payer: MEDICARE

## 2024-07-29 VITALS
HEIGHT: 67 IN | SYSTOLIC BLOOD PRESSURE: 138 MMHG | WEIGHT: 268 LBS | DIASTOLIC BLOOD PRESSURE: 80 MMHG | HEART RATE: 71 BPM | OXYGEN SATURATION: 97 % | BODY MASS INDEX: 42.06 KG/M2

## 2024-07-29 VITALS — HEIGHT: 67 IN | WEIGHT: 268.96 LBS | BODY MASS INDEX: 42.21 KG/M2

## 2024-07-29 DIAGNOSIS — I48.0 PAROXYSMAL ATRIAL FIBRILLATION: ICD-10-CM

## 2024-07-29 DIAGNOSIS — M79.662 PAIN OF LEFT CALF: ICD-10-CM

## 2024-07-29 DIAGNOSIS — R60.0 BILATERAL LEG EDEMA: ICD-10-CM

## 2024-07-29 DIAGNOSIS — R60.0 BILATERAL LEG EDEMA: Primary | ICD-10-CM

## 2024-07-29 LAB
BH CV LOWER VASCULAR LEFT COMMON FEMORAL AUGMENT: NORMAL
BH CV LOWER VASCULAR LEFT COMMON FEMORAL COMPRESS: NORMAL
BH CV LOWER VASCULAR LEFT COMMON FEMORAL PHASIC: NORMAL
BH CV LOWER VASCULAR LEFT COMMON FEMORAL SPONT: NORMAL
BH CV LOWER VASCULAR LEFT DISTAL FEMORAL AUGMENT: NORMAL
BH CV LOWER VASCULAR LEFT DISTAL FEMORAL COMPRESS: NORMAL
BH CV LOWER VASCULAR LEFT DISTAL FEMORAL PHASIC: NORMAL
BH CV LOWER VASCULAR LEFT DISTAL FEMORAL SPONT: NORMAL
BH CV LOWER VASCULAR LEFT GASTRONEMIUS COMPRESS: NORMAL
BH CV LOWER VASCULAR LEFT GREATER SAPH AK COMPRESS: NORMAL
BH CV LOWER VASCULAR LEFT GREATER SAPH BK COMPRESS: NORMAL
BH CV LOWER VASCULAR LEFT LESSER SAPH COMPRESS: NORMAL
BH CV LOWER VASCULAR LEFT MID FEMORAL AUGMENT: NORMAL
BH CV LOWER VASCULAR LEFT MID FEMORAL COMPRESS: NORMAL
BH CV LOWER VASCULAR LEFT MID FEMORAL PHASIC: NORMAL
BH CV LOWER VASCULAR LEFT MID FEMORAL SPONT: NORMAL
BH CV LOWER VASCULAR LEFT PERONEAL COMPRESS: NORMAL
BH CV LOWER VASCULAR LEFT POPLITEAL AUGMENT: NORMAL
BH CV LOWER VASCULAR LEFT POPLITEAL COMPRESS: NORMAL
BH CV LOWER VASCULAR LEFT POPLITEAL PHASIC: NORMAL
BH CV LOWER VASCULAR LEFT POPLITEAL SPONT: NORMAL
BH CV LOWER VASCULAR LEFT POSTERIOR TIBIAL COMPRESS: NORMAL
BH CV LOWER VASCULAR LEFT PROFUNDA FEMORAL AUGMENT: NORMAL
BH CV LOWER VASCULAR LEFT PROFUNDA FEMORAL COMPRESS: NORMAL
BH CV LOWER VASCULAR LEFT PROFUNDA FEMORAL PHASIC: NORMAL
BH CV LOWER VASCULAR LEFT PROFUNDA FEMORAL SPONT: NORMAL
BH CV LOWER VASCULAR LEFT PROXIMAL FEMORAL AUGMENT: NORMAL
BH CV LOWER VASCULAR LEFT PROXIMAL FEMORAL COMPRESS: NORMAL
BH CV LOWER VASCULAR LEFT PROXIMAL FEMORAL PHASIC: NORMAL
BH CV LOWER VASCULAR LEFT PROXIMAL FEMORAL SPONT: NORMAL
BH CV LOWER VASCULAR LEFT SAPHENOFEMORAL JUNCTION AUGMENT: NORMAL
BH CV LOWER VASCULAR LEFT SAPHENOFEMORAL JUNCTION COMPRESS: NORMAL
BH CV LOWER VASCULAR LEFT SAPHENOFEMORAL JUNCTION PHASIC: NORMAL
BH CV LOWER VASCULAR LEFT SAPHENOFEMORAL JUNCTION SPONT: NORMAL
BH CV LOWER VASCULAR RIGHT COMMON FEMORAL AUGMENT: NORMAL
BH CV LOWER VASCULAR RIGHT COMMON FEMORAL COMPRESS: NORMAL
BH CV LOWER VASCULAR RIGHT COMMON FEMORAL PHASIC: NORMAL
BH CV LOWER VASCULAR RIGHT COMMON FEMORAL SPONT: NORMAL
BH CV LOWER VASCULAR RIGHT DISTAL FEMORAL AUGMENT: NORMAL
BH CV LOWER VASCULAR RIGHT DISTAL FEMORAL COMPRESS: NORMAL
BH CV LOWER VASCULAR RIGHT DISTAL FEMORAL PHASIC: NORMAL
BH CV LOWER VASCULAR RIGHT DISTAL FEMORAL SPONT: NORMAL
BH CV LOWER VASCULAR RIGHT GASTRONEMIUS COMPRESS: NORMAL
BH CV LOWER VASCULAR RIGHT GREATER SAPH AK COMPRESS: NORMAL
BH CV LOWER VASCULAR RIGHT GREATER SAPH BK COMPRESS: NORMAL
BH CV LOWER VASCULAR RIGHT LESSER SAPH COMPRESS: NORMAL
BH CV LOWER VASCULAR RIGHT MID FEMORAL AUGMENT: NORMAL
BH CV LOWER VASCULAR RIGHT MID FEMORAL COMPRESS: NORMAL
BH CV LOWER VASCULAR RIGHT MID FEMORAL PHASIC: NORMAL
BH CV LOWER VASCULAR RIGHT MID FEMORAL SPONT: NORMAL
BH CV LOWER VASCULAR RIGHT PERONEAL COMPRESS: NORMAL
BH CV LOWER VASCULAR RIGHT POPLITEAL AUGMENT: NORMAL
BH CV LOWER VASCULAR RIGHT POPLITEAL COMPRESS: NORMAL
BH CV LOWER VASCULAR RIGHT POPLITEAL PHASIC: NORMAL
BH CV LOWER VASCULAR RIGHT POPLITEAL SPONT: NORMAL
BH CV LOWER VASCULAR RIGHT POSTERIOR TIBIAL COMPRESS: NORMAL
BH CV LOWER VASCULAR RIGHT PROFUNDA FEMORAL AUGMENT: NORMAL
BH CV LOWER VASCULAR RIGHT PROFUNDA FEMORAL COMPRESS: NORMAL
BH CV LOWER VASCULAR RIGHT PROFUNDA FEMORAL PHASIC: NORMAL
BH CV LOWER VASCULAR RIGHT PROFUNDA FEMORAL SPONT: NORMAL
BH CV LOWER VASCULAR RIGHT PROXIMAL FEMORAL AUGMENT: NORMAL
BH CV LOWER VASCULAR RIGHT PROXIMAL FEMORAL COMPRESS: NORMAL
BH CV LOWER VASCULAR RIGHT PROXIMAL FEMORAL PHASIC: NORMAL
BH CV LOWER VASCULAR RIGHT PROXIMAL FEMORAL SPONT: NORMAL
BH CV LOWER VASCULAR RIGHT SAPHENOFEMORAL JUNCTION AUGMENT: NORMAL
BH CV LOWER VASCULAR RIGHT SAPHENOFEMORAL JUNCTION COMPRESS: NORMAL
BH CV LOWER VASCULAR RIGHT SAPHENOFEMORAL JUNCTION PHASIC: NORMAL
BH CV LOWER VASCULAR RIGHT SAPHENOFEMORAL JUNCTION SPONT: NORMAL

## 2024-07-29 PROCEDURE — 93970 EXTREMITY STUDY: CPT

## 2024-07-29 PROCEDURE — 93970 EXTREMITY STUDY: CPT | Performed by: INTERNAL MEDICINE

## 2024-07-29 NOTE — PROGRESS NOTES
[facility] Cardiology  Office Progress Note  Neetu Wick  1955  [address] [facility] [address] [facility] [address]       Visit Date: 07/29/24    PCP: Ines Hancock MD  [address]  [place] [facility] [address]    IDENTIFICATION: A 68 y.o. female   originally from [place]     PROBLEM LIST:   Paroxysmal atrial fibrillation, [id number] 3  2017-initial presentation.    2016 echo (OSH) EF wnl, no valvulopathy- DD  CAD   5/18 [facility] OSH-normal perfusion EF not reported  HL   11/23  [id number]  Type 2 diabetes mellitus-onset?  11/23 A1c 6.3  HTN  Bilat GSV (veins stripping) ablation OSH  9/21 RLE venous no DVT  Morbid obesity        CC:   Chief Complaint   Patient presents with    Paroxysmal atrial fibrillation       Allergies  Allergies   Allergen Reactions    Codeine Itching       Current Medications  Current Outpatient Medications   Medication Instructions    atorvastatin (LIPITOR) 20 mg, Oral, Nightly    buPROPion XL (WELLBUTRIN XL) 300 mg, Oral, Daily    clobetasol propionate (Temovate) 0.05 % cream 1 Application, Topical, 2 Times Daily    clotrimazole (LOTRIMIN) 1 % cream Topical, Daily    dilTIAZem CD (CARDIZEM CD) 180 mg, Oral, Every Night at Bedtime    estradiol (ESTRACE) 0.5 mg, Oral, Daily    Loratadine 10 mg, Oral, Daily    metoprolol succinate XL (TOPROL-XL) 25 mg, Oral, Daily    rivaroxaban (XARELTO) 20 mg, Oral, Nightly        History of Present Illness   Neetu Wick is a 68 y.o. year old female here for follow up.  She denies any symptoms suggesting recurrence of atrial fibrillation.  She does admit to missing an occasional dose of her Xarelto.  She reports lower extremity edema is typically well-controlled with the use of her compression stockings in the winter.  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  She has noted over the last 2 to 3 weeks increased edema on the left and pain noted in her calf.  She previously followed with   "Niki and was told to wear thigh-high compression stockings for 6 months continuously prior to any intervention being scheduled.  She has not had follow-up.    Pt denies any chest pain, dyspnea, dyspnea on exertion, orthopnea, PND, palpitations, or claudication.      OBJECTIVE:  Vitals:    07/29/24 1003   BP: 138/80   BP Location: Right arm   Patient Position: Sitting   Cuff Size: Adult   Pulse: 71   SpO2: 97%   Weight: 122 kg (268 lb)   Height: 170.2 cm (67\")     Body mass index is 41.97 kg/m².    Vitals reviewed.   Constitutional:       Appearance: Normal appearance. Obese.   Pulmonary:      Effort: Pulmonary effort is normal.      Breath sounds: Normal breath sounds.   Cardiovascular:      PMI at left midclavicular line. Normal rate. Regular rhythm. Normal S1. Normal S2.       Murmurs: There is no murmur.   Edema:     Peripheral edema present.     Comments: Left >Right, no redness or warmth appreciated  Skin:     General: Skin is warm and dry.   Neurological:      General: No focal deficit present.      Mental Status: Alert.   Psychiatric:         Behavior: Behavior is cooperative.         Diagnostic Data:  Procedures    Advance Care Planning   ACP discussion was declined by the patient. Patient does not have an advance directive, declines further assistance.         ASSESSMENT:   Diagnosis Plan   1. Bilateral leg edema  Duplex Venous Lower Extremity - Bilateral CAR      2. Pain of left calf  Duplex Venous Lower Extremity - Bilateral CAR      3. Paroxysmal atrial fibrillation            PLAN:  PAF, no recurrence.  On Xarelto, diltiazem, and metoprolol  -Continue metoprolol and Xarelto  -Diltiazem may be contributing to lower extremity edema    Bilateral lower extremity edema, left > right.  Pain noted in left calf with dorsiflexion.  DVT unlikely on Xarelto however she has missed doses.  -Will check bilateral lower extremity venous duplex to rule out DVT  -Advised to revisit Dr. Prince for further " recommendations for surgical intervention.  -Recommended use of compression stockings and elevation when seated.    Results will be called to her.    Follow up with Dr. Joe in 1 year, or sooner as needed. Encouraged to contact office with any questions or concerns.    LOBO Harmon 07/29/24 11:52 EDT

## 2024-07-31 ENCOUNTER — TELEPHONE (OUTPATIENT)
Dept: CARDIOLOGY | Facility: CLINIC | Age: 69
End: 2024-07-31
Payer: MEDICARE

## 2024-07-31 NOTE — TELEPHONE ENCOUNTER
Patient given results    Duplex negative for DVT  Review continuation of diltiazem with [facility]  She will schedule follow-up with vascular    Followed up with patient 8/12/2024-  We discussed conundrum of calcium channel blocker/PAF/lower extremity edema.  Discontinuing or decreasing diltiazem may result in recurrence of atrial fibrillation.  She has been maintaining sinus rhythm on current medication regimen which includes metoprolol and diltiazem.  Lower extremity edema consistent with chronic venous insufficiency but diltiazem could be contributing.  She will follow-up with her vascular specialist and attempt to locate her thigh-high compression stockings.  She has been wearing her knee-high stockings as tolerated.  She has been told in the past to wear thigh-high compression stockings for 6 months continuously prior to consideration for intervention.

## 2024-09-20 LAB
ALBUMIN SERPL-MCNC: 4 G/DL (ref 3.5–5.2)
ALBUMIN/CREAT UR: 18 MG/G CREAT (ref 0–29)
ALBUMIN/GLOB SERPL: 1.7 G/DL
ALP SERPL-CCNC: 113 U/L (ref 39–117)
ALT SERPL-CCNC: 14 U/L (ref 1–33)
AST SERPL-CCNC: 17 U/L (ref 1–32)
BASOPHILS # BLD AUTO: 0.05 10*3/MM3 (ref 0–0.2)
BASOPHILS NFR BLD AUTO: 1.2 % (ref 0–1.5)
BILIRUB SERPL-MCNC: 0.7 MG/DL (ref 0–1.2)
BUN SERPL-MCNC: 10 MG/DL (ref 8–23)
BUN/CREAT SERPL: 13.3 (ref 7–25)
CALCIUM SERPL-MCNC: 9 MG/DL (ref 8.6–10.5)
CHLORIDE SERPL-SCNC: 106 MMOL/L (ref 98–107)
CHOLEST SERPL-MCNC: 125 MG/DL (ref 0–200)
CO2 SERPL-SCNC: 25.4 MMOL/L (ref 22–29)
CREAT SERPL-MCNC: 0.75 MG/DL (ref 0.57–1)
CREAT UR-MCNC: 231.5 MG/DL
EGFRCR SERPLBLD CKD-EPI 2021: 85.2 ML/MIN/1.73
EOSINOPHIL # BLD AUTO: 0.19 10*3/MM3 (ref 0–0.4)
EOSINOPHIL NFR BLD AUTO: 4.7 % (ref 0.3–6.2)
ERYTHROCYTE [DISTWIDTH] IN BLOOD BY AUTOMATED COUNT: 12.4 % (ref 12.3–15.4)
GLOBULIN SER CALC-MCNC: 2.3 GM/DL
GLUCOSE SERPL-MCNC: 106 MG/DL (ref 65–99)
HBA1C MFR BLD: 6.3 % (ref 4.8–5.6)
HCT VFR BLD AUTO: 39 % (ref 34–46.6)
HDLC SERPL-MCNC: 55 MG/DL (ref 40–60)
HGB BLD-MCNC: 12.6 G/DL (ref 12–15.9)
IMM GRANULOCYTES # BLD AUTO: 0.01 10*3/MM3 (ref 0–0.05)
IMM GRANULOCYTES NFR BLD AUTO: 0.2 % (ref 0–0.5)
LDLC SERPL CALC-MCNC: 53 MG/DL (ref 0–100)
LYMPHOCYTES # BLD AUTO: 1.17 10*3/MM3 (ref 0.7–3.1)
LYMPHOCYTES NFR BLD AUTO: 28.7 % (ref 19.6–45.3)
MCH RBC QN AUTO: 31 PG (ref 26.6–33)
MCHC RBC AUTO-ENTMCNC: 32.3 G/DL (ref 31.5–35.7)
MCV RBC AUTO: 95.8 FL (ref 79–97)
MICROALBUMIN UR-MCNC: 41.9 UG/ML
MONOCYTES # BLD AUTO: 0.52 10*3/MM3 (ref 0.1–0.9)
MONOCYTES NFR BLD AUTO: 12.7 % (ref 5–12)
NEUTROPHILS # BLD AUTO: 2.14 10*3/MM3 (ref 1.7–7)
NEUTROPHILS NFR BLD AUTO: 52.5 % (ref 42.7–76)
NRBC BLD AUTO-RTO: 0 /100 WBC (ref 0–0.2)
PLATELET # BLD AUTO: 265 10*3/MM3 (ref 140–450)
POTASSIUM SERPL-SCNC: 4 MMOL/L (ref 3.5–5.2)
PROT SERPL-MCNC: 6.3 G/DL (ref 6–8.5)
RBC # BLD AUTO: 4.07 10*6/MM3 (ref 3.77–5.28)
SODIUM SERPL-SCNC: 140 MMOL/L (ref 136–145)
TRIGL SERPL-MCNC: 88 MG/DL (ref 0–150)
VLDLC SERPL CALC-MCNC: 17 MG/DL (ref 5–40)
WBC # BLD AUTO: 4.08 10*3/MM3 (ref 3.4–10.8)

## 2024-09-23 ENCOUNTER — OFFICE VISIT (OUTPATIENT)
Dept: INTERNAL MEDICINE | Facility: CLINIC | Age: 69
End: 2024-09-23
Payer: MEDICARE

## 2024-09-23 VITALS
OXYGEN SATURATION: 99 % | SYSTOLIC BLOOD PRESSURE: 166 MMHG | HEIGHT: 67 IN | BODY MASS INDEX: 42.06 KG/M2 | DIASTOLIC BLOOD PRESSURE: 74 MMHG | RESPIRATION RATE: 20 BRPM | WEIGHT: 268 LBS | HEART RATE: 62 BPM | TEMPERATURE: 97.5 F

## 2024-09-23 DIAGNOSIS — I10 BENIGN ESSENTIAL HYPERTENSION: Primary | ICD-10-CM

## 2024-09-23 DIAGNOSIS — F33.0 MILD EPISODE OF RECURRENT MAJOR DEPRESSIVE DISORDER: ICD-10-CM

## 2024-09-23 DIAGNOSIS — E11.9 CONTROLLED TYPE 2 DIABETES MELLITUS WITHOUT COMPLICATION, WITHOUT LONG-TERM CURRENT USE OF INSULIN: ICD-10-CM

## 2024-09-23 DIAGNOSIS — E78.2 MIXED HYPERLIPIDEMIA: ICD-10-CM

## 2024-09-23 DIAGNOSIS — I48.20 CHRONIC ATRIAL FIBRILLATION: ICD-10-CM

## 2024-09-23 DIAGNOSIS — D48.9 NEOPLASM OF UNCERTAIN BEHAVIOR: ICD-10-CM

## 2024-09-23 DIAGNOSIS — N39.46 MIXED STRESS AND URGE URINARY INCONTINENCE: ICD-10-CM

## 2024-09-23 PROCEDURE — G2211 COMPLEX E/M VISIT ADD ON: HCPCS | Performed by: INTERNAL MEDICINE

## 2024-09-23 PROCEDURE — 1159F MED LIST DOCD IN RCRD: CPT | Performed by: INTERNAL MEDICINE

## 2024-09-23 PROCEDURE — 99214 OFFICE O/P EST MOD 30 MIN: CPT | Performed by: INTERNAL MEDICINE

## 2024-09-23 PROCEDURE — 1160F RVW MEDS BY RX/DR IN RCRD: CPT | Performed by: INTERNAL MEDICINE

## 2024-09-23 PROCEDURE — 1126F AMNT PAIN NOTED NONE PRSNT: CPT | Performed by: INTERNAL MEDICINE

## 2024-09-23 PROCEDURE — 3044F HG A1C LEVEL LT 7.0%: CPT | Performed by: INTERNAL MEDICINE

## 2024-09-23 RX ORDER — BUPROPION HYDROCHLORIDE 300 MG/1
300 TABLET ORAL DAILY
Qty: 90 TABLET | Refills: 1 | Status: SHIPPED | OUTPATIENT
Start: 2024-09-23

## 2024-09-23 RX ORDER — OXYBUTYNIN CHLORIDE 5 MG/1
5 TABLET ORAL 2 TIMES DAILY
Qty: 60 TABLET | Refills: 3 | Status: SHIPPED | OUTPATIENT
Start: 2024-09-23

## 2024-09-23 RX ORDER — ATORVASTATIN CALCIUM 20 MG/1
20 TABLET, FILM COATED ORAL NIGHTLY
Qty: 90 TABLET | Refills: 1 | Status: SHIPPED | OUTPATIENT
Start: 2024-09-23

## 2024-09-23 RX ORDER — DILTIAZEM HYDROCHLORIDE 180 MG/1
180 CAPSULE, COATED, EXTENDED RELEASE ORAL
Qty: 90 CAPSULE | Refills: 1 | Status: SHIPPED | OUTPATIENT
Start: 2024-09-23

## 2024-09-23 RX ORDER — METOPROLOL SUCCINATE 25 MG/1
25 TABLET, EXTENDED RELEASE ORAL DAILY
Qty: 90 TABLET | Refills: 1 | Status: SHIPPED | OUTPATIENT
Start: 2024-09-23

## 2024-10-09 ENCOUNTER — OFFICE VISIT (OUTPATIENT)
Dept: INTERNAL MEDICINE | Facility: CLINIC | Age: 69
End: 2024-10-09
Payer: MEDICARE

## 2024-10-09 VITALS
BODY MASS INDEX: 41.44 KG/M2 | OXYGEN SATURATION: 96 % | HEIGHT: 67 IN | SYSTOLIC BLOOD PRESSURE: 145 MMHG | HEART RATE: 68 BPM | TEMPERATURE: 97.3 F | DIASTOLIC BLOOD PRESSURE: 79 MMHG | WEIGHT: 264 LBS | RESPIRATION RATE: 20 BRPM

## 2024-10-09 DIAGNOSIS — Z78.0 MENOPAUSE: ICD-10-CM

## 2024-10-09 DIAGNOSIS — Z12.11 COLON CANCER SCREENING: ICD-10-CM

## 2024-10-09 DIAGNOSIS — Z13.820 ENCOUNTER FOR SCREENING FOR OSTEOPOROSIS: ICD-10-CM

## 2024-10-09 DIAGNOSIS — Z91.81 AT MODERATE RISK FOR FALL: ICD-10-CM

## 2024-10-09 DIAGNOSIS — Z00.00 MEDICARE ANNUAL WELLNESS VISIT, SUBSEQUENT: Primary | ICD-10-CM

## 2024-10-09 PROCEDURE — 99397 PER PM REEVAL EST PAT 65+ YR: CPT | Performed by: INTERNAL MEDICINE

## 2024-10-09 PROCEDURE — 1126F AMNT PAIN NOTED NONE PRSNT: CPT | Performed by: INTERNAL MEDICINE

## 2024-10-09 PROCEDURE — 3044F HG A1C LEVEL LT 7.0%: CPT | Performed by: INTERNAL MEDICINE

## 2024-10-09 PROCEDURE — 96160 PT-FOCUSED HLTH RISK ASSMT: CPT | Performed by: INTERNAL MEDICINE

## 2024-10-09 PROCEDURE — G0439 PPPS, SUBSEQ VISIT: HCPCS | Performed by: INTERNAL MEDICINE

## 2024-10-09 PROCEDURE — 1160F RVW MEDS BY RX/DR IN RCRD: CPT | Performed by: INTERNAL MEDICINE

## 2024-10-09 PROCEDURE — 1170F FXNL STATUS ASSESSED: CPT | Performed by: INTERNAL MEDICINE

## 2024-10-09 PROCEDURE — 1159F MED LIST DOCD IN RCRD: CPT | Performed by: INTERNAL MEDICINE

## 2024-10-09 NOTE — PROGRESS NOTES
Subjective   The ABCs of the Annual Wellness Visit  Medicare Wellness Visit    Chief Complaint   Patient presents with    Medicare Wellness-subsequent       Neetu Wick is a 69 y.o. patient who presents for a Medicare Wellness Visit and physical  and humana paf  . She is due dexa scan and colonoscopy    The following portions of the patient's history were reviewed and   updated as appropriate: allergies, current medications, past family history, past medical history, past social history, past surgical history, and problem list.    Compared to one year ago, the patient's physical   health is the same.  Compared to one year ago, the patient's mental   health is the same.    Recent Hospitalizations:  She was not admitted to the hospital during the last year.     Current Medical Providers:  Patient Care Team:  Ines Hancock MD as PCP - General (Internal Medicine)  Pipo Joe MD as Consulting Physician (Cardiology)  Leny Branham CNM as Midwife (Certified Nurse Midwife)    Outpatient Medications Prior to Visit   Medication Sig Dispense Refill    atorvastatin (LIPITOR) 20 MG tablet Take 1 tablet by mouth Every Night. 90 tablet 1    buPROPion XL (WELLBUTRIN XL) 300 MG 24 hr tablet Take 1 tablet by mouth Daily. 90 tablet 1    clobetasol propionate (Temovate) 0.05 % cream Apply 1 Application topically to the appropriate area as directed 2 (Two) Times a Day. 15 g 3    clotrimazole (LOTRIMIN) 1 % cream Apply  topically to the appropriate area as directed Daily.      dilTIAZem CD (CARDIZEM CD) 180 MG 24 hr capsule Take 1 capsule by mouth every night at bedtime. 90 capsule 1    estradiol (ESTRACE) 0.5 MG tablet Take 1 tablet by mouth Daily. 90 tablet 3    Loratadine 10 MG capsule Take 1 capsule by mouth Daily.      metoprolol succinate XL (TOPROL-XL) 25 MG 24 hr tablet Take 1 tablet by mouth Daily. 90 tablet 1    oxybutynin (DITROPAN) 5 MG tablet Take 1 tablet by mouth 2 (Two) Times a Day. 60 tablet 3     "rivaroxaban (Xarelto) 20 MG tablet Take 1 tablet by mouth Every Night. 90 tablet 1     No facility-administered medications prior to visit.     No opioid medication identified on active medication list. I have reviewed chart for other potential  high risk medication/s and harmful drug interactions in the elderly.      Aspirin is not on active medication list.  Aspirin use is not indicated based on review of current medical condition/s. Risk of harm outweighs potential benefits.  .    Patient Active Problem List   Diagnosis    Personal history of colonic polyps    Bilateral leg edema    Pain of left calf    Paroxysmal atrial fibrillation     Advance Care Planning Advance Directive is not on file.  ACP discussion was held with the patient during this visit. Patient does not have an advance directive, declines further assistance.            Objective   Vitals:    10/09/24 1150   BP: 145/79   Pulse: 68   Resp: 20   Temp: 97.3 °F (36.3 °C)   SpO2: 96%   Weight: 120 kg (264 lb)   Height: 170.2 cm (67.01\")   PainSc: 0-No pain       Estimated body mass index is 41.34 kg/m² as calculated from the following:    Height as of this encounter: 170.2 cm (67.01\").    Weight as of this encounter: 120 kg (264 lb).          Physical Examination  Vitals and nursing note reviewed  General appearance: Normocephalic and nontraumatic ,obese   HEENT: External inspection of eyes, ears and nose appears benign, hearing appears intact  Neck: Neck appears supple, trachea in midline, thyroid appears not enlarged  Respiratory: Respiratory effort appears normal  Musculoskeletal: Moving all limbs,   Range of motion of visible joints appears within normal  CNS: No gross motor or sensory deficits  No gross cranial nerve deficits  No tremors  Psychiatry: Alert and oriented x3  Memory appears intact  Mood and affect appears normal  Insight appears normal       Does the patient have evidence of cognitive impairment? No  Lab Results   Component Value Date "    CHLPL 125 2024    TRIG 88 2024    HDL 55 2024    LDL 53 2024    VLDL 17 2024    HGBA1C 6.30 (H) 2024                                                                                               Health  Risk Assessment    Smoking Status:  Social History     Tobacco Use   Smoking Status Former    Current packs/day: 0.00    Types: Cigarettes    Start date: 1970    Quit date: 1981    Years since quittin.8    Passive exposure: Past   Smokeless Tobacco Never   Tobacco Comments    only smoked occasionally, not daily     Alcohol Consumption:  Social History     Substance and Sexual Activity   Alcohol Use Not Currently    Comment: 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 Assessment completed on:10/9/2024    Depression Screening:      10/9/2024    11:58 AM   PHQ-2/PHQ-9 Depression Screening   Little Interest or Pleasure in Doing Things 0-->not at all   Feeling Down, Depressed or Hopeless 0-->not at all   PHQ-9: Brief Depression Severity Measure Score 0     Health Habits and Functional and Cognitive Screening:      10/9/2024    11:56 AM   Functional & Cognitive Status   Do you have difficulty preparing food and eating? No   Do you have difficulty bathing yourself, getting dressed or grooming yourself? No   Do you have difficulty using the toilet? No   Do you have difficulty moving around from place to place? No   Do you have trouble with steps or getting out of a bed or a chair? No   Current Diet Well Balanced Diet   Dental Exam Up to date   Eye Exam Not up to date   Exercise (times per week) 4 times per week   Current Exercises Include Walking;Other;House Cleaning   Do you need help using the phone?  No   Are you deaf or do you have serious difficulty hearing?  No   Do you need help to go to places out of walking distance? No   Do you need help shopping? No   Do you need help  preparing meals?  No   Do you need help with housework?  No   Do you need help with laundry? No   Do you need help taking your medications? No   Do you need help managing money? No   Do you ever drive or ride in a car without wearing a seat belt? No   Have you felt unusual stress, anger or loneliness in the last month? No   Who do you live with? Alone   If you need help, do you have trouble finding someone available to you? No   Have you been bothered in the last four weeks by sexual problems? No   Do you have difficulty concentrating, remembering or making decisions? No           Age-appropriate Screening Schedule:  Refer to the list below for future screening recommendations based on patient's age, sex and/or medical conditions. Orders for these recommended tests are listed in the plan section. The patient has been provided with a written plan.    Health Maintenance List  Health Maintenance   Topic Date Due    DIABETIC EYE EXAM  Never done    HEPATITIS C SCREENING  Never done    DIABETIC FOOT EXAM  Never done    DXA SCAN  07/26/2023    COLORECTAL CANCER SCREENING  12/03/2024    HEMOGLOBIN A1C  03/19/2025    LIPID PANEL  09/19/2025    URINE MICROALBUMIN  09/19/2025    BMI FOLLOWUP  09/23/2025    ANNUAL WELLNESS VISIT  10/09/2025    MAMMOGRAM  05/06/2026    TDAP/TD VACCINES (3 - Td or Tdap) 08/15/2026    COVID-19 Vaccine  Completed    INFLUENZA VACCINE  Completed    Pneumococcal Vaccine 65+  Completed    ZOSTER VACCINE  Completed                                                                                                                                                CMS Preventative Services Quick Reference  Risk Factors Identified During Encounter  Immunizations Discussed/Encouraged: RSV (Respiratory Syncytial Virus)    The above risks/problems have been discussed with the patient.  Pertinent information has been shared with the patient in the After Visit Summary.  An After Visit Summary and PPPS were made  available to the patient.    Follow Up:   Next Medicare Wellness visit to be scheduled in 1 year.     Assessment & Plan  Medicare annual wellness visit, subsequent  Plan:  1.physical: Physical exam conducted today, reviewed fasting lab work,   Impression: healthy adult Patient. Currently, patient eats a healthy diet. Screening lab work includes glucose, lipid profile and complete blood count . The risks and benefits of immunizations were discussed and immunizations are up to date- pending rsv . Advice and education were given regarding nutrition and aerobic exercise. Patient discussion: discussed with the patient.  Annual Wellness Visit  ,  physical  and humana paf  with preventive exam as well as age and risk appropriate counseling completed.   Advance Directive Planning: paperwork and instructions were given to the patient.   Patient Discussion: plan discussed with the patient, follow-up visit needed in one year. Medication Review and medication list updated    Encounter for screening for osteoporosis  Order dexa scan  Menopause  Order dexa scan  At moderate risk for fall  Fall precautions advised  Colon cancer screening  Refer to GI for colonoscopy    Orders Placed This Encounter   Procedures    DEXA Bone Density Axial    Ambulatory Referral to Gastroenterology             Follow Up:   Return in about 1 year (around 10/9/2025) for Medicare Wellness.

## 2024-10-09 NOTE — PATIENT INSTRUCTIONS
Advance Care Planning and Advance Directives     You make decisions on a daily basis - decisions about where you want to live, your career, your home, your life. Perhaps one of the most important decisions you face is your choice for future medical care. Take time to talk with your family and your healthcare team and start planning today.  Advance Care Planning is a process that can help you:  Understand possible future healthcare decisions in light of your own experiences  Reflect on those decision in light of your goals and values  Discuss your decisions with those closest to you and the healthcare professionals that care for you  Make a plan by creating a document that reflects your wishes    Surrogate Decision Maker  In the event of a medical emergency, which has left you unable to communicate or to make your own decisions, you would need someone to make decisions for you.  It is important to discuss your preferences for medical treatment with this person while you are in good health.     Qualities of a surrogate decision maker:  Willing to take on this role and responsibility  Knows what you want for future medical care  Willing to follow your wishes even if they don't agree with them  Able to make difficult medical decisions under stressful circumstances    Advance Directives  These are legal documents you can create that will guide your healthcare team and decision maker(s) when needed. These documents can be stored in the electronic medical record.    Living Will - a legal document to guide your care if you have a terminal condition or a serious illness and are unable to communicate. States vary by statute in document names/types, but most forms may include one or more of the following:        -  Directions regarding life-prolonging treatments        -  Directions regarding artificially provided nutrition/hydration        -  Choosing a healthcare decision maker        -  Direction regarding organ/tissue  donation    Durable Power of  for Healthcare - this document names an -in-fact to make medical decisions for you, but it may also allow this person to make personal and financial decisions for you. Please seek the advice of an  if you need this type of document.    **Advance Directives are not required and no one may discriminate against you if you do not sign one.    Medical Orders  Many states allow specific forms/orders signed by your physician to record your wishes for medical treatment in your current state of health. This form, signed in personal communication with your physician, addresses resuscitation and other medical interventions that you may or may not want.      For more information or to schedule a time with a [facility] Advance Care Planning Facilitator contact: [facility]/ACP or call [phone] and someone will contact you directly.  Fall Prevention in the Home, Adult  Falls can cause injuries and affect people of all ages. There are many simple things that you can do to make your home safe and to help prevent falls.  If you need it, ask for help making these changes.  What actions can I take to prevent falls?  General information  Use good lighting in all rooms. Make sure to:  Replace any light bulbs that burn out.  Turn on lights if it is dark and use night-lights.  Keep items that you use often in easy-to-reach places. Lower the shelves around your home if needed.  Move furniture so that there are clear paths around it.  Do not keep throw rugs or other things on the floor that can make you trip.  If any of your floors are uneven, fix them.  Add color or contrast paint or tape to clearly radha and help you see:  Grab bars or handrails.  First and last steps of staircases.  Where the edge of each step is.  If you use a ladder or stepladder:  Make sure that it is fully opened. Do not climb a closed ladder.  Make sure the sides of the ladder are locked in  place.  Have someone hold the ladder while you use it.  Know where your pets are as you move through your home.  What can I do in the bathroom?         Keep the floor dry. Clean up any water that is on the floor right away.  Remove soap buildup in the bathtub or shower. Buildup makes bathtubs and showers slippery.  Use non-skid mats or decals on the floor of the bathtub or shower.  Attach bath mats securely with double-sided, non-slip rug tape.  If you need to sit down while you are in the shower, use a non-slip stool.  Install grab bars by the toilet and in the bathtub and shower. Do not use towel bars as grab bars.  What can I do in the bedroom?  Make sure that you have a light by your bed that is easy to reach.  Do not use any sheets or blankets on your bed that hang to the floor.  Have a firm bench or chair with side arms that you can use for support when you get dressed.  What can I do in the kitchen?  Clean up any spills right away.  If you need to reach something above you, use a sturdy step stool that has a grab bar.  Keep electrical cables out of the way.  Do not use floor polish or wax that makes floors slippery.  What can I do with my stairs?  Do not leave anything on the stairs.  Make sure that you have a light switch at the top and the bottom of the stairs. Have them installed if you do not have them.  Make sure that there are handrails on both sides of the stairs. Fix handrails that are broken or loose. Make sure that handrails are as long as the staircases.  Install non-slip stair treads on all stairs in your home if they do not have carpet.  Avoid having throw rugs at the top or bottom of stairs, or secure the rugs with carpet tape to prevent them from moving.  Choose a carpet design that does not hide the edge of steps on the stairs. Make sure that carpet is firmly attached to the stairs. Fix any carpet that is loose or worn.  What can I do on the outside of my home?  Use bright outdoor  lighting.  Repair the edges of walkways and driveways and fix any cracks. Clear paths of anything that can make you trip, such as tools or rocks.  Add color or contrast paint or tape to clearly radha and help you see high doorway thresholds.  Trim any bushes or trees on the main path into your home.  Check that handrails are securely fastened and in good repair. Both sides of all steps should have handrails.  Install guardrails along the edges of any raised decks or porches.  Have leaves, snow, and ice cleared regularly. Use sand, salt, or ice melt on walkways during winter months if you live where there is ice and snow.  In the garage, clean up any spills right away, including grease or oil spills.  What other actions can I take?  Review your medicines with your health care provider. Some medicines can make you confused or feel dizzy. This can increase your chance of falling.  Wear closed-toe shoes that fit well and support your feet. Wear shoes that have rubber soles and low heels.  Use a cane, walker, scooter, or crutches that help you move around if needed.  Talk with your provider about other ways that you can decrease your risk of falls. This may include seeing a physical therapist to learn to do exercises to improve movement and strength.  Where to find more information  Centers for Disease Control and PreventionLB: cdc.gov  National [place] on Aging: sharla.nih.gov  National [place] on Aging: sharla.nih.gov  Contact a health care provider if:  You are afraid of falling at home.  You feel weak, drowsy, or dizzy at home.  You fall at home.  Get help right away if you:  Lose consciousness or have trouble moving after a fall.  Have a fall that causes a head injury.  These symptoms may be an emergency. Get help right away. Call 911.  Do not wait to see if the symptoms will go away.  Do not drive yourself to the hospital.  This information is not intended to replace advice given to you by your health care  provider. Make sure you discuss any questions you have with your health care provider.  Document Revised: 08/21/2023 Document Reviewed: 08/21/2023  Elsevier Patient Education © 2024 [employer]    Sit-to-Stand Exercise    The sit-to-stand exercise (also known as the chair stand or chair rise exercise) strengthens your lower body and helps you maintain or improve your mobility and independence. The end goal is to do the sit-to-stand exercise without using your hands. This will be easier as you become stronger. You should always talk with your health care provider before starting any exercise program, especially if you have had recent surgery.  Do the exercise exactly as told by your health care provider and adjust it as directed. It is normal to feel mild stretching, pulling, tightness, or discomfort as you do this exercise, but you should stop right away if you feel sudden pain or your pain gets worse. Do not begin doing this exercise until told by your health care provider.  What the sit-to-stand exercise does  The sit-to-stand exercise helps to strengthen the muscles in your thighs and the muscles in the center of your body that give you stability (core muscles). This exercise is especially helpful if:  You have had knee or hip surgery.  You have trouble getting up from a chair, out of a car, or off the toilet due to muscle weakness.  How to do the sit-to-stand exercise  Sit toward the front edge of a sturdy chair without armrests. Your knees should be bent and your feet should be flat on the floor and shoulder-width apart and underneath your hips.  Place your hands lightly on each side of the seat. Keep your back and neck as straight as possible, with your chest slightly forward.  Breathe in slowly. Lean forward and slightly shift your weight to the front of your feet.  Breathe out as you slowly stand up. Try not to support any weight with your hands.  Stand and pause for a full breath in and out.  Breathe in  as you sit down slowly. Tighten your core and abdominal muscles to control your lowering as much as possible. You should lower yourself back to the chair slowly, not just drop back into the seat.  Breathe out slowly.  Do this exercise 10-15 times. If needed, do it fewer times until you build up strength.  Rest for 1 minute, then do another set of 10-15 repetitions.  To change the difficulty of the sit-to-stand exercise  If the exercise is too difficult, use a chair with sturdy armrests, and push off the armrests to help you come to the standing position. You can also use the armrests to help slowly lower yourself back to sitting. As this gets easier, try to use your arms less. You can also place a firm cushion or pillow on the chair to make the surface higher.  If this exercise is too easy, do not use your arms to help raise or lower yourself. You can also wear a weighted vest, use hand weights, increase your repetitions, or try a lower chair.  General tips  You may feel tired when starting an exercise routine. This is normal.  You may have muscle soreness that lasts a few days. This is normal. As you get stronger, you may not feel muscle soreness.  Use smooth, steady movements.  Do not  hold your breath during strength exercises. This can cause unsafe changes in your blood pressure.  Breathe in slowly through your nose, and breathe out slowly through your mouth.  Summary  Strengthening your lower body is an important step to help you move safely and independently.  The sit-to-stand exercise helps strengthen the muscles in your thighs and core.  You should always talk with your health care provider before starting any exercise program, especially if you have had recent surgery.  This information is not intended to replace advice given to you by your health care provider. Make sure you discuss any questions you have with your health care provider.  Document Revised: 04/10/2022 Document Reviewed: 04/10/2022  [employer]  Patient Education 2024 Elsevier Inc.      Medicare Wellness  Personal Prevention Plan of Service     Date of Office Visit:    Encounter Provider:  Ines Hancock MD  Place of Service:  [facility]  Patient Name: Neetu Wick  :  1955    As part of the Medicare Wellness portion of your visit today, we are providing you with this personalized preventive plan of services (PPPS). This plan is based upon recommendations of the [place] Preventive Services Task Force (USPSTF) and the Advisory Committee on Immunization Practices (ACIP).    This lists the preventive care services that should be considered, and provides dates of when you are due. Items listed as completed are up-to-date and do not require any further intervention.    Health Maintenance   Topic Date Due   • DIABETIC EYE EXAM  Never done   • HEPATITIS C SCREENING  Never done   • DIABETIC FOOT EXAM  Never done   • DXA SCAN  2023   • ANNUAL WELLNESS VISIT  2024   • COLORECTAL CANCER SCREENING  2024   • HEMOGLOBIN A1C  2025   • LIPID PANEL  2025   • URINE MICROALBUMIN  2025   • BMI FOLLOWUP  2025   • MAMMOGRAM  2026   • TDAP/TD VACCINES (3 - Td or Tdap) 08/15/2026   • COVID-19 Vaccine  Completed   • INFLUENZA VACCINE  Completed   • Pneumococcal Vaccine 65+  Completed   • ZOSTER VACCINE  Completed       No orders of the defined types were placed in this encounter.      No follow-ups on file.

## 2024-11-04 ENCOUNTER — TELEPHONE (OUTPATIENT)
Dept: INTERNAL MEDICINE | Facility: CLINIC | Age: 69
End: 2024-11-04
Payer: MEDICARE

## 2024-11-04 NOTE — TELEPHONE ENCOUNTER
Caller: Neetu Wick    Relationship: Self    Best call back number: [phone]     What form or medical record are you requesting: WRITTEN PERMISSION TO PARTICIPATE IN A STUDY AT     Who is requesting this form or medical record from you:     How would you like to receive the form or medical records (pick-up, mail, fax):   If fax, what is the fax number:   If mail, what is the address:   If pick-up, provide patient with address and location details    Timeframe paperwork needed: AS SOON AS POSSIBLE    Additional notes: PLEASE GIVE THE PATIENT A CALL SO SHE CAN RELAY MORE INFORMATION.

## 2024-11-07 ENCOUNTER — PATIENT MESSAGE (OUTPATIENT)
Dept: CARDIOLOGY | Facility: CLINIC | Age: 69
End: 2024-11-07
Payer: MEDICARE

## 2024-12-11 ENCOUNTER — APPOINTMENT (OUTPATIENT)
Dept: BONE DENSITY | Facility: HOSPITAL | Age: 69
End: 2024-12-11
Payer: MEDICARE

## 2024-12-11 PROCEDURE — 77080 DXA BONE DENSITY AXIAL: CPT

## 2024-12-19 ENCOUNTER — OFFICE VISIT (OUTPATIENT)
Dept: GASTROENTEROLOGY | Facility: CLINIC | Age: 69
End: 2024-12-19
Payer: MEDICARE

## 2024-12-19 VITALS
TEMPERATURE: 98 F | BODY MASS INDEX: 42.53 KG/M2 | HEART RATE: 61 BPM | SYSTOLIC BLOOD PRESSURE: 152 MMHG | DIASTOLIC BLOOD PRESSURE: 66 MMHG | HEIGHT: 67 IN | OXYGEN SATURATION: 96 % | WEIGHT: 271 LBS

## 2024-12-19 DIAGNOSIS — E66.01 MORBID OBESITY WITH BMI OF 40.0-44.9, ADULT: ICD-10-CM

## 2024-12-19 DIAGNOSIS — Z86.0101 PERSONAL HISTORY OF ADENOMATOUS AND SERRATED COLON POLYPS: Primary | ICD-10-CM

## 2024-12-19 DIAGNOSIS — R15.2 FECAL URGENCY: ICD-10-CM

## 2024-12-19 DIAGNOSIS — R19.4 CHANGE IN BOWEL HABITS: ICD-10-CM

## 2024-12-19 RX ORDER — SODIUM, POTASSIUM,MAG SULFATES 17.5-3.13G
2 SOLUTION, RECONSTITUTED, ORAL ORAL ONCE
Qty: 354 ML | Refills: 0 | Status: SHIPPED | OUTPATIENT
Start: 2024-12-19 | End: 2024-12-19

## 2024-12-19 RX ORDER — ALENDRONATE SODIUM 70 MG/1
70 TABLET ORAL
Qty: 4 TABLET | Refills: 4 | Status: SHIPPED | OUTPATIENT
Start: 2024-12-19 | End: 2024-12-23 | Stop reason: SDUPTHER

## 2024-12-19 NOTE — PROGRESS NOTES
Follow Up Note     Date: 2024   Patient Name: Neetu Wick  MRN: [id number]  : 1955     Referring Physician: Ines Hancock MD    Chief Complaint:    Chief Complaint   Patient presents with    Colon Polyps       Interval History:   2024  Neetu Wick is a 69 y.o. female who is here today for follow up for to schedule her surveillance colonoscopy.  She states that she was started on Ozempic last year since then her whole bowel pattern changed.  She was getting significant fecal urgency loose stools and she could not tolerate the symptoms and stopped after 3 months.  Even after stopping the medication she continued to have a symptoms.  Most of the time bowel movement regular now except occasional fecal urgency.  She had multiple polyps removed in  she is here to schedule colonoscopy.    2021  This patient deny any abdominal pain, change in bowel habit, hematochezia or melena.  Weight is stable. Pt denies nausea vomiting or odynophagia or dysphagia. There is no history of acid reflux. There is no history of anemia. No prior history of EGD.  Her last colonoscopy was in  at [place] and polyps removed as per patient.  She was been told it was tubular adenoma. No family history of colon cancer or any GI malignancy. No history of any abdominal surgery except hysterectomy and bilateral salpingo-oophorectomy. Denies alcohol abuse or cigarette smoking. Maternal grandmother had breast CA, Aunt lung CA.      Patient has a chronic atrial fibrillation and she is on Xarelto.    Subjective      Past Medical History:   Past Medical History:   Diagnosis Date    Afib     Colon polyp 2021    Depression     Dyslipidemia     Ear piercing     Fracture     right ankle    Fracture, radius 2015    left    H/O echocardiogram     2016    History of nuclear stress test     2016    Hypertension     Incontinence     Murmur     Obesity     Ovarian failure     Panic attacks     Pedal edema      PONV (postoperative nausea and vomiting)     Seasonal allergies     Sleep apnea     Does not wear machine.    Urinary tract infection     Varicose vein of leg     Venous insufficiency     Vitamin D deficiency      Past Surgical History:   Past Surgical History:   Procedure Laterality Date    ABDOMINAL HYSTERECTOMY      Total    BILATERAL SALPINGO OOPHORECTOMY  1998    BLADDER SUSPENSION  2009    COLONOSCOPY  2015    COLONOSCOPY N/A 2021    Procedure: COLONOSCOPY with polypectomy x9;  Surgeon: Adelso Plummer MD;  Location: [facility] ENDOSCOPY;  Service: Gastroenterology;  Laterality: N/A;    ENDOSCOPY      ENDOVENOUS ABLATION SAPHENOUS VEIN W/ LASER Bilateral     OOPHORECTOMY Bilateral     ORIF ANKLE FRACTURE Right     ORIF WRIST FRACTURE Left     unknown of surgeon    WISDOM TOOTH EXTRACTION         Family History:   Family History   Problem Relation Age of Onset    Hypertension Mother     Obesity Mother     Arthritis Mother     Hypertension Father     Dementia Father     Atrial fibrillation Father     Obesity Sister     Heart disease Sister     Hypertension Sister     Stroke Sister     Thyroid disease Sister     Cancer Maternal Grandmother         breast    Breast cancer Maternal Grandmother 80    Cancer Maternal Aunt         breast    Breast cancer Maternal Aunt         DX AGE MID 30'S    Colon cancer Neg Hx     Ovarian cancer Neg Hx        Social History:   Social History     Socioeconomic History    Marital status:    Tobacco Use    Smoking status: Former     Current packs/day: 0.00     Types: Cigarettes     Start date: 1970     Quit date: 1981     Years since quittin.9     Passive exposure: Past    Smokeless tobacco: Never    Tobacco comments:     only smoked occasionally, not daily   Vaping Use    Vaping status: Never Used   Substance and Sexual Activity    Alcohol use: Not Currently     Comment: 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    Drug use: Never    Sexual activity:  Defer       Medications:     Current Outpatient Medications:     atorvastatin (LIPITOR) 20 MG tablet, Take 1 tablet by mouth Every Night., Disp: 90 tablet, Rfl: 1    buPROPion XL (WELLBUTRIN XL) 300 MG 24 hr tablet, Take 1 tablet by mouth Daily., Disp: 90 tablet, Rfl: 1    clobetasol propionate (Temovate) 0.05 % cream, Apply 1 Application topically to the appropriate area as directed 2 (Two) Times a Day., Disp: 15 g, Rfl: 3    clotrimazole (LOTRIMIN) 1 % cream, Apply  topically to the appropriate area as directed Daily., Disp: , Rfl:     dilTIAZem CD (CARDIZEM CD) 180 MG 24 hr capsule, Take 1 capsule by mouth every night at bedtime., Disp: 90 capsule, Rfl: 1    estradiol (ESTRACE) 0.5 MG tablet, Take 1 tablet by mouth Daily., Disp: 90 tablet, Rfl: 3    Loratadine 10 MG capsule, Take 1 capsule by mouth Daily., Disp: , Rfl:     metoprolol succinate XL (TOPROL-XL) 25 MG 24 hr tablet, Take 1 tablet by mouth Daily., Disp: 90 tablet, Rfl: 1    rivaroxaban (Xarelto) 20 MG tablet, Take 1 tablet by mouth Every Night., Disp: 90 tablet, Rfl: 1    Allergies:   Allergies   Allergen Reactions    Ozempic (0.25 Or 0.5 Mg-Dose) [Semaglutide(0.25 Or 0.5mg-Dos)] Other (See Comments)     Diarrhea, nausea    Codeine Itching       Review of Systems:   Review of Systems   Constitutional:  Positive for fatigue. Negative for appetite change, fever and unexpected weight loss.   HENT:  Negative for trouble swallowing.    Gastrointestinal:  Positive for abdominal pain and diarrhea (dumping syndrome type thing after she eats). Negative for abdominal distention, anal bleeding, blood in stool, constipation, nausea, rectal pain, vomiting, GERD and indigestion.       The following portions of the patient's history were reviewed and updated as appropriate: allergies, current medications, past family history, past medical history, past social history, past surgical history and problem list.    Objective     Physical Exam:  Vital Signs:   Vitals:     "12/19/24 1531   BP: 152/66   Pulse: 61   Temp: 98 °F (36.7 °C)   TempSrc: Infrared   SpO2: 96%   Weight: 123 kg (271 lb)   Height: 170.2 cm (67\")       Physical Exam  Constitutional:       Appearance: She is obese.   HENT:      Head: Normocephalic and atraumatic.   Eyes:      Conjunctiva/sclera: Conjunctivae normal.   Abdominal:      General: Abdomen is flat. There is no distension.      Palpations: There is no mass.      Tenderness: There is no abdominal tenderness. There is no guarding or rebound.      Hernia: No hernia is present.   Musculoskeletal:      Cervical back: Normal range of motion and neck supple.   Neurological:      Mental Status: She is alert.         Results Review:   I reviewed the patient's new clinical results.    No visits with results within 90 Day(s) from this visit.   Latest known visit with results is:   Hospital Outpatient Visit on 07/29/2024   Component Date Value Ref Range Status    Right Common Femoral Spont 07/29/2024 Y   Final    Right Common Femoral Phasic 07/29/2024 Y   Final    Right Common Femoral Compress 07/29/2024 C   Final    Right Common Femoral Augment 07/29/2024 Y   Final    Right Saphenofemoral Junction Spont 07/29/2024 Y   Final    Right Saphenofemoral Junction Phas* 07/29/2024 Y   Final    Right Saphenofemoral Junction Comp* 07/29/2024 C   Final    Right Saphenofemoral Junction Augm* 07/29/2024 Y   Final    Right Profunda Femoral Spont 07/29/2024 Y   Final    Right Profunda Femoral Phasic 07/29/2024 Y   Final    Right Profunda Femoral Compress 07/29/2024 C   Final    Right Profunda Femoral Augment 07/29/2024 Y   Final    Right Proximal Femoral Spont 07/29/2024 Y   Final    Right Proximal Femoral Phasic 07/29/2024 Y   Final    Right Proximal Femoral Compress 07/29/2024 C   Final    Right Proximal Femoral Augment 07/29/2024 Y   Final    Right Mid Femoral Spont 07/29/2024 Y   Final    Right Mid Femoral Phasic 07/29/2024 Y   Final    Right Mid Femoral Compress 07/29/2024 C   " Final    Right Mid Femoral Augment 07/29/2024 Y   Final    Right Distal Femoral Spont 07/29/2024 Y   Final    Right Distal Femoral Phasic 07/29/2024 Y   Final    Right Distal Femoral Compress 07/29/2024 C   Final    Right Distal Femoral Augment 07/29/2024 Y   Final    Right Popliteal Spont 07/29/2024 Y   Final    Right Popliteal Phasic 07/29/2024 Y   Final    Right Popliteal Compress 07/29/2024 C   Final    Right Popliteal Augment 07/29/2024 Y   Final    Right Posterior Tibial Compress 07/29/2024 C   Final    Right Peroneal Compress 07/29/2024 C   Final    Right Gastronemius Compress 07/29/2024 C   Final    Right Greater Saph AK Compress 07/29/2024 C   Final    Right Greater Saph BK Compress 07/29/2024 C   Final    Right Lesser Saph Compress 07/29/2024 C   Final    Left Common Femoral Spont 07/29/2024 Y   Final    Left Common Femoral Phasic 07/29/2024 Y   Final    Left Common Femoral Compress 07/29/2024 C   Final    Left Common Femoral Augment 07/29/2024 Y   Final    Left Saphenofemoral Junction Spont 07/29/2024 Y   Final    Left Saphenofemoral Junction Phasic 07/29/2024 Y   Final    Left Saphenofemoral Junction Compr* 07/29/2024 C   Final    Left Saphenofemoral Junction Augme* 07/29/2024 Y   Final    Left Profunda Femoral Spont 07/29/2024 Y   Final    Left Profunda Femoral Phasic 07/29/2024 Y   Final    Left Profunda Femoral Compress 07/29/2024 C   Final    Left Profunda Femoral Augment 07/29/2024 Y   Final    Left Proximal Femoral Spont 07/29/2024 Y   Final    Left Proximal Femoral Phasic 07/29/2024 Y   Final    Left Proximal Femoral Compress 07/29/2024 C   Final    Left Proximal Femoral Augment 07/29/2024 Y   Final    Left Mid Femoral Spont 07/29/2024 Y   Final    Left Mid Femoral Phasic 07/29/2024 Y   Final    Left Mid Femoral Compress 07/29/2024 C   Final    Left Mid Femoral Augment 07/29/2024 Y   Final    Left Distal Femoral Spont 07/29/2024 Y   Final    Left Distal Femoral Phasic 07/29/2024 Y   Final    Left  Distal Femoral Compress 07/29/2024 C   Final    Left Distal Femoral Augment 07/29/2024 Y   Final    Left Popliteal Spont 07/29/2024 Y   Final    Left Popliteal Phasic 07/29/2024 Y   Final    Left Popliteal Compress 07/29/2024 C   Final    Left Popliteal Augment 07/29/2024 Y   Final    Left Posterior Tibial Compress 07/29/2024 C   Final    Left Peroneal Compress 07/29/2024 C   Final    Left Gastronemius Compress 07/29/2024 C   Final    Left Greater Saph AK Compress 07/29/2024 C   Final    Left Greater Saph BK Compress 07/29/2024 C   Final    Left Lesser Saph Compress 07/29/2024 C   Final      DEXA Bone Density Axial    Result Date: 12/11/2024   1. Bone mineral density of the lumbar spine is within the range of normal. 2. Bone mineral density of the left femoral neck is within the range of osteoporosis.      Images were reviewed, interpreted, and dictated by Dr. Velma Chong MD Transcribed by Yumiko Lee PA-C.  This report was signed and finalized on 12/11/2024 10:38 AM by Velma Chong MD.         Colonoscopy dated 12/3/2021 per Dr. Plummer  - One 6 mm polyp in the cecum, removed with a cold snare. Resected and retrieved.  - One 3 to 4 mm polyp in the mid ascending colon, removed with a cold snare. Resected and retrieved.  - Four 2 to 4 mm polyps in the transverse colon, removed with a cold snare. Resected and retrieved.  - One 3 to 4 mm polyp in the proximal descending colon, removed with a cold snare. Resected and retrieved.  - Two 2 to 3 mm polyps in the sigmoid colon, removed with a cold snare. Resected and retrieved.  - Diverticulosis in the sigmoid colon.  -Cecum polyp biopsy, ascending colon polyp biopsy, transverse colon polyp x4 biopsy, descending colon polyp biopsy and sigmoid colon polyp x2 biopsies with tubular adenomas, no high-grade dysplasia    Assessment / Plan      1.  Personal history of colon polyps  Colonoscopy on 12/3/2021; multiple polyps removed, tubular adenomas without dysplasia.  There  is no family history of colon cancer.  She is due for her surveillance colonoscopy now.    Schedule colonoscopy  Hold [facility] 3 days before the test    The indications, technique, alternatives and potential risk and complications were discussed with the patient including but not limited to bleeding, bowel perforations, missing lesions and anesthetic complications. The patient understands and wishes to proceed with the procedure and has given their verbal consent. Written patient education information was given to the patient.   The patient will call if they have further questions before procedure.      2.  Change in bowel habit with fecal urgency  Symptoms started after starting on Ozempic.  Some of the symptoms continued even after stopping the Ozempic.  She will mostly will have a occasional episodes of fecal urgency without any significant diarrhea now.  Some element of underlying functional GI disorder suspected    Dietary changes discussed and instructions given  Will continue to monitor    3. Class 3 severe obesity due to excess calories without serious comorbidity with body mass index (BMI) of 40.0 to 44.9 in adult (HCC)  FIB 4; 1.22; less likely advanced fibrosis    Prior lab work reviewed and reveals normal liver enzymes including the last blood work on 9/19/2024 reveals normal liver enzymes.  She has a prediabetes with A1c of 6.30.  CBC was normal without any thrombocytopenia.     Patient tried Ozempic and discontinued due to adverse reactions.  Patient is high risk for developing fatty liver disease.  Lifestyle changes, dietary changes discussed and advised to avoid any weight gain.    Will get ultrasound liver  Recommend yearly CBC CMP PT/INR      Follow Up:   No follow-ups on file.    Adelso Plummer MD  [facility] [place]  12/19/2024  15:39 EST     Please note that portions of this note may have been completed with a voice recognition program.

## 2024-12-23 ENCOUNTER — OFFICE VISIT (OUTPATIENT)
Dept: INTERNAL MEDICINE | Facility: CLINIC | Age: 69
End: 2024-12-23
Payer: MEDICARE

## 2024-12-23 VITALS
HEIGHT: 67 IN | OXYGEN SATURATION: 95 % | WEIGHT: 269 LBS | RESPIRATION RATE: 18 BRPM | TEMPERATURE: 98.3 F | SYSTOLIC BLOOD PRESSURE: 142 MMHG | BODY MASS INDEX: 42.22 KG/M2 | HEART RATE: 69 BPM | DIASTOLIC BLOOD PRESSURE: 80 MMHG

## 2024-12-23 DIAGNOSIS — I48.20 CHRONIC ATRIAL FIBRILLATION: ICD-10-CM

## 2024-12-23 DIAGNOSIS — J02.9 SORE THROAT: ICD-10-CM

## 2024-12-23 DIAGNOSIS — I10 BENIGN ESSENTIAL HYPERTENSION: Primary | ICD-10-CM

## 2024-12-23 DIAGNOSIS — I10 BENIGN ESSENTIAL HYPERTENSION: ICD-10-CM

## 2024-12-23 DIAGNOSIS — J20.9 ACUTE BRONCHITIS, UNSPECIFIED ORGANISM: ICD-10-CM

## 2024-12-23 DIAGNOSIS — H66.003 ACUTE SUPPURATIVE OTITIS MEDIA OF BOTH EARS WITHOUT SPONTANEOUS RUPTURE OF TYMPANIC MEMBRANES, RECURRENCE NOT SPECIFIED: ICD-10-CM

## 2024-12-23 DIAGNOSIS — J06.0 ACUTE LARYNGOPHARYNGITIS: ICD-10-CM

## 2024-12-23 DIAGNOSIS — J01.01 ACUTE RECURRENT MAXILLARY SINUSITIS: ICD-10-CM

## 2024-12-23 DIAGNOSIS — R05.1 ACUTE COUGH: ICD-10-CM

## 2024-12-23 LAB
EXPIRATION DATE: NORMAL
EXPIRATION DATE: NORMAL
FLUAV AG UPPER RESP QL IA.RAPID: NOT DETECTED
FLUBV AG UPPER RESP QL IA.RAPID: NOT DETECTED
INTERNAL CONTROL: NORMAL
INTERNAL CONTROL: NORMAL
Lab: NORMAL
Lab: NORMAL
S PYO AG THROAT QL: NEGATIVE
SARS-COV-2 AG UPPER RESP QL IA.RAPID: NOT DETECTED

## 2024-12-23 PROCEDURE — 1159F MED LIST DOCD IN RCRD: CPT | Performed by: INTERNAL MEDICINE

## 2024-12-23 PROCEDURE — 87880 STREP A ASSAY W/OPTIC: CPT | Performed by: INTERNAL MEDICINE

## 2024-12-23 PROCEDURE — 3044F HG A1C LEVEL LT 7.0%: CPT | Performed by: INTERNAL MEDICINE

## 2024-12-23 PROCEDURE — G2211 COMPLEX E/M VISIT ADD ON: HCPCS | Performed by: INTERNAL MEDICINE

## 2024-12-23 PROCEDURE — 1126F AMNT PAIN NOTED NONE PRSNT: CPT | Performed by: INTERNAL MEDICINE

## 2024-12-23 PROCEDURE — 99214 OFFICE O/P EST MOD 30 MIN: CPT | Performed by: INTERNAL MEDICINE

## 2024-12-23 PROCEDURE — 1160F RVW MEDS BY RX/DR IN RCRD: CPT | Performed by: INTERNAL MEDICINE

## 2024-12-23 PROCEDURE — 87428 SARSCOV & INF VIR A&B AG IA: CPT | Performed by: INTERNAL MEDICINE

## 2024-12-23 RX ORDER — AZITHROMYCIN 250 MG/1
TABLET, FILM COATED ORAL
Qty: 6 TABLET | Refills: 0 | Status: SHIPPED | OUTPATIENT
Start: 2024-12-23

## 2024-12-23 RX ORDER — METOPROLOL SUCCINATE 25 MG/1
25 TABLET, EXTENDED RELEASE ORAL DAILY
Qty: 90 TABLET | Refills: 3 | OUTPATIENT
Start: 2024-12-23

## 2024-12-23 RX ORDER — ALENDRONATE SODIUM 70 MG/1
70 TABLET ORAL
Qty: 4 TABLET | Refills: 4 | Status: SHIPPED | OUTPATIENT
Start: 2024-12-23

## 2024-12-23 RX ORDER — RIVAROXABAN 20 MG/1
20 TABLET, FILM COATED ORAL NIGHTLY
Qty: 90 TABLET | Refills: 3 | OUTPATIENT
Start: 2024-12-23

## 2024-12-23 NOTE — PROGRESS NOTES
"Chief Complaint  Earache, Sore Throat, Hypertension, Cough, Sinus Problem, and Nasal Congestion    Subjective        Neetu Wick presents to [facility]    HPI: Patient is here to follow up on the blood pressure   and  is taking the blood pressure medications as prescribed and has had no side effects. The patient also complains of ear pain , yellow nasal discharge , sore throat  And sinus congestion since the past few days but worse yesterday, patient has been trying over the counter medications with not much relief in the symptoms , patient also complains of cough with  sputum and states she may have had a fever yesterday  .    Objective   Vital Signs:  /80   Pulse 69   Temp 98.3 °F (36.8 °C) (Temporal)   Resp 18   Ht 170.2 cm (67\")   Wt 122 kg (269 lb)   SpO2 95%   BMI 42.13 kg/m²   Estimated body mass index is 42.13 kg/m² as calculated from the following:    Height as of this encounter: 170.2 cm (67\").    Weight as of this encounter: 122 kg (269 lb).            Physical Exam  Vitals and nursing note reviewed.   Constitutional:       General: She is not in acute distress.     Appearance: Normal appearance. She is not diaphoretic.   HENT:      Head: Normocephalic and atraumatic.      Right Ear: External ear normal. Tympanic membrane is erythematous.      Left Ear: External ear normal. Tympanic membrane is erythematous.      Nose: Nose normal.      Right Sinus: Maxillary sinus tenderness present.      Left Sinus: Maxillary sinus tenderness present.   Eyes:      Extraocular Movements: Extraocular movements intact.      Conjunctiva/sclera: Conjunctivae normal.   Neck:      Trachea: Trachea normal.   Cardiovascular:      Rate and Rhythm: Normal rate and regular rhythm.      Heart sounds: Normal heart sounds.   Pulmonary:      Effort: Pulmonary effort is normal. No respiratory distress.      Breath sounds: Normal breath sounds.   Abdominal:      General: Abdomen is flat. "   Musculoskeletal:      Cervical back: Neck supple.      Comments: Moves all limbs   Skin:     General: Skin is warm.   Neurological:      Mental Status: She is alert and oriented to person, place, and time.      Comments: No gross motor or sensory deficits        Result Review :  The following data was reviewed by: Ines Hancock MD on 12/23/2024:      Strep          12/23/2024    09:06   Common Labs   POC Strep A, Molecular Negative                Assessment and Plan   Diagnoses and all orders for this visit:    1. Benign essential hypertension (Primary)    2. Acute bronchitis, unspecified organism  -     azithromycin (Zithromax Z-Davis) 250 MG tablet; Take 2 tablets the first day, then 1 tablet daily for 4 days.  Dispense: 6 tablet; Refill: 0    3. Acute recurrent maxillary sinusitis  -     azithromycin (Zithromax Z-Davis) 250 MG tablet; Take 2 tablets the first day, then 1 tablet daily for 4 days.  Dispense: 6 tablet; Refill: 0    4. Acute laryngopharyngitis  -     azithromycin (Zithromax Z-Davis) 250 MG tablet; Take 2 tablets the first day, then 1 tablet daily for 4 days.  Dispense: 6 tablet; Refill: 0    5. Acute suppurative otitis media of both ears without spontaneous rupture of tympanic membranes, recurrence not specified  -     azithromycin (Zithromax Z-Davis) 250 MG tablet; Take 2 tablets the first day, then 1 tablet daily for 4 days.  Dispense: 6 tablet; Refill: 0    6. Sore throat  -     POCT rapid strep A  -     POCT SARS-CoV-2 Antigen HANNAH + Flu    7. Acute cough       Plan :   1.Benign Essential Hypertension: will  continue current medications, low sodium diet, patient to continue to monitor  blood pressure  2 .acute bronchitis: We will start oral antibiotics ,    3.Acute suppurative otitis media: will   start oral antibiotics    4.Acute maxillary sinusitis:   will   start oral antibiotics   5. Acute laryngopharyngitis:  will   Start  oral antibiotics  6.  Sore throat: In office strep test, COVID and flu test  negative  7.  Cough: OTC cough medication, in office flu, COVID, strep test all negative.       Follow Up   No follow-ups on file.  Patient was given instructions and counseling regarding her condition or for health maintenance advice. Please see specific information pulled into the AVS if appropriate.

## 2024-12-26 DIAGNOSIS — F33.0 MILD EPISODE OF RECURRENT MAJOR DEPRESSIVE DISORDER: ICD-10-CM

## 2024-12-26 DIAGNOSIS — I10 BENIGN ESSENTIAL HYPERTENSION: ICD-10-CM

## 2024-12-26 DIAGNOSIS — I48.20 CHRONIC ATRIAL FIBRILLATION: ICD-10-CM

## 2024-12-26 DIAGNOSIS — E78.2 MIXED HYPERLIPIDEMIA: ICD-10-CM

## 2024-12-26 RX ORDER — DILTIAZEM HYDROCHLORIDE 180 MG/1
180 CAPSULE, COATED, EXTENDED RELEASE ORAL
Qty: 90 CAPSULE | Refills: 3 | OUTPATIENT
Start: 2024-12-26

## 2024-12-26 RX ORDER — ATORVASTATIN CALCIUM 20 MG/1
20 TABLET, FILM COATED ORAL NIGHTLY
Qty: 90 TABLET | Refills: 3 | OUTPATIENT
Start: 2024-12-26

## 2024-12-26 RX ORDER — BUPROPION HYDROCHLORIDE 300 MG/1
300 TABLET ORAL DAILY
Qty: 90 TABLET | Refills: 3 | OUTPATIENT
Start: 2024-12-26

## 2024-12-26 RX ORDER — METOPROLOL SUCCINATE 25 MG/1
25 TABLET, EXTENDED RELEASE ORAL DAILY
Qty: 90 TABLET | Refills: 3 | OUTPATIENT
Start: 2024-12-26

## 2024-12-26 RX ORDER — RIVAROXABAN 20 MG/1
20 TABLET, FILM COATED ORAL NIGHTLY
Qty: 90 TABLET | Refills: 3 | OUTPATIENT
Start: 2024-12-26

## 2025-01-07 DIAGNOSIS — I10 BENIGN ESSENTIAL HYPERTENSION: ICD-10-CM

## 2025-01-07 DIAGNOSIS — F33.0 MILD EPISODE OF RECURRENT MAJOR DEPRESSIVE DISORDER: ICD-10-CM

## 2025-01-07 DIAGNOSIS — E78.2 MIXED HYPERLIPIDEMIA: ICD-10-CM

## 2025-01-07 DIAGNOSIS — I48.20 CHRONIC ATRIAL FIBRILLATION: ICD-10-CM

## 2025-01-07 RX ORDER — BUPROPION HYDROCHLORIDE 300 MG/1
300 TABLET ORAL DAILY
Qty: 90 TABLET | Refills: 1 | Status: SHIPPED | OUTPATIENT
Start: 2025-01-07

## 2025-01-07 RX ORDER — METOPROLOL SUCCINATE 25 MG/1
25 TABLET, EXTENDED RELEASE ORAL DAILY
Qty: 90 TABLET | Refills: 1 | Status: SHIPPED | OUTPATIENT
Start: 2025-01-07

## 2025-01-07 RX ORDER — DILTIAZEM HYDROCHLORIDE 180 MG/1
180 CAPSULE, COATED, EXTENDED RELEASE ORAL
Qty: 90 CAPSULE | Refills: 1 | Status: SHIPPED | OUTPATIENT
Start: 2025-01-07

## 2025-01-07 RX ORDER — CLOBETASOL PROPIONATE 0.5 MG/G
1 CREAM TOPICAL 2 TIMES DAILY
Qty: 15 G | Refills: 3 | Status: SHIPPED | OUTPATIENT
Start: 2025-01-07

## 2025-01-07 RX ORDER — ATORVASTATIN CALCIUM 20 MG/1
20 TABLET, FILM COATED ORAL NIGHTLY
Qty: 90 TABLET | Refills: 1 | Status: SHIPPED | OUTPATIENT
Start: 2025-01-07

## 2025-01-07 RX ORDER — ESTRADIOL 0.5 MG/1
0.5 TABLET ORAL DAILY
Qty: 90 TABLET | Refills: 3 | Status: SHIPPED | OUTPATIENT
Start: 2025-01-07

## 2025-01-24 LAB
ALBUMIN SERPL-MCNC: 3.8 G/DL (ref 3.5–5.2)
ALBUMIN/CREAT UR: 32 MG/G CREAT (ref 0–29)
ALBUMIN/GLOB SERPL: 1.3 G/DL
ALP SERPL-CCNC: 114 U/L (ref 39–117)
ALT SERPL-CCNC: 11 U/L (ref 1–33)
AST SERPL-CCNC: 15 U/L (ref 1–32)
BILIRUB SERPL-MCNC: 0.9 MG/DL (ref 0–1.2)
BUN SERPL-MCNC: 14 MG/DL (ref 8–23)
BUN/CREAT SERPL: 16.3 (ref 7–25)
CALCIUM SERPL-MCNC: 8.9 MG/DL (ref 8.6–10.5)
CHLORIDE SERPL-SCNC: 104 MMOL/L (ref 98–107)
CHOLEST SERPL-MCNC: 152 MG/DL (ref 0–200)
CO2 SERPL-SCNC: 27 MMOL/L (ref 22–29)
CREAT SERPL-MCNC: 0.86 MG/DL (ref 0.57–1)
CREAT UR-MCNC: 107.6 MG/DL
EGFRCR SERPLBLD CKD-EPI 2021: 72.3 ML/MIN/1.73
ERYTHROCYTE [DISTWIDTH] IN BLOOD BY AUTOMATED COUNT: 12.1 % (ref 12.3–15.4)
GLOBULIN SER CALC-MCNC: 3 GM/DL
GLUCOSE SERPL-MCNC: 111 MG/DL (ref 65–99)
HBA1C MFR BLD: 6.2 % (ref 4.8–5.6)
HCT VFR BLD AUTO: 41.3 % (ref 34–46.6)
HDLC SERPL-MCNC: 62 MG/DL (ref 40–60)
HGB BLD-MCNC: 12.9 G/DL (ref 12–15.9)
LDLC SERPL CALC-MCNC: 74 MG/DL (ref 0–100)
MCH RBC QN AUTO: 29.9 PG (ref 26.6–33)
MCHC RBC AUTO-ENTMCNC: 31.2 G/DL (ref 31.5–35.7)
MCV RBC AUTO: 95.6 FL (ref 79–97)
MICROALBUMIN UR-MCNC: 34.3 UG/ML
PLATELET # BLD AUTO: 246 10*3/MM3 (ref 140–450)
POTASSIUM SERPL-SCNC: 3.9 MMOL/L (ref 3.5–5.2)
PROT SERPL-MCNC: 6.8 G/DL (ref 6–8.5)
RBC # BLD AUTO: 4.32 10*6/MM3 (ref 3.77–5.28)
SODIUM SERPL-SCNC: 140 MMOL/L (ref 136–145)
TRIGL SERPL-MCNC: 86 MG/DL (ref 0–150)
VLDLC SERPL CALC-MCNC: 16 MG/DL (ref 5–40)
WBC # BLD AUTO: 4.88 10*3/MM3 (ref 3.4–10.8)

## 2025-01-27 ENCOUNTER — OFFICE VISIT (OUTPATIENT)
Dept: INTERNAL MEDICINE | Facility: CLINIC | Age: 70
End: 2025-01-27
Payer: MEDICARE

## 2025-01-27 VITALS
HEART RATE: 70 BPM | WEIGHT: 269 LBS | HEIGHT: 67 IN | SYSTOLIC BLOOD PRESSURE: 159 MMHG | BODY MASS INDEX: 42.22 KG/M2 | TEMPERATURE: 98.2 F | DIASTOLIC BLOOD PRESSURE: 72 MMHG | RESPIRATION RATE: 16 BRPM | OXYGEN SATURATION: 96 %

## 2025-01-27 DIAGNOSIS — I10 BENIGN ESSENTIAL HYPERTENSION: Primary | ICD-10-CM

## 2025-01-27 DIAGNOSIS — E78.2 MIXED HYPERLIPIDEMIA: ICD-10-CM

## 2025-01-27 DIAGNOSIS — R73.01 IMPAIRED FASTING GLUCOSE: ICD-10-CM

## 2025-01-27 DIAGNOSIS — I48.20 CHRONIC ATRIAL FIBRILLATION: ICD-10-CM

## 2025-01-27 DIAGNOSIS — F33.0 MILD EPISODE OF RECURRENT MAJOR DEPRESSIVE DISORDER: ICD-10-CM

## 2025-01-27 PROCEDURE — 99214 OFFICE O/P EST MOD 30 MIN: CPT | Performed by: INTERNAL MEDICINE

## 2025-01-27 PROCEDURE — 3044F HG A1C LEVEL LT 7.0%: CPT | Performed by: INTERNAL MEDICINE

## 2025-01-27 PROCEDURE — 1126F AMNT PAIN NOTED NONE PRSNT: CPT | Performed by: INTERNAL MEDICINE

## 2025-01-27 PROCEDURE — 1160F RVW MEDS BY RX/DR IN RCRD: CPT | Performed by: INTERNAL MEDICINE

## 2025-01-27 PROCEDURE — 1159F MED LIST DOCD IN RCRD: CPT | Performed by: INTERNAL MEDICINE

## 2025-01-27 PROCEDURE — G2211 COMPLEX E/M VISIT ADD ON: HCPCS | Performed by: INTERNAL MEDICINE

## 2025-01-27 RX ORDER — DILTIAZEM HYDROCHLORIDE 180 MG/1
180 CAPSULE, COATED, EXTENDED RELEASE ORAL
Qty: 90 CAPSULE | Refills: 1 | Status: SHIPPED | OUTPATIENT
Start: 2025-01-27

## 2025-01-27 RX ORDER — BUPROPION HYDROCHLORIDE 300 MG/1
300 TABLET ORAL DAILY
Qty: 90 TABLET | Refills: 1 | Status: SHIPPED | OUTPATIENT
Start: 2025-01-27

## 2025-01-27 RX ORDER — ATORVASTATIN CALCIUM 20 MG/1
20 TABLET, FILM COATED ORAL NIGHTLY
Qty: 90 TABLET | Refills: 1 | Status: SHIPPED | OUTPATIENT
Start: 2025-01-27

## 2025-01-27 RX ORDER — METOPROLOL SUCCINATE 25 MG/1
25 TABLET, EXTENDED RELEASE ORAL DAILY
Qty: 90 TABLET | Refills: 1 | Status: SHIPPED | OUTPATIENT
Start: 2025-01-27

## 2025-01-27 NOTE — PROGRESS NOTES
"Chief Complaint  Hypertension and Hyperlipidemia    Subjective        Neetu Wick presents to [facility]  HPI: Patient is here to follow up on the blood pressure   and atrial fibrillation medications as prescribed and has had no side effects. The patient is also here to follow up on the cholesterol and  had  lab work done .  The patient also complains of depression and needs refills on medications .  She is scheduled for colonoscopy next week  Hyperlipidemia   Pertinent negatives include no chest pain or shortness of breath.   Hypertension   Pertinent negatives include no chest pain, palpitations or shortness of breath.      Objective   Vital Signs:  /72   Pulse 70   Temp 98.2 °F (36.8 °C) (Temporal)   Resp 16   Ht 170.2 cm (67\")   Wt 122 kg (269 lb)   SpO2 96%   BMI 42.13 kg/m²   Estimated body mass index is 42.13 kg/m² as calculated from the following:    Height as of this encounter: 170.2 cm (67\").    Weight as of this encounter: 122 kg (269 lb).            Physical Exam  Vitals and nursing note reviewed.   Constitutional:       General: She is not in acute distress.     Appearance: Normal appearance. She is not diaphoretic.   HENT:      Head: Normocephalic and atraumatic.      Right Ear: External ear normal.      Left Ear: External ear normal.      Nose: Nose normal.   Eyes:      Extraocular Movements: Extraocular movements intact.      Conjunctiva/sclera: Conjunctivae normal.   Neck:      Trachea: Trachea normal.   Cardiovascular:      Rate and Rhythm: Normal rate and regular rhythm.      Heart sounds: Normal heart sounds.   Pulmonary:      Effort: Pulmonary effort is normal. No respiratory distress.      Breath sounds: Normal breath sounds.   Abdominal:      General: Abdomen is flat.   Musculoskeletal:      Cervical back: Neck supple.      Comments: Compression stocking bilateral lower extremities  Moves all limbs   Skin:     General: Skin is warm.   Neurological: "      Mental Status: She is alert and oriented to person, place, and time.      Comments: No gross motor or sensory deficits        Result Review :  The following data was reviewed by: Ines Hancock MD on 01/27/2025:  Common labs          9/19/2024    13:37 1/23/2025    10:19   Common Labs   Glucose 106  111    BUN 10  14    Creatinine 0.75  0.86    Sodium 140  140    Potassium 4.0  3.9    Chloride 106  104    Calcium 9.0  8.9    Total Protein 6.3  6.8    Albumin 4.0  3.8    Total Bilirubin 0.7  0.9    Alkaline Phosphatase 113  114    AST (SGOT) 17  15    ALT (SGPT) 14  11    WBC 4.08  4.88    Hemoglobin 12.6  12.9    Hematocrit 39.0  41.3    Platelets 265  246    Total Cholesterol 125  152    Triglycerides 88  86    HDL Cholesterol 55  62    LDL Cholesterol  53  74    Hemoglobin A1C 6.30  6.20    Microalbumin, Urine 41.9  34.3                Assessment and Plan   Diagnoses and all orders for this visit:    1. Benign essential hypertension (Primary)  -     metoprolol succinate XL (TOPROL-XL) 25 MG 24 hr tablet; Take 1 tablet by mouth Daily.  Dispense: 90 tablet; Refill: 1    2. Mixed hyperlipidemia  -     atorvastatin (LIPITOR) 20 MG tablet; Take 1 tablet by mouth Every Night.  Dispense: 90 tablet; Refill: 1  -     CBC (No Diff)  -     Comprehensive Metabolic Panel  -     Lipid Panel    3. Impaired fasting glucose  -     Hemoglobin A1c    4. Chronic atrial fibrillation  -     rivaroxaban (Xarelto) 20 MG tablet; Take 1 tablet by mouth Every Night.  Dispense: 90 tablet; Refill: 1  -     dilTIAZem CD (CARDIZEM CD) 180 MG 24 hr capsule; Take 1 capsule by mouth every night at bedtime.  Dispense: 90 capsule; Refill: 1    5. Mild episode of recurrent major depressive disorder  -     buPROPion XL (WELLBUTRIN XL) 300 MG 24 hr tablet; Take 1 tablet by mouth Daily.  Dispense: 90 tablet; Refill: 1      Plan:  1.  Benign essential hypertension: Will continue current medication, low-sodium diet advised, Counseled to regularly  check BP at home with goal averaging <130/80.   2.mixed hyperlipidemia:  reviewed  fasting CMP and lipid panel.  Diet and exercise counseled,  Will continue current medications  3. impaired glucose   :   reviewed  fasting CMP  and hba1c  6.2 , diet and exercise counseled  4.  Atrial fibrillation: rate controlled, on anticoagulation ,  labs reviewed  5. Major depression: Refill Wellbutrin 300 mg daily          Follow Up   Return in about 4 months (around 5/15/2025).  Patient was given instructions and counseling regarding her condition or for health maintenance advice. Please see specific information pulled into the AVS if appropriate.

## 2025-01-29 NOTE — PRE-PROCEDURE INSTRUCTIONS
PAT phone history completed with patient for upcoming procedure on 1/31/25, with Dr. Plummer.    PAT PASS reviewed with patient and they verbalize understanding of the following:     Do not eat or drink anything after midnight the night before procedure unless otherwise instructed by physician/surgeon's office, this includes no gum, candy, mints, tobacco products or e-cigarettes.  Do not shave the area to be operated on at least 48 hours prior to procedure.  Do not wear makeup, lotion, hair products, or nail polish.  Do not wear any jewelry and remove all piercings.  Do not wear any adhesive if you wear dentures.  Do not wear contacts; bring in glasses if needed.  Only take medications on the morning of procedure as instructed by PAT nurse per anesthesia guidelines or as instructed by physician's office.  If you are on any blood thinners reach out to the physician/surgeon's office for instructions on when/if they will need to be stopped prior to procedure.  Bring in picture ID and insurance card, advanced directive copies if applicable, CPAP/BIPAP/Inhalers if indicated morning of procedure, leave any other valuables at home.  Ensure you have arranged for someone to drive you home the day of your procedure and someone to care for you at home afterwards. It is recommended that you do not drive, drink alcohol, or make any major legal decisions for at least 24 hours after your procedure is complete.  ERAS instructions given unless otherwise instructed per surgeon's orders.    Instructions given on hospital entrance and registration location.

## 2025-01-30 ENCOUNTER — TELEPHONE (OUTPATIENT)
Dept: GASTROENTEROLOGY | Facility: CLINIC | Age: 70
End: 2025-01-30
Payer: MEDICARE

## 2025-01-30 NOTE — TELEPHONE ENCOUNTER
Caller: Neetu Wick    Relationship to patient: Self    Best call back number: [phone]     Patient is needing: PATIENT IS SCHEDULED FOR A COLONOSCOPY TOMORROW BUT NEEDS TO CANCEL DUE TO FALLING YESTERDAY.  SHE WILL CALL BACK TO RESCHEDULE.

## 2025-01-31 ENCOUNTER — OFFICE VISIT (OUTPATIENT)
Dept: INTERNAL MEDICINE | Facility: CLINIC | Age: 70
End: 2025-01-31
Payer: MEDICARE

## 2025-01-31 ENCOUNTER — HOSPITAL ENCOUNTER (OUTPATIENT)
Dept: GENERAL RADIOLOGY | Facility: HOSPITAL | Age: 70
Discharge: HOME | End: 2025-01-31
Payer: MEDICARE

## 2025-01-31 VITALS
TEMPERATURE: 97 F | HEIGHT: 67 IN | OXYGEN SATURATION: 97 % | SYSTOLIC BLOOD PRESSURE: 140 MMHG | WEIGHT: 266 LBS | BODY MASS INDEX: 41.75 KG/M2 | DIASTOLIC BLOOD PRESSURE: 88 MMHG | HEART RATE: 70 BPM

## 2025-01-31 DIAGNOSIS — M79.18 LEFT BUTTOCK PAIN: Primary | ICD-10-CM

## 2025-01-31 PROCEDURE — 3044F HG A1C LEVEL LT 7.0%: CPT | Performed by: FAMILY MEDICINE

## 2025-01-31 PROCEDURE — 1160F RVW MEDS BY RX/DR IN RCRD: CPT | Performed by: FAMILY MEDICINE

## 2025-01-31 PROCEDURE — 73502 X-RAY EXAM HIP UNI 2-3 VIEWS: CPT

## 2025-01-31 PROCEDURE — 1159F MED LIST DOCD IN RCRD: CPT | Performed by: FAMILY MEDICINE

## 2025-01-31 PROCEDURE — 1125F AMNT PAIN NOTED PAIN PRSNT: CPT | Performed by: FAMILY MEDICINE

## 2025-01-31 PROCEDURE — 72220 X-RAY EXAM SACRUM TAILBONE: CPT

## 2025-01-31 PROCEDURE — 99213 OFFICE O/P EST LOW 20 MIN: CPT | Performed by: FAMILY MEDICINE

## 2025-01-31 RX ORDER — BACLOFEN 10 MG/1
10 TABLET ORAL 2 TIMES DAILY PRN
Qty: 60 TABLET | Refills: 0 | Status: SHIPPED | OUTPATIENT
Start: 2025-01-31

## 2025-01-31 NOTE — PROGRESS NOTES
Neetu Wick is a 69 y.o. female.    Chief Complaint   Patient presents with    Fall     Fell on 01/29/2025, Left side, the worst pain is in her buttock. She feels tight and some pain. Some muscle spasms.       HPI   History of Present Illness  The patient presents with buttock pain after a fall. She slipped on a puppy pad, hyperextending her left leg, causing a burning sensation and intense pain. Despite the pain, she continued her activities but had difficulty entering her vehicle and noticed an unsteady gait. She reports discomfort while sitting, especially when exiting her car, but found some relief from walking at [employer]. She seeks a muscle relaxer for her symptoms. She has not taken Xarelto due to a scheduled colonoscopy today. Tylenol has provided some relief. She suspects bursitis due to a burning sensation on one side and has been using ice and Biofreeze. She inquires about taking 2 Tylenol every 6 hours with other medication. The pain disrupts her sleep and impedes bending over.     The following portions of the patient's history were reviewed and updated as appropriate: allergies, current medications, past family history, past medical history, past social history, past surgical history and problem list.     Allergies   Allergen Reactions    Ozempic (0.25 Or 0.5 Mg-Dose) [Semaglutide(0.25 Or 0.5mg-Dos)] Other (See Comments)     Diarrhea, nausea    Codeine Itching         Current Outpatient Medications:     alendronate (Fosamax) 70 MG tablet, Take 1 tablet by mouth Every 7 (Seven) Days. (Patient taking differently: Take 1 tablet by mouth Every 7 (Seven) Days. Hasn't started), Disp: 4 tablet, Rfl: 4    atorvastatin (LIPITOR) 20 MG tablet, Take 1 tablet by mouth Every Night., Disp: 90 tablet, Rfl: 1    buPROPion XL (WELLBUTRIN XL) 300 MG 24 hr tablet, Take 1 tablet by mouth Daily., Disp: 90 tablet, Rfl: 1    clobetasol propionate (Temovate) 0.05 % cream, Apply 1 Application topically to the appropriate area  "as directed 2 (Two) Times a Day. (Patient taking differently: Apply 1 Application topically to the appropriate area as directed As Needed.), Disp: 15 g, Rfl: 3    clotrimazole (LOTRIMIN) 1 % cream, Apply 1 Application topically to the appropriate area as directed Daily., Disp: , Rfl:     dilTIAZem CD (CARDIZEM CD) 180 MG 24 hr capsule, Take 1 capsule by mouth every night at bedtime., Disp: 90 capsule, Rfl: 1    estradiol (ESTRACE) 0.5 MG tablet, Take 1 tablet by mouth Daily., Disp: 90 tablet, Rfl: 3    Loratadine 10 MG capsule, Take 1 capsule by mouth Daily., Disp: , Rfl:     metoprolol succinate XL (TOPROL-XL) 25 MG 24 hr tablet, Take 1 tablet by mouth Daily., Disp: 90 tablet, Rfl: 1    baclofen (LIORESAL) 10 MG tablet, Take 1 tablet by mouth 2 (Two) Times a Day As Needed for Muscle Spasms., Disp: 60 tablet, Rfl: 0    rivaroxaban (Xarelto) 20 MG tablet, Take 1 tablet by mouth Every Night. (Patient not taking: Reported on 1/31/2025), Disp: 90 tablet, Rfl: 1    ROS    Review of Systems   Musculoskeletal:  Positive for gait problem.        Left buttock pain   Neurological:  Negative for numbness.       Vitals:    01/31/25 0826   BP: 140/88   Pulse: 70   Temp: 97 °F (36.1 °C)   SpO2: 97%   Weight: 121 kg (266 lb)   Height: 170.2 cm (67\")   PainSc:   6         Physical Exam     Physical Exam  Constitutional:       General: She is not in acute distress.     Appearance: Normal appearance. She is well-developed.   HENT:      Head: Normocephalic and atraumatic.      Right Ear: External ear normal.      Left Ear: External ear normal.   Eyes:      Extraocular Movements: Extraocular movements intact.      Conjunctiva/sclera: Conjunctivae normal.   Cardiovascular:      Rate and Rhythm: Normal rate and regular rhythm.      Heart sounds: No murmur heard.  Pulmonary:      Effort: Pulmonary effort is normal. No respiratory distress.      Breath sounds: Normal breath sounds. No wheezing.   Abdominal:      General: Bowel sounds are " normal. There is no distension.      Palpations: Abdomen is soft.      Tenderness: There is no abdominal tenderness.   Musculoskeletal:      Right lower leg: No edema.      Left lower leg: No edema.        Legs:       Comments: Left buttock tenderness, decreased ROM in left hip   Skin:     General: Skin is warm and dry.   Neurological:      Mental Status: She is alert and oriented to person, place, and time.      Cranial Nerves: No cranial nerve deficit.   Psychiatric:         Mood and Affect: Mood normal.         Behavior: Behavior normal.           Diagnoses and all orders for this visit:    1. Left buttock pain (Primary)  -     XR Hip With or Without Pelvis 2 - 3 View Left  -     XR sacrum and coccyx    Other orders  -     baclofen (LIORESAL) 10 MG tablet; Take 1 tablet by mouth 2 (Two) Times a Day As Needed for Muscle Spasms.  Dispense: 60 tablet; Refill: 0        Assessment & Plan  1. Left buttock pain likely due to piriformis muscle strain from the fall. Ordered X-rays to rule out potential fracture. Prescribed baclofen. Advised to continue Tylenol, use lidocaine patches, Biofreeze, and ice. Resume Xarelto but discontinue 2 days before rescheduled colonoscopy. Consider alternative if baclofen is ineffective.    New Medications Ordered This Visit   Medications    baclofen (LIORESAL) 10 MG tablet     Sig: Take 1 tablet by mouth 2 (Two) Times a Day As Needed for Muscle Spasms.     Dispense:  60 tablet     Refill:  0       No orders of the defined types were placed in this encounter.      Return if symptoms worsen or fail to improve.    Sarah Esparza,

## 2025-02-05 ENCOUNTER — HOSPITAL ENCOUNTER (EMERGENCY)
Facility: HOSPITAL | Age: 70
Discharge: HOME | End: 2025-02-05
Attending: STUDENT IN AN ORGANIZED HEALTH CARE EDUCATION/TRAINING PROGRAM | Admitting: STUDENT IN AN ORGANIZED HEALTH CARE EDUCATION/TRAINING PROGRAM
Payer: MEDICARE

## 2025-02-05 VITALS
RESPIRATION RATE: 18 BRPM | TEMPERATURE: 98.3 F | OXYGEN SATURATION: 96 % | HEART RATE: 72 BPM | WEIGHT: 266.76 LBS | DIASTOLIC BLOOD PRESSURE: 57 MMHG | HEIGHT: 67 IN | SYSTOLIC BLOOD PRESSURE: 143 MMHG | BODY MASS INDEX: 41.87 KG/M2

## 2025-02-05 DIAGNOSIS — M25.552 LEFT HIP PAIN: ICD-10-CM

## 2025-02-05 DIAGNOSIS — M54.32 SCIATICA OF LEFT SIDE: Primary | ICD-10-CM

## 2025-02-05 PROCEDURE — 96372 THER/PROPH/DIAG INJ SC/IM: CPT

## 2025-02-05 PROCEDURE — 25010000002 KETOROLAC TROMETHAMINE PER 15 MG: Performed by: STUDENT IN AN ORGANIZED HEALTH CARE EDUCATION/TRAINING PROGRAM

## 2025-02-05 PROCEDURE — 99282 EMERGENCY DEPT VISIT SF MDM: CPT | Performed by: STUDENT IN AN ORGANIZED HEALTH CARE EDUCATION/TRAINING PROGRAM

## 2025-02-05 RX ORDER — KETOROLAC TROMETHAMINE 30 MG/ML
30 INJECTION, SOLUTION INTRAMUSCULAR; INTRAVENOUS ONCE
Status: COMPLETED | OUTPATIENT
Start: 2025-02-05 | End: 2025-02-05

## 2025-02-05 RX ADMIN — KETOROLAC TROMETHAMINE 30 MG: 30 INJECTION, SOLUTION INTRAMUSCULAR; INTRAVENOUS at 01:27

## 2025-02-05 NOTE — DISCHARGE INSTRUCTIONS
Recommend continuing Tylenol along with prescribed muscle relaxers to help with pain.  Follow-up closely with primary care provider.  Return to emergency department if you continue to have significantly worsening pain and difficulty walking.

## 2025-02-05 NOTE — ED PROVIDER NOTES
[facility] EMERGENCY DEPARTMENT  Emergency Department Encounter  Emergency Medicine Physician Note       Pt Name: Neetu Wick  MRN: [id number]  Pt :   1955  Room Number:    Date of encounter:  2025  PCP: Ines Hancock MD  ED Provider: Dustin Tena MD    Historian: Patient      HPI:  Chief Complaint: Hip        Context: Neetu Wick is a 69 y.o. female who presents to the ED c/o hip pain.  Patient states that around 8 days ago she slipped and fell and has been having issues with pain in her left hip ever since.  Has been taking Tylenol along with a prescribed muscle relaxer from her PCP but pain has not improved.  Had x-rays that were negative for acute fracture or dislocation and has been able to ambulate.  Ambulation has gotten significantly painful with pain radiating into her left groin.  Has history of sciatica in her right hip before but never her left.      PAST MEDICAL HISTORY  Past Medical History:   Diagnosis Date    Afib     Arthritis     Colon polyp 2021    Depression     Dyslipidemia     Ear piercing     Fracture     right ankle    Fracture, radius 2015    left    H/O echocardiogram     2016    History of nuclear stress test     2016    Hypertension     Incontinence     Murmur     Obesity     Ovarian failure     Panic attacks     Pedal edema     PONV (postoperative nausea and vomiting)     Seasonal allergies     Sleep apnea     Does not wear machine.    Urinary tract infection     Varicose vein of leg     Venous insufficiency     Vitamin D deficiency          PAST SURGICAL HISTORY  Past Surgical History:   Procedure Laterality Date    ABDOMINAL HYSTERECTOMY      Total    BILATERAL SALPINGO OOPHORECTOMY      BLADDER SUSPENSION      COLONOSCOPY  2015    COLONOSCOPY N/A 2021    Procedure: COLONOSCOPY with polypectomy x9;  Surgeon: Adelso Plummer MD;  Location: [facility] ENDOSCOPY;  Service: Gastroenterology;  Laterality:  N/A;    ENDOSCOPY      ENDOVENOUS ABLATION SAPHENOUS VEIN W/ LASER Bilateral     OOPHORECTOMY Bilateral     ORIF ANKLE FRACTURE Right     ORIF WRIST FRACTURE Left 2013    unknown of surgeon    WISDOM TOOTH EXTRACTION           FAMILY HISTORY  Family History   Problem Relation Age of Onset    Hypertension Mother     Obesity Mother     Arthritis Mother     Hypertension Father     Dementia Father     Atrial fibrillation Father     Obesity Sister     Heart disease Sister     Hypertension Sister     Stroke Sister     Thyroid disease Sister     Cancer Maternal Grandmother         breast    Breast cancer Maternal Grandmother 80    Cancer Maternal Aunt         breast    Breast cancer Maternal Aunt         DX AGE MID 30'S    Colon cancer Neg Hx     Ovarian cancer Neg Hx          SOCIAL HISTORY  Social History     Socioeconomic History    Marital status:    Tobacco Use    Smoking status: Former     Current packs/day: 0.00     Average packs/day: 1 pack/day for 12.0 years (12.0 ttl pk-yrs)     Types: Cigarettes     Start date: 1970     Quit date: 1981     Years since quittin.1     Passive exposure: Past    Smokeless tobacco: Never    Tobacco comments:     only smoked occasionally, not daily   Vaping Use    Vaping status: Never Used   Substance and Sexual Activity    Alcohol use: Not Currently     Comment: 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    Drug use: Never    Sexual activity: Not Currently         ALLERGIES  Codeine and Ozempic (0.25 or 0.5 mg-dose) [semaglutide(0.25 or 0.5mg-dos)]        REVIEW OF SYSTEMS  Review of Systems     All systems reviewed and negative except for those discussed in HPI.       PHYSICAL EXAM    I have reviewed the triage vital signs and nursing notes.    ED Triage Vitals [25 0050]   Temp Heart Rate Resp BP SpO2   98.3 °F (36.8 °C) 72 18 156/56 96 %      Temp src Heart Rate Source Patient Position BP Location FiO2 (%)   Oral Monitor Sitting Left arm --       Physical  Exam    General:  Awake, alert, no acute distress  HEENT: Atraumatic, normocephalic, EOMI, PERRLA, mucous membranes moist  NECK:  Supple, atraumatic  Cardiovascular:  Regular rate, regular rhythm, no murmurs, rubs, or gallops.  Extremities well perfused   Respiratory:  Regular rate, clear lungs to auscultation bilaterally.  No rhonchi, rales, wheezing  Abdominal:  Soft, nondistended, nontender.  No guarding or rebound.  No palpable masses  Extremity:  No visible bony abnormalities in all 4 extremities.   reduced range of motion of left hip secondary to pain.  Skin:  Warm and dry.  No rashes  Neuro:  AAOx3, GCS 15. Cranial nerves 2-12 grossly intact.  No focal strength or sensation deficits.  Psych:  Mood and affect appropriate.        LAB RESULTS  No results found for this or any previous visit (from the past 24 hours).    If labs were ordered, I independently evaluated the results and considered them in treating the patient.        RADIOLOGY  No Radiology Exams Resulted Within Past 24 Hours    I ordered and independently evaluated the above noted radiographic studies.     See radiologist's dictation for official interpretation.        PROCEDURES    Procedures    No orders to display       MEDICATIONS GIVEN IN ER    Medications   ketorolac (TORADOL) injection 30 mg (30 mg Intramuscular Given 2/5/25 0127)         MEDICAL DECISION MAKING, PROGRESS, and CONSULTS    All labs, if obtained, have been independently reviewed by me.  All radiology studies, if obtained, have been evaluated by me and the radiologist dictating the report.  All EKG's, if obtained, have been independently viewed and interpreted by me.      Discussion below represents my analysis of pertinent findings related to patient's condition, differential diagnosis, treatment plan and final disposition.    Neetu Wick is a 69 y.o. female who presents to the ED c/o hip pain.  Hemodynamically stable on arrival.  Having significant pain in left hip after a  fall over 1 week ago.  Has been ambulatory since then and already had x-rays at an outpatient that were reportedly negative for fracture or dislocation, but did show evidence of degenerative disc disease in the lower spine/sacrum.  Potential for sciatica flare contributing to patient's left hip pain versus missed fracture or contusion on differential.  Offered patient CT of pelvis however patient declined initially requesting pain control instead of any further imaging at this time.  Given IM Toradol 30 mg.    On repeat assessment patient had significant improvement of pain.  With improvement of range of motion of left hip.  Patient declining any imaging at this time.  Advised on strict return precautions and the need for follow-up with primary care provider.  Patient agreeable with this plan and was discharged.                                 Orders placed during this visit:  No orders of the defined types were placed in this encounter.        ED Course:    Consultants: None                Shared Decision Making:  After my consideration of clinical presentation and any laboratory/radiology studies obtained, I discussed the findings with the patient/patient representative who is in agreement with the treatment plan and the final disposition.   Risks and benefits of discharge and/or observation/admission were discussed.      AS OF 02:49 EST VITALS:    BP - 150/77  HR - 75  TEMP - 98.3 °F (36.8 °C) (Oral)  O2 SATS - 94%                  DIAGNOSIS  Final diagnoses:   Sciatica of left side   Left hip pain         DISPOSITION  Discharge      Please note that portions of this document were completed with voice recognition software.        Dustin Tena MD  02/05/25 7434

## 2025-02-05 NOTE — ED NOTES
Pt had no further questions following discharge teaching. Pt left ambulatory by family vehicle and is a&o x4.

## 2025-02-11 ENCOUNTER — TELEMEDICINE (OUTPATIENT)
Dept: INTERNAL MEDICINE | Facility: CLINIC | Age: 70
End: 2025-02-11
Payer: MEDICARE

## 2025-02-11 VITALS — WEIGHT: 266 LBS | HEART RATE: 72 BPM | HEIGHT: 67 IN | BODY MASS INDEX: 41.75 KG/M2

## 2025-02-11 DIAGNOSIS — M62.830 MUSCLE SPASM OF BACK: ICD-10-CM

## 2025-02-11 DIAGNOSIS — M25.552 PAIN IN JOINT OF LEFT HIP: Primary | ICD-10-CM

## 2025-02-11 DIAGNOSIS — M25.552 PAIN IN JOINT OF LEFT HIP: ICD-10-CM

## 2025-02-11 DIAGNOSIS — I10 BENIGN ESSENTIAL HYPERTENSION: Primary | ICD-10-CM

## 2025-02-11 PROCEDURE — 1160F RVW MEDS BY RX/DR IN RCRD: CPT | Performed by: INTERNAL MEDICINE

## 2025-02-11 PROCEDURE — 99214 OFFICE O/P EST MOD 30 MIN: CPT | Performed by: INTERNAL MEDICINE

## 2025-02-11 PROCEDURE — 1125F AMNT PAIN NOTED PAIN PRSNT: CPT | Performed by: INTERNAL MEDICINE

## 2025-02-11 PROCEDURE — G2211 COMPLEX E/M VISIT ADD ON: HCPCS | Performed by: INTERNAL MEDICINE

## 2025-02-11 PROCEDURE — 1159F MED LIST DOCD IN RCRD: CPT | Performed by: INTERNAL MEDICINE

## 2025-02-11 PROCEDURE — 3044F HG A1C LEVEL LT 7.0%: CPT | Performed by: INTERNAL MEDICINE

## 2025-02-11 RX ORDER — CYCLOBENZAPRINE HCL 10 MG
10 TABLET ORAL NIGHTLY PRN
Qty: 30 TABLET | Refills: 3 | Status: SHIPPED | OUTPATIENT
Start: 2025-02-11

## 2025-02-11 RX ORDER — TRAMADOL HYDROCHLORIDE 50 MG/1
50 TABLET ORAL NIGHTLY PRN
Qty: 10 TABLET | Refills: 0 | Status: SHIPPED | OUTPATIENT
Start: 2025-02-11

## 2025-02-11 NOTE — PROGRESS NOTES
You have chosen to receive care through a video visit. Do you consent to use a video  visit for your medical care today? Yes  Parties active with  visit  Physician: Ines Lino MD  Nurse: Char Hernandez MA  Patient : xi lewis  Location of Provider :  [place], [place]  Location of Patient : at home  Patient   was seen via telehealth using real-time video conferencing technology   This service was conducted via  Video Visit  The patient has signed the video visit consent form.  The visit included audio and video interaction. No technical issues occurred during this visit      Chief Complaint  Hip Pain (ER f/u. R/2/5/25) and Hypertension    Subjective        Xi Lewis presents to [facility]  History of Present Illness  HPI: Patient is following up on the blood pressure  The patient is taking the blood pressure medications as prescribed and has had no side effects. The patient is also following up on  left hip  and radiating in to the left leg ,  she states she has no back pain but does has left paraspinal discomfort and was given muscle relaxant that don't help much , she fell on in the hallway at home after slipping on the puppy pad and felt forwards,  she was seen in the clinic and er  , she fell on 1- , she had xrays and lab work done.  The patient also needs refills on medications .  Her  er records from 2/5/2025 ,  Lab reports  and Radiology reports have been reviewed  Today and medications reconciled and updated .    Hypertension   Pertinent negatives include no chest pain, palpitations or shortness of breath.        This is a video enabled telemedicine encounter  During today's visit, I reviewed the documented allergies, medications, chief complaint, and pertinent vitals.  I have confirmed with the patient that there have been no changes since this information was discussed with my clinical team member.    Objective   Vital Signs:  "afebrile  Pulse 72   Ht 170.2 cm (67\")   Wt 121 kg (266 lb)   BMI 41.66 kg/m²   Estimated body mass index is 41.66 kg/m² as calculated from the following:    Height as of this encounter: 170.2 cm (67\").    Weight as of this encounter: 121 kg (266 lb).            Physical Exam   All vitals recorded within this visit are reported by the patient.  Physical Examination  Vitals and nursing note reviewed  General appearance: Normocephalic and nontraumatic  HEENT: External inspection of eyes, ears and nose appears benign, hearing appears intact  Neck: Neck appears supple, trachea in midline, thyroid appears not enlarged  Respiratory: Respiratory effort appears normal  Musculoskeletal: Moving all limbs  Range of motion of visible joints appears within normal, left hip pain  CNS: No gross motor or sensory deficits  No gross cranial nerve deficits  No tremors  Psychiatry: Alert and oriented x3  Memory appears intact  Mood and affect appears normal  Insight appears normal    Result Review :  The following data was reviewed by: Ines Hancock MD on 02/11/2025:  CMP          9/19/2024    13:37 1/23/2025    10:19   CMP   Glucose 106  111    BUN 10  14    Creatinine 0.75  0.86    Sodium 140  140    Potassium 4.0  3.9    Chloride 106  104    Calcium 9.0  8.9    Total Protein 6.3  6.8    Albumin 4.0  3.8    Globulin 2.3  3.0    Total Bilirubin 0.7  0.9    Alkaline Phosphatase 113  114    AST (SGOT) 17  15    ALT (SGPT) 14  11    BUN/Creatinine Ratio 13.3  16.3           ED with Dustin Tena MD (02/05/2025)    XR sacrum and coccyx (01/31/2025 09:39)  XR Hip With or Without Pelvis 2 - 3 View Left (01/31/2025 09:39)  Assessment and Plan   Diagnoses and all orders for this visit:    1. Benign essential hypertension (Primary)    2. Pain in joint of left hip  -     Ambulatory Referral to Orthopedic Surgery  -     traMADol (ULTRAM) 50 MG tablet; Take 1 tablet by mouth At Night As Needed for Severe Pain.  Dispense: 10 tablet; Refill: " 0    3. Muscle spasm of back  -     cyclobenzaprine (FLEXERIL) 10 MG tablet; Take 1 tablet by mouth At Night As Needed for Muscle Spasms.  Dispense: 30 tablet; Refill: 3      Plan:  1.  Benign essential hypertension: Will continue current medication, low-sodium diet advised, Counseled to regularly check BP at home with goal averaging <130/80.   2.  Pain in left hip: Will refer patient to orthopedist, as needed tramadol  3.  Muscle spasm: As needed muscle relaxant         Follow Up     Patient was given instructions and counseling regarding her condition or for health maintenance advice. Please see specific information pulled into the AVS if appropriate.     Mode of Visit: Video  Location of patient: -HOME-  Location of provider: [facility]  You have chosen to receive care through a telehealth visit.  The patient has signed the video visit consent form.  The visit included audio and video interaction. No technical issues occurred during this visit.

## 2025-02-13 ENCOUNTER — OFFICE VISIT (OUTPATIENT)
Dept: INTERNAL MEDICINE | Facility: CLINIC | Age: 70
End: 2025-02-13
Payer: MEDICARE

## 2025-02-13 VITALS
RESPIRATION RATE: 20 BRPM | SYSTOLIC BLOOD PRESSURE: 180 MMHG | BODY MASS INDEX: 42.06 KG/M2 | WEIGHT: 268 LBS | OXYGEN SATURATION: 99 % | TEMPERATURE: 98.5 F | HEIGHT: 67 IN | HEART RATE: 70 BPM | DIASTOLIC BLOOD PRESSURE: 63 MMHG

## 2025-02-13 DIAGNOSIS — S73.102A THIGH SPRAIN, LEFT, INITIAL ENCOUNTER: ICD-10-CM

## 2025-02-13 DIAGNOSIS — I10 BENIGN ESSENTIAL HYPERTENSION: Primary | ICD-10-CM

## 2025-02-13 DIAGNOSIS — M25.552 PAIN IN JOINT OF LEFT HIP: ICD-10-CM

## 2025-02-13 DIAGNOSIS — M62.830 MUSCLE SPASM OF BACK: ICD-10-CM

## 2025-02-13 PROCEDURE — 99214 OFFICE O/P EST MOD 30 MIN: CPT | Performed by: INTERNAL MEDICINE

## 2025-02-13 PROCEDURE — 1125F AMNT PAIN NOTED PAIN PRSNT: CPT | Performed by: INTERNAL MEDICINE

## 2025-02-13 PROCEDURE — 1159F MED LIST DOCD IN RCRD: CPT | Performed by: INTERNAL MEDICINE

## 2025-02-13 PROCEDURE — 1160F RVW MEDS BY RX/DR IN RCRD: CPT | Performed by: INTERNAL MEDICINE

## 2025-02-13 PROCEDURE — 3044F HG A1C LEVEL LT 7.0%: CPT | Performed by: INTERNAL MEDICINE

## 2025-02-13 PROCEDURE — G2211 COMPLEX E/M VISIT ADD ON: HCPCS | Performed by: INTERNAL MEDICINE

## 2025-02-13 PROCEDURE — 96372 THER/PROPH/DIAG INJ SC/IM: CPT | Performed by: INTERNAL MEDICINE

## 2025-02-13 RX ORDER — METHYLPREDNISOLONE SODIUM SUCCINATE 125 MG/2ML
125 INJECTION INTRAMUSCULAR; INTRAVENOUS ONCE
Status: COMPLETED | OUTPATIENT
Start: 2025-02-13 | End: 2025-02-13

## 2025-02-13 RX ADMIN — METHYLPREDNISOLONE SODIUM SUCCINATE 125 MG: 125 INJECTION INTRAMUSCULAR; INTRAVENOUS at 16:16

## 2025-02-13 NOTE — PROGRESS NOTES
"Chief Complaint  Hypertension, Back Pain, and Joint Pain (Left hip)    Subjective        Neetu Wick presents to [facility]    HPI: Patient is here to follow up on the blood pressure  The patient is taking the blood pressure medications as prescribed and has had no side effects. The patient is also here  complaining of fall and  now has pain in the left hip , left groin, left buttock and pulling sensation in the left posterior thigh area, she was seen in the clinic in the ER and had x-rays done and the records have been reviewed , she has an appointment to see orthopedist, she also complains of muscle spasm  Hypertension   Pertinent negatives include no chest pain, palpitations or shortness of breath.    Objective   Vital Signs:  /63   Pulse 70   Temp 98.5 °F (36.9 °C) (Temporal)   Resp 20   Ht 170.2 cm (67\")   Wt 122 kg (268 lb)   SpO2 99%   BMI 41.97 kg/m²   Estimated body mass index is 41.97 kg/m² as calculated from the following:    Height as of this encounter: 170.2 cm (67\").    Weight as of this encounter: 122 kg (268 lb).            Physical Exam  Vitals and nursing note reviewed.   Constitutional:       General: She is not in acute distress.     Appearance: Normal appearance. She is not diaphoretic.   HENT:      Head: Normocephalic and atraumatic.      Right Ear: External ear normal.      Left Ear: External ear normal.      Nose: Nose normal.   Eyes:      Extraocular Movements: Extraocular movements intact.      Conjunctiva/sclera: Conjunctivae normal.   Neck:      Trachea: Trachea normal.   Cardiovascular:      Rate and Rhythm: Normal rate and regular rhythm.      Heart sounds: Normal heart sounds.   Pulmonary:      Effort: Pulmonary effort is normal. No respiratory distress.      Breath sounds: Normal breath sounds.   Abdominal:      General: Abdomen is flat.   Musculoskeletal:         General: Tenderness and deformity present.      Cervical back: Neck supple. "      Comments: Moves all limbs   Skin:     General: Skin is warm.   Neurological:      Mental Status: She is alert and oriented to person, place, and time.      Comments: No gross motor or sensory deficits        Result Review :  The following data was reviewed by: Ines Hancock MD on 02/13/2025:  CMP          9/19/2024    13:37 1/23/2025    10:19   CMP   Glucose 106  111    BUN 10  14    Creatinine 0.75  0.86    EGFR 85.2  72.3    Sodium 140  140    Potassium 4.0  3.9    Chloride 106  104    Calcium 9.0  8.9    Total Protein 6.3  6.8    Albumin 4.0  3.8    Globulin 2.3  3.0    Total Bilirubin 0.7  0.9    Alkaline Phosphatase 113  114    AST (SGOT) 17  15    ALT (SGPT) 14  11    Albumin/Globulin Ratio 1.7  1.3    BUN/Creatinine Ratio 13.3  16.3           ED with Dustin Tena MD (02/05/2025)    XR Hip With or Without Pelvis 2 - 3 View Left (01/31/2025 09:39)  XR sacrum and coccyx (01/31/2025 09:39)  Assessment and Plan   Diagnoses and all orders for this visit:    1. Benign essential hypertension (Primary)    2. Pain in joint of left hip  -     methylPREDNISolone sodium succinate (SOLU-Medrol) injection 125 mg    3. Thigh sprain, left, initial encounter  -     methylPREDNISolone sodium succinate (SOLU-Medrol) injection 125 mg    4. Muscle spasm of back    Plan:  1.  Benign essential hypertension: Will continue current medication, low-sodium diet advised, Counseled to regularly check BP at home with goal averaging <130/80.   2.  Left hip joint pain: IM Solu-Medrol given in office today, follow-up with orthopedist  3.  Thighs pain: IM Solu-Medrol given in office today, follow-up with orthopedist  4.  Muscle spasm: As needed muscle relaxant           Follow Up     Patient was given instructions and counseling regarding her condition or for health maintenance advice. Please see specific information pulled into the AVS if appropriate.

## 2025-02-25 ENCOUNTER — OFFICE VISIT (OUTPATIENT)
Dept: ORTHOPEDIC SURGERY | Facility: CLINIC | Age: 70
End: 2025-02-25
Payer: MEDICARE

## 2025-02-25 ENCOUNTER — HOSPITAL ENCOUNTER (OUTPATIENT)
Dept: ULTRASOUND IMAGING | Facility: HOSPITAL | Age: 70
Discharge: HOME | End: 2025-02-25
Admitting: INTERNAL MEDICINE
Payer: MEDICARE

## 2025-02-25 VITALS
HEIGHT: 67 IN | SYSTOLIC BLOOD PRESSURE: 140 MMHG | BODY MASS INDEX: 41.59 KG/M2 | DIASTOLIC BLOOD PRESSURE: 82 MMHG | WEIGHT: 265 LBS

## 2025-02-25 DIAGNOSIS — E66.01 MORBID OBESITY WITH BMI OF 40.0-44.9, ADULT: ICD-10-CM

## 2025-02-25 DIAGNOSIS — S76.012A MUSCLE STRAIN OF GLUTEAL REGION, LEFT, INITIAL ENCOUNTER: ICD-10-CM

## 2025-02-25 DIAGNOSIS — S76.312A HAMSTRING STRAIN, LEFT, INITIAL ENCOUNTER: ICD-10-CM

## 2025-02-25 DIAGNOSIS — M76.32 IT BAND SYNDROME, LEFT: Primary | ICD-10-CM

## 2025-02-25 DIAGNOSIS — M70.62 GREATER TROCHANTERIC BURSITIS OF LEFT HIP: ICD-10-CM

## 2025-02-25 PROCEDURE — 76700 US EXAM ABDOM COMPLETE: CPT

## 2025-02-25 RX ORDER — LIDOCAINE HYDROCHLORIDE 20 MG/ML
2 INJECTION, SOLUTION INFILTRATION; PERINEURAL
Status: COMPLETED | OUTPATIENT
Start: 2025-02-25 | End: 2025-02-25

## 2025-02-25 RX ORDER — METHYLPREDNISOLONE ACETATE 40 MG/ML
40 INJECTION, SUSPENSION INTRA-ARTICULAR; INTRALESIONAL; INTRAMUSCULAR; SOFT TISSUE
Status: COMPLETED | OUTPATIENT
Start: 2025-02-25 | End: 2025-02-25

## 2025-02-25 RX ADMIN — LIDOCAINE HYDROCHLORIDE 2 ML: 20 INJECTION, SOLUTION INFILTRATION; PERINEURAL at 16:27

## 2025-02-25 RX ADMIN — METHYLPREDNISOLONE ACETATE 40 MG: 40 INJECTION, SUSPENSION INTRA-ARTICULAR; INTRALESIONAL; INTRAMUSCULAR; SOFT TISSUE at 16:27

## 2025-02-25 NOTE — PROGRESS NOTES
Subjective   Patient ID: Neetu Wick is a 69 y.o. right hand dominant female is being seen for orthopaedic evaluation today for left hip pain  Pain of the Left Hip (Reports one month ago slipping on a puppy pad and falliing onto hands and knees and extended left leg. )         Pain     Patient presents for evaluation of anteriolateral left hip pain.  Slipped on a puppy pad 1 month prior ( with the left leg hyper-extended)-No direct blunt trauma to left hip) which caused the symptoms. Since the fall, the symptoms have slightly improved.   Bending over increases the pain.          Past Medical History:   Diagnosis Date    Afib     Arthritis     Colon polyp 12/03/2021    Depression     Dyslipidemia     Ear piercing     Fracture     right ankle    Fracture, radius 11/04/2015    left    H/O echocardiogram     2016    History of nuclear stress test     2016    Hypertension     Incontinence     Murmur     Obesity     Ovarian failure     Panic attacks     Pedal edema     PONV (postoperative nausea and vomiting)     Seasonal allergies     Sleep apnea     Does not wear machine.    Urinary tract infection     Varicose vein of leg     Venous insufficiency     Vitamin D deficiency         Past Surgical History:   Procedure Laterality Date    ABDOMINAL HYSTERECTOMY  1987    Total    BILATERAL SALPINGO OOPHORECTOMY  1998    BLADDER SUSPENSION  2009    COLONOSCOPY  08/03/2015    COLONOSCOPY N/A 12/03/2021    Procedure: COLONOSCOPY with polypectomy x9;  Surgeon: Adelso Plummer MD;  Location: [facility] ENDOSCOPY;  Service: Gastroenterology;  Laterality: N/A;    ENDOSCOPY      ENDOVENOUS ABLATION SAPHENOUS VEIN W/ LASER Bilateral 2015    OOPHORECTOMY Bilateral 1987    ORIF ANKLE FRACTURE Right     ORIF WRIST FRACTURE Left 2013    unknown of surgeon    WISDOM TOOTH EXTRACTION         Family History   Problem Relation Age of Onset    Hypertension Mother     Obesity Mother     Arthritis Mother     Hypertension Father     Dementia  "Father     Atrial fibrillation Father     Obesity Sister     Heart disease Sister     Hypertension Sister     Stroke Sister     Thyroid disease Sister     Cancer Maternal Grandmother         breast    Breast cancer Maternal Grandmother 80    Cancer Maternal Aunt         breast    Breast cancer Maternal Aunt         DX AGE MID 30'S    Colon cancer Neg Hx     Ovarian cancer Neg Hx         Social History     Socioeconomic History    Marital status:    Tobacco Use    Smoking status: Former     Current packs/day: 0.00     Average packs/day: 1 pack/day for 12.0 years (12.0 ttl pk-yrs)     Types: Cigarettes     Start date: 1970     Quit date: 1981     Years since quittin.1     Passive exposure: Past    Smokeless tobacco: Never    Tobacco comments:     only smoked occasionally, not daily   Vaping Use    Vaping status: Never Used   Substance and Sexual Activity    Alcohol use: Not Currently     Comment: 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    Drug use: Never    Sexual activity: Not Currently       Allergies   Allergen Reactions    Codeine Itching    Ozempic (0.25 Or 0.5 Mg-Dose) [Semaglutide(0.25 Or 0.5mg-Dos)] Other (See Comments)     Diarrhea, nausea       Review of Systems   Musculoskeletal:  Positive for arthralgias (left hip).       Objective   /82   Ht 170.2 cm (67\")   Wt 120 kg (265 lb)   BMI 41.50 kg/m²   Physical Exam  Vitals and nursing note reviewed.   Constitutional:       Appearance: Normal appearance.   Pulmonary:      Effort: Pulmonary effort is normal.   Musculoskeletal:      Left hip: Tenderness present. No deformity. Normal strength.   Neurological:      Mental Status: She is alert and oriented to person, place, and time.       Left Hip Exam     Tenderness   The patient is experiencing tenderness in the greater trochanter, lateral and posterior.    Range of Motion   Abduction:  30   Adduction:  25   Flexion:  80     Tests   OMAR: positive  Deepali: positive  Fadir:  Positive FADIR test    Other "   Erythema: absent  Sensation: normal          Extremity DVT signs are negative by clinical screen.   Neurological Exam  Mental Status  Alert. Oriented to person, place, and time.         Assessment & Plan   Independent Review of Radiographic Studies:    No new imaging done today.  Reviewed images and agree with radiologist interpretation.      Large Joint Arthrocentesis: L greater trochanteric bursa  Date/Time: 2/25/2025 4:27 PM  Consent given by: patient  Site marked: site marked  Timeout: Immediately prior to procedure a time out was called to verify the correct patient, procedure, equipment, support staff and site/side marked as required   Supporting Documentation  Indications: pain   Procedure Details  Location: hip - L greater trochanteric bursa  Preparation: Patient was prepped and draped in the usual sterile fashion  Needle size: 22 G  Approach: lateral  Medications administered: 40 mg methylPREDNISolone acetate 40 MG/ML; 2 mL lidocaine 2%  Patient tolerance: patient tolerated the procedure well with no immediate complications          Diagnoses and all orders for this visit:    1. It band syndrome, left (Primary)  -     Ambulatory Referral to Physical Therapy for Evaluation & Treatment    2. Hamstring strain, left, initial encounter  -     Ambulatory Referral to Physical Therapy for Evaluation & Treatment    3. Muscle strain of gluteal region, left, initial encounter  -     Ambulatory Referral to Physical Therapy for Evaluation & Treatment    4. Greater trochanteric bursitis of left hip    Other orders  -     Large Joint Arthrocentesis: L greater trochanteric bursa       Discussion of orthopedic goals  Orthopedic activities reviewed and patient expressed appreciation  Regular exercise as tolerated  Risk, benefits, and merits of treatment alternatives reviewed with the patient and questions answered  Ice, heat, and/or modalities as beneficial  Call or notify for any adverse effect from injection  therapy    Recommendations/Plan:  Exercise, medications, injections, other patient advice, and return appointment as noted.  Patient is encouraged to call or return for any issues or concerns.  May use warm heat and/or ice packs as needed to the painful area.  If no improvement in 4 to 6 weeks with physical therapy and today's cortisone injection of the left greater trochanter bursa we will order MRI of the left thigh.  Patient verbalized her understanding and is content with the plan of care.  Return in about 6 weeks (around 4/8/2025).  Patient agreeable to call or return sooner for any concerns.

## 2025-02-28 DIAGNOSIS — N28.89 RENAL MASS: Primary | ICD-10-CM

## 2025-03-01 DIAGNOSIS — N28.9 RENAL LESION: Primary | ICD-10-CM

## 2025-03-03 ENCOUNTER — TELEPHONE (OUTPATIENT)
Dept: GASTROENTEROLOGY | Facility: CLINIC | Age: 70
End: 2025-03-03
Payer: MEDICARE

## 2025-03-03 NOTE — TELEPHONE ENCOUNTER
----- Message from Esther CAO sent at 3/3/2025  8:27 AM EST -----    ----- Message -----  From: Adelso Plummer MD  Sent: 3/1/2025  12:37 PM EST  To: [facility]      Let her know that I have placed the urology referral for her for further evaluation of her abnormal CT finding showing left kidney lesion  ----- Message -----  From: Clement, Rad Results [place] In  Sent: 2/25/2025   9:13 AM EST  To: Adelso Plummer MD

## 2025-03-08 RX ORDER — ALENDRONATE SODIUM 70 MG/1
70 TABLET ORAL
Qty: 12 TABLET | Refills: 3 | Status: SHIPPED | OUTPATIENT
Start: 2025-03-08

## 2025-03-17 ENCOUNTER — HOSPITAL ENCOUNTER (OUTPATIENT)
Dept: CT IMAGING | Facility: HOSPITAL | Age: 70
Discharge: HOME | End: 2025-03-17
Admitting: INTERNAL MEDICINE
Payer: MEDICARE

## 2025-03-17 PROCEDURE — 74177 CT ABD & PELVIS W/CONTRAST: CPT

## 2025-03-17 PROCEDURE — 25510000001 IOPAMIDOL 61 % SOLUTION: Performed by: INTERNAL MEDICINE

## 2025-03-17 RX ORDER — IOPAMIDOL 612 MG/ML
100 INJECTION, SOLUTION INTRAVASCULAR
Status: COMPLETED | OUTPATIENT
Start: 2025-03-17 | End: 2025-03-17

## 2025-03-17 RX ADMIN — IOPAMIDOL 100 ML: 612 INJECTION, SOLUTION INTRAVENOUS at 09:31

## 2025-03-28 NOTE — PROGRESS NOTES
"Chief Complaint  Hypertension, Leg Swelling, Cough, Earache, Nasal Congestion, and Sinus Problem    Subjective          Neetu Wick presents to [facility]  History of Present Illness  HPI: Patient is here to follow up on the blood pressure  The patient is taking the blood pressure medications as prescribed and has had no side effects.  patient is also to follow-up on her leg swelling , she was put on Lasix 40 mg but she started having a headache and could not tolerate it so she broke it into half and she has been able to tolerate the 20 mg has helped her swelling, patient also complains of ear pain , nasal discharge   And sinus congestion since the past few days, patient has been trying over the counter medications with not much relief in the symptoms , patient also complains of cough due to the drainage, she needs a flu vaccine  Objective   Vital Signs:   /79   Pulse 59   Temp 96.2 °F (35.7 °C)   Resp 16   Ht 170.2 cm (67.01\")   Wt 119 kg (262 lb)   SpO2 97%   BMI 41.03 kg/m²     Physical Exam  Vitals and nursing note reviewed.   Constitutional:       General: She is not in acute distress.     Appearance: Normal appearance. She is not diaphoretic.   HENT:      Head: Normocephalic and atraumatic.      Right Ear: External ear normal. Tympanic membrane is injected.      Left Ear: External ear normal. Tympanic membrane is injected.      Nose: Rhinorrhea present.      Right Sinus: Maxillary sinus tenderness present.      Left Sinus: Maxillary sinus tenderness present.   Eyes:      Extraocular Movements: Extraocular movements intact.      Conjunctiva/sclera: Conjunctivae normal.   Neck:      Trachea: Trachea normal.   Cardiovascular:      Rate and Rhythm: Normal rate and regular rhythm.      Heart sounds: Normal heart sounds.   Pulmonary:      Effort: Pulmonary effort is normal. No respiratory distress.      Breath sounds: Normal breath sounds.   Abdominal:      General: " 5w4d,  OB Patient was seen in the ED today for abdominal pain in early pregnancy. Was advised to have repeat beta hcg testing and an ultrasound in 1 week.     Recommend continued close clinical follow-up and   serial serum beta hCG levels, with repeat ultrasound in 7 days or earlier if clinically indicated.   Abdomen is flat.   Musculoskeletal:         General: Deformity present.      Cervical back: Neck supple.      Right lower leg: Edema present.      Left lower leg: Edema present.      Comments: Moves all limbs   Skin:     General: Skin is warm.   Neurological:      Mental Status: She is alert and oriented to person, place, and time.      Comments: No gross motor or sensory deficits        Result Review :     Common labs    Common Labsle 5/13/21 5/13/21 5/13/21 5/13/21 9/3/21 9/3/21 9/3/21 9/3/21    1010 1010 1010 1010 1101 1101 1101 1101   Glucose  74    105 (A)     BUN  13    17     Creatinine  0.79    0.76     eGFR Non African Am  73    76     eGFR  Am  88    92     Sodium  145    139     Potassium  4.7    4.1     Chloride  108 (A)    106     Calcium  8.9    9.0     Total Protein  6.4    6.1     Albumin  4.00    4.10     Total Bilirubin  0.6    0.8     Alkaline Phosphatase  103    108     AST (SGOT)  11    17     ALT (SGPT)  8    14     WBC 4.88    6.18      Hemoglobin 12.7    12.5      Hematocrit 38.5    38.2      Platelets 260    272      Total Cholesterol   135    140    Triglycerides   73    72    HDL Cholesterol   60    57    LDL Cholesterol    60    69    Hemoglobin A1C    6.00 (A)    6.00 (A)   (A) Abnormal value       Comments are available for some flowsheets but are not being displayed.                     Assessment and Plan    Diagnoses and all orders for this visit:    1. Benign essential hypertension (Primary)    2. Localized edema    3. Acute suppurative otitis media of both ears without spontaneous rupture of tympanic membranes, recurrence not specified  -     cephalexin (KEFLEX) 500 MG capsule; Take 1 capsule by mouth 3 (Three) Times a Day for 10 days.  Dispense: 30 capsule; Refill: 0    4. Acute recurrent maxillary sinusitis  -     cephalexin (KEFLEX) 500 MG capsule; Take 1 capsule by mouth 3 (Three) Times a Day for 10 days.  Dispense: 30 capsule; Refill: 0    5. Cough    6. Need for  immunization against influenza  -     Fluzone High-Dose 65+yrs (7651-5005)    Other orders  -     benzonatate (TESSALON) 200 MG capsule; Take 1 capsule by mouth 3 (Three) Times a Day As Needed for Cough for up to 10 days.  Dispense: 30 capsule; Refill: 0     Plan :   1.Benign Essential Hypertension: will  continue current medications, low sodium diet, patient to continue to monitor  blood pressure  2 .edema: Advised to continue Lasix 20 mg daily with potassium, low-sodium diet  3.Acute suppurative otitis media: will   start oral antibiotics    4.Acute maxillary sinusitis:   will   start oral antibiotics   5.  Cough: OTC cough medication and prescribed Tessalon Perles  6.  Need for flu vaccine: Given today  I spent 30 minutes caring for Neetu on this date of service. This time includes time spent by me in the following activities:preparing for the visit, reviewing tests, performing a medically appropriate examination and/or evaluation , counseling and educating the patient/family/caregiver, ordering medications, tests, or procedures and documenting information in the medical record  Follow Up    Patient was given instructions and counseling regarding her condition or for health maintenance advice. Please see specific information pulled into the AVS if appropriate.

## 2025-04-23 NOTE — PRE-PROCEDURE INSTRUCTIONS
PAT phone history completed with patient for upcoming procedure on 4/28/25.    PAT PASS reviewed with patient and he/she verbalized understanding of the following:     Do not eat or drink anything after midnight the night before procedure unless otherwise instructed by physician/surgeon's office, this includes no gum, candy, mints, tobacco products or e-cigarettes.  Do not shave the area to be operated on at least 48 hours prior to procedure.  Do not wear makeup, lotion, hair products, or nail polish.  Do not wear any jewelry and remove all piercings.  Do not wear any adhesive if you wear dentures.  Do not wear contacts; bring in glasses if needed.  Only take medications on the morning of procedure as instructed by PAT nurse per anesthesia guidelines or as instructed by physician's office.   If you are on any blood thinners reach out to the physician/surgeon's office for instructions on when/if they will need to be stopped prior to procedure.   Bring in picture ID and insurance card, advanced directive copies if applicable, CPAP/BIPAP/Inhalers if indicated morning of procedure, leave any other valuables at home.  Ensure you have arranged for someone to drive you home the day of your procedure and someone to care for you at home afterwards. It is recommended that you do not drive, drink alcohol, or make any major legal decisions for at least 24 hours after your procedure is complete.  Refer to Dr. Plummer's instructions regarding low fiber and clear liquid diets; and bowel prep instructions.     Instructions given on hospital entrance and registration location.

## 2025-04-28 ENCOUNTER — HOSPITAL ENCOUNTER (OUTPATIENT)
Facility: HOSPITAL | Age: 70
Setting detail: HOSPITAL OUTPATIENT SURGERY
Discharge: HOME | End: 2025-04-28
Attending: INTERNAL MEDICINE | Admitting: INTERNAL MEDICINE
Payer: MEDICARE

## 2025-04-28 ENCOUNTER — ANESTHESIA EVENT (OUTPATIENT)
Dept: GASTROENTEROLOGY | Facility: HOSPITAL | Age: 70
End: 2025-04-28
Payer: MEDICARE

## 2025-04-28 ENCOUNTER — ANESTHESIA (OUTPATIENT)
Dept: GASTROENTEROLOGY | Facility: HOSPITAL | Age: 70
End: 2025-04-28
Payer: MEDICARE

## 2025-04-28 VITALS
BODY MASS INDEX: 41.59 KG/M2 | DIASTOLIC BLOOD PRESSURE: 70 MMHG | HEART RATE: 72 BPM | OXYGEN SATURATION: 95 % | SYSTOLIC BLOOD PRESSURE: 143 MMHG | HEIGHT: 67 IN | WEIGHT: 265 LBS | RESPIRATION RATE: 20 BRPM | TEMPERATURE: 97.1 F

## 2025-04-28 DIAGNOSIS — Z86.0101 PERSONAL HISTORY OF ADENOMATOUS AND SERRATED COLON POLYPS: ICD-10-CM

## 2025-04-28 PROCEDURE — 25010000002 FENTANYL CITRATE (PF) 50 MCG/ML SOLUTION: Performed by: NURSE ANESTHETIST, CERTIFIED REGISTERED

## 2025-04-28 PROCEDURE — 25010000002 PROPOFOL 10 MG/ML EMULSION: Performed by: NURSE ANESTHETIST, CERTIFIED REGISTERED

## 2025-04-28 RX ORDER — LIDOCAINE HCL/PF 100 MG/5ML
SYRINGE (ML) INJECTION AS NEEDED
Status: DISCONTINUED | OUTPATIENT
Start: 2025-04-28 | End: 2025-04-28 | Stop reason: SURG

## 2025-04-28 RX ORDER — FENTANYL CITRATE 0.05 MG/ML
INJECTION, SOLUTION INTRAMUSCULAR; INTRAVENOUS AS NEEDED
Status: DISCONTINUED | OUTPATIENT
Start: 2025-04-28 | End: 2025-04-28 | Stop reason: SURG

## 2025-04-28 RX ORDER — SODIUM CHLORIDE 9 MG/ML
50 INJECTION, SOLUTION INTRAVENOUS CONTINUOUS
Status: DISCONTINUED | OUTPATIENT
Start: 2025-04-28 | End: 2025-04-28 | Stop reason: HOSPADM

## 2025-04-28 RX ORDER — SIMETHICONE 40MG/0.6ML
SUSPENSION, DROPS(FINAL DOSAGE FORM)(ML) ORAL AS NEEDED
Status: DISCONTINUED | OUTPATIENT
Start: 2025-04-28 | End: 2025-04-28 | Stop reason: HOSPADM

## 2025-04-28 RX ADMIN — PROPOFOL 100 MCG/KG/MIN: 10 INJECTION, EMULSION INTRAVENOUS at 10:30

## 2025-04-28 RX ADMIN — Medication 100 MG: at 10:30

## 2025-04-28 RX ADMIN — FENTANYL CITRATE 50 MCG: 0.05 INJECTION, SOLUTION INTRAMUSCULAR; INTRAVENOUS at 10:30

## 2025-04-28 NOTE — DISCHARGE INSTRUCTIONS
- Discharge patient to home (ambulatory).   - High fiber diet.   - Continue present medications.   - Hold Xeralto for 3 days   - Await pathology results.   - Repeat colonoscopy in 3 - 5 years for surveillance.   - Return to GI office in 8 weeks.     Rest today  No pushing,pulling,tugging,heavy lifting, or strenuous activity   No major decision making,driving,or drinking alcoholic beverages for 24 hours due to the sedation you received  Always use good hand hygiene/washing technique  No driving on pain medication.    To assist you in voiding:  Drink plenty of fluids  Listen to running water while attempting to void.    If you are unable to urinate and you have an uncomfortable urge to void or it has been   6 hours since you were discharged, return to the Emergency Room.

## 2025-04-28 NOTE — ANESTHESIA POSTPROCEDURE EVALUATION
Patient: Neetu Wick    Procedure Summary       Date: 04/28/25 Room / Location: [facility] ENDOSCOPY 2 / [facility] ENDOSCOPY    Anesthesia Start: 1030 Anesthesia Stop: 1059    Procedure: Colonoscopy with biopsies and polypectomies x6 (Anus) Diagnosis:       Personal history of adenomatous and serrated colon polyps      (Personal history of adenomatous and serrated colon polyps [Z86.0101])    Surgeons: Adelso Plummer MD Provider: Gonzalo Cedillo CRNA    Anesthesia Type: MAC ASA Status: 3            Anesthesia Type: MAC    Vitals  No vitals data found for the desired time range.          Post Anesthesia Care and Evaluation    Patient location during evaluation: bedside  Patient participation: complete - patient participated  Level of consciousness: awake  Pain score: 0  Pain management: adequate    Airway patency: patent  Anesthetic complications: No anesthetic complications  PONV Status: controlled  Cardiovascular status: acceptable and stable  Respiratory status: acceptable and room air  Hydration status: acceptable    Comments: Vital signs as noted in nursing documentation as per protocol

## 2025-04-28 NOTE — H&P
[facility]  HISTORY AND PHYSICAL    Patient Name: Neetu Wick  : 1955  MRN: [id number]    Chief Complaint:   For surveillance colonoscopy    History Of Presenting Illness:    Multiple adenomatous polyps removed   Change in bowel habit    Past Medical History:   Diagnosis Date    Afib     Arthritis     Colon polyp 2021    Depression     Dyslipidemia     Ear piercing     Fracture     right ankle    Fracture, radius 2015    left    H/O echocardiogram     2016    History of nuclear stress test     2016    Hypertension     Incontinence     Murmur     Obesity     Ovarian failure     Panic attacks     Pedal edema     PONV (postoperative nausea and vomiting)     one occurrence    Seasonal allergies     Sleep apnea     Does not wear machine.    Slow to wake up after anesthesia     following wrist surgery    Urinary tract infection     Varicose vein of leg     Venous insufficiency     Vitamin D deficiency        Past Surgical History:   Procedure Laterality Date    ABDOMINAL HYSTERECTOMY      Total    BILATERAL SALPINGO OOPHORECTOMY      BLADDER SUSPENSION      COLONOSCOPY  2015    COLONOSCOPY N/A 2021    Procedure: COLONOSCOPY with polypectomy x9;  Surgeon: Adelso Plummer MD;  Location: [facility] ENDOSCOPY;  Service: Gastroenterology;  Laterality: N/A;    ENDOSCOPY      ENDOVENOUS ABLATION SAPHENOUS VEIN W/ LASER Bilateral     OOPHORECTOMY Bilateral     ORIF ANKLE FRACTURE Right     ORIF WRIST FRACTURE Left     unknown of surgeon    WISDOM TOOTH EXTRACTION         Social History     Socioeconomic History    Marital status:    Tobacco Use    Smoking status: Former     Current packs/day: 0.00     Average packs/day: 1 pack/day for 12.0 years (12.0 ttl pk-yrs)     Types: Cigarettes     Start date: 1970     Quit date: 1981     Years since quittin.3     Passive exposure: Past    Smokeless tobacco: Never    Tobacco comments:      only smoked occasionally, not daily   Vaping Use    Vaping status: Never Used   Substance and Sexual Activity    Alcohol use: Not Currently     Comment: 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    Drug use: Never    Sexual activity: Not Currently       Family History   Problem Relation Age of Onset    Hypertension Mother     Obesity Mother     Arthritis Mother     Hypertension Father     Dementia Father     Atrial fibrillation Father     Obesity Sister     Heart disease Sister     Hypertension Sister     Stroke Sister     Thyroid disease Sister     Cancer Maternal Grandmother         breast    Breast cancer Maternal Grandmother 80    Cancer Maternal Aunt         breast    Breast cancer Maternal Aunt         DX AGE MID 30'S    Colon cancer Neg Hx     Ovarian cancer Neg Hx        Prior to Admission Medications:  Medications Prior to Admission   Medication Sig Dispense Refill Last Dose/Taking    alendronate (FOSAMAX) 70 MG tablet TAKE 1 TABLET BY MOUTH EVERY 7 (SEVEN) DAYS. 12 tablet 3 4/27/2025    atorvastatin (LIPITOR) 20 MG tablet Take 1 tablet by mouth Every Night. 90 tablet 1 4/27/2025    buPROPion XL (WELLBUTRIN XL) 300 MG 24 hr tablet Take 1 tablet by mouth Daily. 90 tablet 1 4/27/2025    clobetasol propionate (Temovate) 0.05 % cream Apply 1 Application topically to the appropriate area as directed 2 (Two) Times a Day. (Patient taking differently: Apply 1 Application topically to the appropriate area as directed As Needed.) 15 g 3 4/27/2025    clotrimazole (LOTRIMIN) 1 % cream Apply 1 Application topically to the appropriate area as directed Daily.   4/27/2025    dilTIAZem CD (CARDIZEM CD) 180 MG 24 hr capsule Take 1 capsule by mouth every night at bedtime. 90 capsule 1 4/27/2025    estradiol (ESTRACE) 0.5 MG tablet Take 1 tablet by mouth Daily. 90 tablet 3 4/27/2025    Loratadine 10 MG capsule Take 1 capsule by mouth Daily.   4/27/2025    metoprolol succinate XL (TOPROL-XL) 25 MG 24 hr tablet Take 1 tablet by mouth Daily. 90  tablet 1 4/27/2025    rivaroxaban (Xarelto) 20 MG tablet Take 1 tablet by mouth Every Night. 90 tablet 1 4/25/2025    cyclobenzaprine (FLEXERIL) 10 MG tablet Take 1 tablet by mouth At Night As Needed for Muscle Spasms. 30 tablet 3     traMADol (ULTRAM) 50 MG tablet Take 1 tablet by mouth At Night As Needed for Severe Pain. 10 tablet 0        Allergies:  Allergies   Allergen Reactions    Codeine Itching    Ozempic (0.25 Or 0.5 Mg-Dose) [Semaglutide(0.25 Or 0.5mg-Dos)] Other (See Comments)     Diarrhea, nausea        Vitals: Temp:  [97 °F (36.1 °C)] 97 °F (36.1 °C)  Heart Rate:  [70] 70  Resp:  [16] 16  BP: (174)/(80) 174/80    Review Of Systems:  Constitutional:  Negative for chills, fever, and unexpected weight change.  Respiratory:  Negative for cough, chest tightness, shortness of breath, and wheezing.  Cardiovascular:  Negative for chest pain, palpitations, and leg swelling.  Gastrointestinal:  Negative for abdominal distention, abdominal pain, nausea, vomiting.  Neurological:  Negative for weakness, numbness, and headaches.     Physical Exam:    General Appearance:  Alert, cooperative, in no acute distress.   Lungs:   Clear to auscultation, respirations regular, even and                 unlabored.   Heart:  Regular rhythm and normal rate.   Abdomen:   Normal bowel sounds, no masses, no organomegaly. Soft, nontender, nondistended   Neurologic: Alert and oriented x 3. Moves all four limbs equally       Assessment & Plan     Assessment:  Principal Problem:    Personal history of adenomatous and serrated colon polyps      Plan: Colonoscopy with  possible biopsy, polypectomy, ablation of arteriovenous malformations, or control of bleeding (N/A)     Adelso Plummer MD  4/28/2025

## 2025-04-28 NOTE — ANESTHESIA PREPROCEDURE EVALUATION
Anesthesia Evaluation     Patient summary reviewed and Nursing notes reviewed   history of anesthetic complications:  PONV  NPO Solid Status: > 8 hours  NPO Liquid Status: > 6 hours           Airway   Mallampati: II  TM distance: >3 FB  Neck ROM: full  Possible difficult intubation  Dental      Pulmonary    (+) ,sleep apnea  Cardiovascular     (+) hypertension, valvular problems/murmurs, dysrhythmias      Neuro/Psych  (+) psychiatric history  GI/Hepatic/Renal/Endo    (+) obesity, morbid obesity    Musculoskeletal     Abdominal    Substance History      OB/GYN          Other                      Anesthesia Plan    ASA 3     MAC     (Pt told that intravenous sedation will be used as the primary anesthetic along with local anesthesia if necessary. Every effort will be made to make sure the patient is comfortable.     The patient was told they may or may not have recall for the procedure. It was further explained that if the MAC was not adequate that a general anesthetic with either an LMA or endotracheal tube would be required.     Will proceed with the plan of care.)  intravenous induction     Anesthetic plan, risks, benefits, and alternatives have been provided, discussed and informed consent has been obtained with: patient.    Plan discussed with CRNA.

## 2025-04-30 LAB — REF LAB TEST METHOD: NORMAL

## 2025-05-19 ENCOUNTER — OFFICE VISIT (OUTPATIENT)
Dept: UROLOGY | Facility: CLINIC | Age: 70
End: 2025-05-19
Payer: MEDICARE

## 2025-05-19 VITALS
HEART RATE: 77 BPM | HEIGHT: 67 IN | SYSTOLIC BLOOD PRESSURE: 140 MMHG | WEIGHT: 265 LBS | OXYGEN SATURATION: 97 % | TEMPERATURE: 97.8 F | BODY MASS INDEX: 41.59 KG/M2 | DIASTOLIC BLOOD PRESSURE: 70 MMHG

## 2025-05-19 DIAGNOSIS — N39.41 URGE INCONTINENCE OF URINE: ICD-10-CM

## 2025-05-19 DIAGNOSIS — R39.9 LOWER URINARY TRACT SYMPTOMS (LUTS): Primary | ICD-10-CM

## 2025-05-19 LAB
BILIRUB BLD-MCNC: NEGATIVE MG/DL
CLARITY, POC: CLEAR
COLOR UR: ABNORMAL
EXPIRATION DATE: ABNORMAL
GLUCOSE UR STRIP-MCNC: NEGATIVE MG/DL
KETONES UR QL: NEGATIVE
LEUKOCYTE EST, POC: ABNORMAL
Lab: ABNORMAL
NITRITE UR-MCNC: NEGATIVE MG/ML
PH UR: 6.5 [PH] (ref 5–8)
PROT UR STRIP-MCNC: NEGATIVE MG/DL
RBC # UR STRIP: ABNORMAL /UL
SP GR UR: 1.01 (ref 1–1.03)
UROBILINOGEN UR QL: ABNORMAL

## 2025-05-19 PROCEDURE — 81003 URINALYSIS AUTO W/O SCOPE: CPT | Performed by: UROLOGY

## 2025-05-19 PROCEDURE — 1159F MED LIST DOCD IN RCRD: CPT | Performed by: UROLOGY

## 2025-05-19 PROCEDURE — G2211 COMPLEX E/M VISIT ADD ON: HCPCS | Performed by: UROLOGY

## 2025-05-19 PROCEDURE — 99204 OFFICE O/P NEW MOD 45 MIN: CPT | Performed by: UROLOGY

## 2025-05-19 PROCEDURE — 1160F RVW MEDS BY RX/DR IN RCRD: CPT | Performed by: UROLOGY

## 2025-05-19 RX ORDER — TRETINOIN 0.25 MG/G
CREAM TOPICAL
COMMUNITY
Start: 2025-03-05

## 2025-05-19 NOTE — PROGRESS NOTES
Office Visit     Patient Name: Neetu Wick  : 1955   MRN: [id number]     Chief Complaint  Chief Complaint   Patient presents with    Establish Care     Renal lesion        Referring Provider: Adelso Plummer, *    Primary Care Provider: Ines Hancock MD     Subjective     History of Present Illness  The patient is a 69-year-old female presenting with urge incontinence.    Urge Incontinence  - Reports urinary leakage disrupting sleep, causing 4-5 awakenings per night, leading to daytime fatigue and napping.  - No leakage with coughing, sneezing, or straining.  - Previously tried oxybutynin, discontinued due to side effects.    Supplemental information: Fluid intake includes occasional sodas, unsweetened tea, and coffee. Benign-appearing renal cyst. On Xarelto.    MEDICATIONS  Current: Xarelto  Past: Oxybutynin    +Urge incontinence     Past Medical History:   Diagnosis Date    Afib     Arthritis     Colon polyp 2021    Depression     Dyslipidemia     Ear piercing     Fracture     right ankle    Fracture of ankle see chart    Fracture, radius 2015    left    H/O echocardiogram     2016    History of nuclear stress test     2016    Hypertension     Incontinence     Murmur     Obesity     Ovarian failure     Panic attacks     Pedal edema     PONV (postoperative nausea and vomiting)     one occurrence    Seasonal allergies     Sleep apnea     Does not wear machine.    Slow to wake up after anesthesia     following wrist surgery    Urinary incontinence     Urinary tract infection     Varicose vein of leg     Venous insufficiency     Vitamin D deficiency      Past Surgical History:   Procedure Laterality Date    ABDOMINAL HYSTERECTOMY      Total    BILATERAL SALPINGO OOPHORECTOMY      BLADDER SUSPENSION      COLONOSCOPY  2015    COLONOSCOPY N/A 2021    Procedure: COLONOSCOPY with polypectomy x9;  Surgeon: Adelso Plummer MD;  Location: [facility] ENDOSCOPY;   Service: Gastroenterology;  Laterality: N/A;    COLONOSCOPY N/A 2025    Procedure: Colonoscopy with biopsies and polypectomies x6;  Surgeon: Adelso Plummer MD;  Location: [facility] ENDOSCOPY;  Service: Gastroenterology;  Laterality: N/A;    ENDOSCOPY      ENDOVENOUS ABLATION SAPHENOUS VEIN W/ LASER Bilateral 2015    FOOT SURGERY  see chart    OOPHORECTOMY Bilateral     ORIF ANKLE FRACTURE Right     ORIF WRIST FRACTURE Left 2013    unknown of surgeon    WISDOM TOOTH EXTRACTION      WRIST SURGERY  see chart     Family History   Problem Relation Age of Onset    Hypertension Mother     Obesity Mother     Arthritis Mother     Hypertension Father     Dementia Father     Atrial fibrillation Father     Obesity Sister     Heart disease Sister     Hypertension Sister     Stroke Sister     Thyroid disease Sister     Cancer Maternal Grandmother         breast    Breast cancer Maternal Grandmother 80    Cancer Maternal Aunt         breast    Breast cancer Maternal Aunt         DX AGE MID 30'S    Colon cancer Neg Hx     Ovarian cancer Neg Hx      Social History     Socioeconomic History    Marital status:    Tobacco Use    Smoking status: Former     Current packs/day: 0.00     Average packs/day: 1 pack/day for 12.0 years (12.0 ttl pk-yrs)     Types: Cigarettes     Start date: 1970     Quit date: 1981     Years since quittin.4     Passive exposure: Past    Smokeless tobacco: Never    Tobacco comments:     only smoked occasionally, not daily   Vaping Use    Vaping status: Never Used   Substance and Sexual Activity    Alcohol use: Not Currently     Comment: 42 years of sobriety    Drug use: Never    Sexual activity: Not Currently     Partners: Male     Birth control/protection: Diaphragm, Birth control pill       Current Outpatient Medications:     alendronate (FOSAMAX) 70 MG tablet, TAKE 1 TABLET BY MOUTH EVERY 7 (SEVEN) DAYS., Disp: 12 tablet, Rfl: 3    atorvastatin (LIPITOR) 20 MG tablet, Take  "1 tablet by mouth Every Night., Disp: 90 tablet, Rfl: 1    buPROPion XL (WELLBUTRIN XL) 300 MG 24 hr tablet, Take 1 tablet by mouth Daily., Disp: 90 tablet, Rfl: 1    clobetasol propionate (Temovate) 0.05 % cream, Apply 1 Application topically to the appropriate area as directed 2 (Two) Times a Day. (Patient taking differently: Apply 1 Application topically to the appropriate area as directed As Needed.), Disp: 15 g, Rfl: 3    clotrimazole (LOTRIMIN) 1 % cream, Apply 1 Application topically to the appropriate area as directed Daily., Disp: , Rfl:     dilTIAZem CD (CARDIZEM CD) 180 MG 24 hr capsule, Take 1 capsule by mouth every night at bedtime., Disp: 90 capsule, Rfl: 1    estradiol (ESTRACE) 0.5 MG tablet, Take 1 tablet by mouth Daily., Disp: 90 tablet, Rfl: 3    Loratadine 10 MG capsule, Take 1 capsule by mouth Daily., Disp: , Rfl:     metoprolol succinate XL (TOPROL-XL) 25 MG 24 hr tablet, Take 1 tablet by mouth Daily., Disp: 90 tablet, Rfl: 1    rivaroxaban (Xarelto) 20 MG tablet, Take 1 tablet by mouth Every Night., Disp: 90 tablet, Rfl: 1    tretinoin (RETIN-A) 0.025 % cream, APPLY TO FACE ONCE DAILY AT BEDTIME, Disp: , Rfl:   Allergies   Allergen Reactions    Codeine Itching    Ozempic (0.25 Or 0.5 Mg-Dose) [Semaglutide(0.25 Or 0.5mg-Dos)] Other (See Comments)     Diarrhea, nausea     Objective   Visit Vitals  /70 (BP Location: Left arm, Patient Position: Sitting, Cuff Size: Adult)   Pulse 77   Temp 97.8 °F (36.6 °C) (Temporal)   Ht 170.2 cm (67.01\")   Wt 120 kg (265 lb)   SpO2 97%   BMI 41.49 kg/m²        Body mass index is 41.49 kg/m².     Physical exam was notable for: obese     Labs  Lab Results   Component Value Date    COLORU Straw 05/19/2025    CLARITYU Clear 05/19/2025    SPECGRAV 1.010 05/19/2025    PHUR 6.5 05/19/2025    LEUKOCYTESUR Small (1+) (A) 05/19/2025    NITRITE Negative 05/19/2025    PROTEINPOCUA Negative 05/19/2025    GLUCOSEUR Negative 05/19/2025    KETONESU Negative 05/19/2025    " "UROBILINOGEN 0.2 E.U./dL 05/19/2025    BILIRUBINUR Negative 05/19/2025    RBCUR Trace (A) 05/19/2025      No results found for: \"WBCUA\", \"RBCUA\", \"BACTERIA\", \"HYALCASTU\", \"SQUAMEPIUA\"     Lab Results   Component Value Date    WBC 4.88 01/23/2025    HGB 12.9 01/23/2025    HCT 41.3 01/23/2025    MCV 95.6 01/23/2025     01/23/2025     Lab Results   Component Value Date    GLUCOSE 111 (H) 01/23/2025    CALCIUM 8.9 01/23/2025     01/23/2025    K 3.9 01/23/2025    CO2 27.0 01/23/2025     01/23/2025    BUN 14 01/23/2025    BUN 10 09/19/2024    CREATININE 0.86 01/23/2025    CREATININE 0.75 09/19/2024    EGFR 72.3 01/23/2025    EGFR 85.2 09/19/2024    BCR 16.3 01/23/2025    ANIONGAP 11 07/26/2019    ALT 11 01/23/2025    AST 15 01/23/2025       Lab Results   Component Value Date    HGBA1C 6.20 (H) 01/23/2025        No results found for: \"URICACIDSTN\", \"NPUL6UASSFL\", \"PSOI5SNQEE\", \"LABMAGN\"  Lab Results   Component Value Date    BKFY75CI 30.6 07/26/2019     No results found for: \"CYSTINE\", \"URINEVOLUM\", \"CALCIUMUR\", \"OXALATEU\", \"CITRATEUR\", \"LABPH\", \"URICUR\", \"NAUR\", \"KUR\", \"MAGNESIUMUR\", \"PHOSUR\", \"AMMONIUMUR\", \"CLUR\", \"GDFSCZRLF35P\", \"UREAUR\", \"LABCREAUR\"    No results found for: \"ATOPOBIUMV\", \"BVAB2\", \"MEGASPHAER\", \"CALBICANSN\", \"CGLABRATAN\", \"CPARAPSILOS\", \"CLUSITANIAE\", \"CKRUSEI\", \"TRICHVAG\", \"CHLAMNAA\", \"NGONORRHON\", \"UREAPLASMA\", \"MYCOPLASMAG\"    Lab Results   Component Value Date    TSH 2.080 05/13/2021    WMCB33HB 30.6 07/26/2019         Radiographic Studies  CT Abdomen Pelvis With Contrast  Result Date: 3/19/2025  Impression: 1. Simple cyst within the liver. 2. Cyst within the left kidney. No suspicious solid renal mass. 3. No acute process seen within the abdomen or pelvis. Electronically Signed: Carlos Vora MD  3/19/2025 3:48 PM EDT  Workstation ID: [id number]    US Abdomen Complete  Result Date: 2/25/2025  Mild hepatomegaly with fatty infiltration.  Cystic lesion in the inferior pole of the " left kidney, not definitely a simple cyst. A renal mass protocol CT is recommended      Images were reviewed, interpreted, and dictated by Dr. Velma Chong MD Transcribed by Yumiko Lee PA-C.  This report was signed and finalized on 2/25/2025 9:11 AM by Velma Chong MD.      XR Hip With or Without Pelvis 2 - 3 View Left  Result Date: 2/4/2025  1. Mild left hip joint space narrowing, but no evidence of advanced degenerative change, acute or healing bony trauma. SACRUM AND COCCYX: There is exaggeration of the normal lumbar lordosis, and grade 1 anterior subluxation of L5 on S1, but no obvious pars defects. Superior endplate compression deformities are noted of L4 and L3, although with no particular acute features. There is smooth anterior angulation of the coccyx, which appears to be a normal variant. No sacral or coccygeal fracture is identified. AP view shows intact appearing sacral foraminal lines, and normally aligned SI joints. IMPRESSION: No visible sacral or coccygeal fracture. Incidentally noted lumbar degenerative changes, and grade 1 anterior subluxation of L5. Electronically Signed: Daron Tipton MD  2/4/2025 9:10 AM EST  Workstation ID: [id number]    XR sacrum and coccyx  Result Date: 2/4/2025  1. Mild left hip joint space narrowing, but no evidence of advanced degenerative change, acute or healing bony trauma. SACRUM AND COCCYX: There is exaggeration of the normal lumbar lordosis, and grade 1 anterior subluxation of L5 on S1, but no obvious pars defects. Superior endplate compression deformities are noted of L4 and L3, although with no particular acute features. There is smooth anterior angulation of the coccyx, which appears to be a normal variant. No sacral or coccygeal fracture is identified. AP view shows intact appearing sacral foraminal lines, and normally aligned SI joints. IMPRESSION: No visible sacral or coccygeal fracture. Incidentally noted lumbar degenerative changes, and grade 1 anterior  subluxation of L5. Electronically Signed: Daron Tipton MD  2/4/2025 9:10 AM EST  Workstation ID: [id number]      I have reviewed the above labs and imaging.      Assessment / Plan      Diagnoses and all orders for this visit:    1. Lower urinary tract symptoms (LUTS) (Primary)  -     POC Urinalysis Dipstick, Automated       Assessment & Plan  1. Urge incontinence  - Frequent nocturia causing sleep disruption and daytime fatigue  - Previously tried oxybutynin with side effects  - Discussed reducing soda, tea, and coffee intake  - Discussed Botox and InterStim; patient interested in InterStim  - Scheduled InterStim test for 06/13/2025  - Advised to discontinue Xarelto 2 days prior  - Will provide InterStim information packet    2. Benign renal cyst  - Reassured patient; common and typically not concerning.  Benign appearing         Patient or patient representative verbalized consent for the use of Ambient Listening during the visit with  Radu Molina MD for chart documentation. 5/19/2025  12:21 EDT     Radu Molina MD  Urologic Surgery  [address]   [place] [address]

## 2025-06-10 NOTE — PRE-PROCEDURE INSTRUCTIONS
PMH updated for upcoming interstim eval with Dr. Molina on 6/13/25. Patient's questions answered, pt verbalized understanding of instructions.

## 2025-06-13 ENCOUNTER — HOSPITAL ENCOUNTER (OUTPATIENT)
Dept: PREOP | Facility: HOSPITAL | Age: 70
Discharge: HOME | End: 2025-06-13
Payer: MEDICARE

## 2025-06-13 VITALS
OXYGEN SATURATION: 95 % | RESPIRATION RATE: 16 BRPM | HEART RATE: 77 BPM | DIASTOLIC BLOOD PRESSURE: 62 MMHG | SYSTOLIC BLOOD PRESSURE: 168 MMHG | TEMPERATURE: 97.2 F

## 2025-06-13 DIAGNOSIS — N39.41 URGE INCONTINENCE OF URINE: Primary | ICD-10-CM

## 2025-06-13 PROCEDURE — A9270 NON-COVERED ITEM OR SERVICE: HCPCS | Performed by: UROLOGY

## 2025-06-13 PROCEDURE — 25010000002 LIDOCAINE 1% - EPINEPHRINE 1:100000 1 %-1:100000 SOLUTION: Performed by: UROLOGY

## 2025-06-13 PROCEDURE — 63710000001 SULFAMETHOXAZOLE-TRIMETHOPRIM 800-160 MG TABLET: Performed by: UROLOGY

## 2025-06-13 RX ORDER — LIDOCAINE HYDROCHLORIDE AND EPINEPHRINE 10; 10 MG/ML; UG/ML
40 INJECTION, SOLUTION INFILTRATION; PERINEURAL ONCE
Status: COMPLETED | OUTPATIENT
Start: 2025-06-13 | End: 2025-06-13

## 2025-06-13 RX ORDER — SULFAMETHOXAZOLE AND TRIMETHOPRIM 800; 160 MG/1; MG/1
1 TABLET ORAL EVERY 12 HOURS SCHEDULED
Status: COMPLETED | OUTPATIENT
Start: 2025-06-13 | End: 2025-06-13

## 2025-06-13 RX ADMIN — SULFAMETHOXAZOLE AND TRIMETHOPRIM 1 TABLET: 800; 160 TABLET ORAL at 09:33

## 2025-06-13 RX ADMIN — LIDOCAINE HYDROCHLORIDE AND EPINEPHRINE 40 ML: 10; 10 INJECTION, SOLUTION INFILTRATION; PERINEURAL at 11:10

## 2025-06-13 NOTE — DISCHARGE INSTRUCTIONS
Home Care After Interstim Test Leads  The following instructions will help you care for yourself, or be cared for upon your return home today.  These are guidelines for your care right after the procedure only.     Diet  Continue your regular diet today.    Activity  Start normal activities in twenty-four (24) hours  Try to avoid any extreme physical activity while the leads are in.    Wound Care and Hygiene  Front showering or sponge bath is ok    What to Expect after Procedure  Soreness over the needle site  Mild bleeding at the needle site.    Call your Doctor  Severe pain not controlled by oral medication  Temperature above 101.5 degrees  Inability to urinate within eight (8) hours after surgery  Drainage from the incision    Other Contacts  Urology Office:  [id number]   [place] [address]  [phone] office  [phone] fax    Follow up Appointment  You have a follow up scheduled with Dr. Molina's APRN next week

## 2025-06-13 NOTE — PROCEDURES
Procedure Note     SURGEON: Radu Molina MD    PREOPERATIVE DIAGNOSIS: Refractory Urge Urinary Incontinence     POSTOPERATIVE DIAGNOSIS: Same     PROCEDURE PERFORMED:   1. Basic Evaluation (PNE) without fluoroscopy using the enhanced Verify™ System - (CPT Code: 40343-30), bilateral lead placement    ANESTHESIA: Lidocaine 2%     BLOOD LOSS: Minimal.     SPECIMEN: None.     COMPLICATION: None.     INDICATION FOR PROCEDURE: Pt Neetu Wick is a 69 y.o. y.o.-year-old with Refractory Overactive bladder / UUI.  After discussion a decision was made to proceed with a trial of InterStim placement with basic evaluation.     DESCRIPTION OF THE PROCEDURE:   The Patient was properly identified and placed in prone position. Pillows were placed under lower abdomen to flatten sacrum and under shins to allow the toes to dangle freely. A ground pad was placed on the bottom of the patient’s foot and the proximal ends of the test stimulation cable were connected to the ground pad and the external neurostimulator (ENS). Patient was prepped and draped in usual sterile fashion.   The sciatic notches and sacral midline were identified via palpation. The level of S3 was identified by measuring 9cm from the tip of the coccyx. Local injection of 2% Lidocaine was administered at foramen needle entry point located 2cm lateral to the sacral midline and 2cm cephalad of sciatic notch level. A foramen needle was introduced at an approximate 60 degree angle, feeling for the foraminal margins until S3 was identified. Proper needle position was confirmed by the patient identifying location of stimulation sensation; and direct observation of the lifting of the perineum or “bellowing,” and plantar flexion of the great toe utilizing the test stimulation cable, ENS and Verify™ .     The foramen needle stylet was removed, and a percutaneous lead was inserted to proper depth identified by markers on the lead. The lead was tested by  connecting the test stimulation cable to the proximal lead electrode and testing with the ENS and . Upon response confirmation, the foramen needle was removed by sliding over the percutaneous lead. The foramen needle and lead stylet were removed while securing lead location. Retesting to confirm appropriate lead response was completed.   The above procedure was performed again on the contralateral side.     The leads were connected to the patient cables. The Patient’s skin was cleaned and external cabling was secured by covering with transparent dressing. Estimated blood loss was minimal. Post procedure, the patient was programmed with the ENS and Verify™  to optimum sensation via the lead and provided utilization instructions prior to discharge. Patient will complete a bladder diary during testing period to help document results of this procedure. The patient will follow up in 1 week for full interstim implantation if they have >50% benefit.

## 2025-06-19 ENCOUNTER — OFFICE VISIT (OUTPATIENT)
Dept: UROLOGY | Facility: CLINIC | Age: 70
End: 2025-06-19
Payer: MEDICARE

## 2025-06-19 VITALS
BODY MASS INDEX: 41.59 KG/M2 | HEART RATE: 78 BPM | OXYGEN SATURATION: 100 % | WEIGHT: 265 LBS | HEIGHT: 67 IN | DIASTOLIC BLOOD PRESSURE: 72 MMHG | TEMPERATURE: 98.4 F | SYSTOLIC BLOOD PRESSURE: 140 MMHG

## 2025-06-19 DIAGNOSIS — N39.41 URGE INCONTINENCE OF URINE: Primary | ICD-10-CM

## 2025-06-19 NOTE — PROGRESS NOTES
PNE Lead Removal     Patient Name: Neetu Wick  : 1955   MRN: [id number]     Chief Complaint:   Chief Complaint   Patient presents with    Follow-up     Lead removal        Referring Provider: No ref. provider found    History of Present Illness  The patient presents for urinary issues.    Urinary Issues  -5 with improvements in urinary symptoms with PNE trial patient wishes to schedule surgery  - She was informed symptoms may return without the device and expresses readiness for surgery  - She is on Xarelto for atrial fibrillation, previously discontinued 2 days prior to a procedure without complications       Subjective      Review of System:   As noted in HPI.    Past Medical History:   Past Medical History:   Diagnosis Date    Afib     Arthritis     Colon polyp 2021    Depression     Dyslipidemia     Ear piercing     Fracture     right ankle    Fracture of ankle see chart    Fracture, radius 2015    left    H/O echocardiogram     2016    History of nuclear stress test     2016    Hypertension     Incontinence     Murmur     Obesity     Ovarian failure     Panic attacks     Pedal edema     PONV (postoperative nausea and vomiting)     one occurrence    Seasonal allergies     Sleep apnea     Does not wear machine.    Slow to wake up after anesthesia     following wrist surgery    Urinary incontinence     Urinary tract infection     Varicose vein of leg     Venous insufficiency     Vitamin D deficiency        Past Surgical History:   Past Surgical History:   Procedure Laterality Date    ABDOMINAL HYSTERECTOMY      Total    BILATERAL SALPINGO OOPHORECTOMY  1998    BLADDER SUSPENSION      COLONOSCOPY  2015    COLONOSCOPY N/A 2021    Procedure: COLONOSCOPY with polypectomy x9;  Surgeon: Adelso Plummer MD;  Location: [facility] ENDOSCOPY;  Service: Gastroenterology;  Laterality: N/A;    COLONOSCOPY N/A 2025    Procedure: Colonoscopy with biopsies and  Assessment/Plan   Problem List Items Addressed This Visit       Controlled type 2 diabetes mellitus without complication, without long-term current use of insulin (Multi) (Chronic)     Advised would be due in 9 months for Dm annual.          Refraction error     New Rx for glasses/SCL given per patient request. Patient's signature obtained to acknowledge and confirm that a paper copy of glasses/SCL Rx was given to patient in compliance with [facility] Eyeglass Rule. Electronic copy of Rx will also be available via [employer].            Bilateral pseudophakia - Primary     Stable appearing intraocular lens (IOL) right eye (OD), no significant scar formation at this time, will monitor with serial exam.             Provided reassurance regarding above diagnoses and care received in the office visit today. Discussed outcomes and options along with the importance of treatment compliance. Understands the importance of any follow up visits. Patient instructed to call/communicate with our office if any new issues, questions, or concerns.     Will plan to see back in 9 months full or sooner PRN       polypectomies x6;  Surgeon: Adelso Plummer MD;  Location: [facility] ENDOSCOPY;  Service: Gastroenterology;  Laterality: N/A;    ENDOSCOPY      ENDOVENOUS ABLATION SAPHENOUS VEIN W/ LASER Bilateral     FOOT SURGERY  see chart    OOPHORECTOMY Bilateral     ORIF ANKLE FRACTURE Right     ORIF WRIST FRACTURE Left 2013    unknown of surgeon    PERCUTANEOUS NERVE EVALUATION  2025    WISDOM TOOTH EXTRACTION      WRIST SURGERY  see chart       Family History:   Family History   Problem Relation Age of Onset    Hypertension Mother     Obesity Mother     Arthritis Mother     Hypertension Father     Dementia Father     Atrial fibrillation Father     Obesity Sister     Heart disease Sister     Hypertension Sister     Stroke Sister     Thyroid disease Sister     Cancer Maternal Grandmother         breast    Breast cancer Maternal Grandmother 80    Cancer Maternal Aunt         breast    Breast cancer Maternal Aunt         DX AGE MID 30'S    Colon cancer Neg Hx     Ovarian cancer Neg Hx        Social History:   Social History     Socioeconomic History    Marital status:    Tobacco Use    Smoking status: Former     Current packs/day: 0.00     Average packs/day: 1 pack/day for 12.0 years (12.0 ttl pk-yrs)     Types: Cigarettes     Start date: 1970     Quit date: 1981     Years since quittin.4     Passive exposure: Past    Smokeless tobacco: Never    Tobacco comments:     only smoked occasionally, not daily   Vaping Use    Vaping status: Never Used   Substance and Sexual Activity    Alcohol use: Not Currently     Comment: 42 years of sobriety    Drug use: Never    Sexual activity: Not Currently     Partners: Male     Birth control/protection: Diaphragm, Birth control pill       Medications:     Current Outpatient Medications:     alendronate (FOSAMAX) 70 MG tablet, TAKE 1 TABLET BY MOUTH EVERY 7 (SEVEN) DAYS., Disp: 12 tablet, Rfl: 3    atorvastatin (LIPITOR) 20 MG tablet, Take 1 tablet by mouth  "Every Night., Disp: 90 tablet, Rfl: 1    buPROPion XL (WELLBUTRIN XL) 300 MG 24 hr tablet, Take 1 tablet by mouth Daily., Disp: 90 tablet, Rfl: 1    clobetasol propionate (Temovate) 0.05 % cream, Apply 1 Application topically to the appropriate area as directed 2 (Two) Times a Day. (Patient taking differently: Apply 1 Application topically to the appropriate area as directed As Needed.), Disp: 15 g, Rfl: 3    clotrimazole (LOTRIMIN) 1 % cream, Apply 1 Application topically to the appropriate area as directed Daily., Disp: , Rfl:     dilTIAZem CD (CARDIZEM CD) 180 MG 24 hr capsule, Take 1 capsule by mouth every night at bedtime., Disp: 90 capsule, Rfl: 1    estradiol (ESTRACE) 0.5 MG tablet, Take 1 tablet by mouth Daily., Disp: 90 tablet, Rfl: 3    Loratadine 10 MG capsule, Take 1 capsule by mouth Daily., Disp: , Rfl:     metoprolol succinate XL (TOPROL-XL) 25 MG 24 hr tablet, Take 1 tablet by mouth Daily., Disp: 90 tablet, Rfl: 1    rivaroxaban (Xarelto) 20 MG tablet, Take 1 tablet by mouth Every Night., Disp: 90 tablet, Rfl: 1    tretinoin (RETIN-A) 0.025 % cream, APPLY TO FACE ONCE DAILY AT BEDTIME, Disp: , Rfl:     Allergies:   Allergies   Allergen Reactions    Codeine Itching    Ozempic (0.25 Or 0.5 Mg-Dose) [Semaglutide(0.25 Or 0.5mg-Dos)] Other (See Comments)     Diarrhea, nausea       Objective     Physical Exam:   Vital Signs:   Visit Vitals  /72 (BP Location: Right arm, Patient Position: Sitting, Cuff Size: Adult)   Pulse 78   Temp 98.4 °F (36.9 °C) (Temporal)   Ht 170.2 cm (67.01\")   Wt 120 kg (265 lb)   SpO2 100%   BMI 41.49 kg/m²      Body mass index is 41.49 kg/m².     Sacrum: Leads in place at this time.  No s/s of infection.  Dressing CDI.       Assessment / Plan      Assessment/Plan:   There are no diagnoses linked to this encounter.   Assessment & Plan  1. Urinary issues  - Urgency resolved and leakage ceased, indicating significant improvement  - Discontinue Xarelto 3 days prior to surgery   "     Battery disconnected, leads removed intact, no notable bleeding, 2x2 bandage with paper tape placed over site.  Patient can return to normal activity and resume showering  Greater than 50 % improvement in symptoms achieved during trial  Awake voids decreased 8->7  Sleep voids decreased 5->2  Leaks in 24 hs decreased 3->0  Pads in 24 hrs decreased 2->0    Patient is consented for surgery, we discussed risks of surgery include infection, bleeding, damage to surrounding organs, and anesthesia complications. All questions answered and patient wishes to proceed.     Schedule Interstim stage I and II implant with Dr. Molina    Surgical concerns: atrial fibrillation, blood thinners, BMI 41.42     Patient or patient representative verbalized consent for the use of Ambient Listening during the visit with  LOBO Bingham for chart documentation. 6/19/2025  10:00 EDT     Mickey Marte, MSN, APRN, FNP-C  [facility] [place]    [place]

## 2025-07-02 ENCOUNTER — OFFICE VISIT (OUTPATIENT)
Dept: INTERNAL MEDICINE | Facility: CLINIC | Age: 70
End: 2025-07-02
Payer: MEDICARE

## 2025-07-02 VITALS
BODY MASS INDEX: 42.19 KG/M2 | WEIGHT: 268.8 LBS | SYSTOLIC BLOOD PRESSURE: 188 MMHG | RESPIRATION RATE: 20 BRPM | HEIGHT: 67 IN | TEMPERATURE: 97.9 F | DIASTOLIC BLOOD PRESSURE: 84 MMHG | HEART RATE: 81 BPM | OXYGEN SATURATION: 97 %

## 2025-07-02 DIAGNOSIS — E78.2 MIXED HYPERLIPIDEMIA: ICD-10-CM

## 2025-07-02 DIAGNOSIS — I10 BENIGN ESSENTIAL HYPERTENSION: Primary | ICD-10-CM

## 2025-07-02 DIAGNOSIS — I48.20 CHRONIC ATRIAL FIBRILLATION: ICD-10-CM

## 2025-07-02 DIAGNOSIS — R73.01 IMPAIRED FASTING GLUCOSE: ICD-10-CM

## 2025-07-02 DIAGNOSIS — L03.115 CELLULITIS OF RIGHT LEG WITHOUT FOOT: ICD-10-CM

## 2025-07-02 DIAGNOSIS — R60.0 LEG EDEMA: ICD-10-CM

## 2025-07-02 DIAGNOSIS — F33.0 MILD EPISODE OF RECURRENT MAJOR DEPRESSIVE DISORDER: ICD-10-CM

## 2025-07-02 RX ORDER — ALENDRONATE SODIUM 70 MG/1
70 TABLET ORAL
Qty: 12 TABLET | Refills: 3 | Status: SHIPPED | OUTPATIENT
Start: 2025-07-02

## 2025-07-02 RX ORDER — BUPROPION HYDROCHLORIDE 300 MG/1
300 TABLET ORAL DAILY
Qty: 90 TABLET | Refills: 1 | Status: SHIPPED | OUTPATIENT
Start: 2025-07-02

## 2025-07-02 RX ORDER — POTASSIUM CHLORIDE 750 MG/1
10 TABLET, EXTENDED RELEASE ORAL DAILY
Qty: 30 TABLET | Refills: 4 | Status: SHIPPED | OUTPATIENT
Start: 2025-07-02

## 2025-07-02 RX ORDER — ATORVASTATIN CALCIUM 20 MG/1
20 TABLET, FILM COATED ORAL NIGHTLY
Qty: 90 TABLET | Refills: 1 | Status: SHIPPED | OUTPATIENT
Start: 2025-07-02

## 2025-07-02 RX ORDER — TORSEMIDE 20 MG/1
20 TABLET ORAL DAILY
Qty: 30 TABLET | Refills: 4 | Status: SHIPPED | OUTPATIENT
Start: 2025-07-02

## 2025-07-02 RX ORDER — METOPROLOL SUCCINATE 25 MG/1
25 TABLET, EXTENDED RELEASE ORAL DAILY
Qty: 90 TABLET | Refills: 1 | Status: SHIPPED | OUTPATIENT
Start: 2025-07-02

## 2025-07-02 RX ORDER — DILTIAZEM HYDROCHLORIDE 180 MG/1
180 CAPSULE, COATED, EXTENDED RELEASE ORAL
Qty: 90 CAPSULE | Refills: 1 | Status: SHIPPED | OUTPATIENT
Start: 2025-07-02

## 2025-07-02 NOTE — PROGRESS NOTES
"Chief Complaint  Hypertension, Leg Swelling (Right leg pain and swelling since June ), Hyperlipidemia, and Urinary Incontinence    Subjective        Neetu Wick presents to [facility]  HPI: Patient is here to follow up on the blood pressure   , she states she has not taken her medications today, the patient is also here to follow up on the cholesterol and   is  due to get lab work done .  The patient also needs refills on medications .  Patient also complains of leg swelling in her right leg is warm  to touch and red, patient is also following up on urinary incontinence and is scheduled for InterStim placement  Hyperlipidemia   Pertinent negatives include no chest pain or shortness of breath.   Hypertension   Pertinent negatives include no chest pain, palpitations or shortness of breath.      Objective   Vital Signs:  BP (!) 188/84   Pulse 81   Temp 97.9 °F (36.6 °C) (Temporal)   Resp 20   Ht 170.2 cm (67\")   Wt 122 kg (268 lb 12.8 oz)   SpO2 97%   BMI 42.10 kg/m²   Estimated body mass index is 42.1 kg/m² as calculated from the following:    Height as of this encounter: 170.2 cm (67\").    Weight as of this encounter: 122 kg (268 lb 12.8 oz).        Vitals:    07/02/25 1526 07/02/25 1600   BP: (!) 187/121 (!) 188/84   Pulse: 81    Resp: 20    Temp: 97.9 °F (36.6 °C)    TempSrc: Temporal    SpO2: 97%    Weight: 122 kg (268 lb 12.8 oz)    Height: 170.2 cm (67\")    PainSc: 7     PainLoc: Leg  Comment: right            Physical Exam  Vitals and nursing note reviewed.   Constitutional:       General: She is not in acute distress.     Appearance: Normal appearance. She is obese. She is not diaphoretic.   HENT:      Head: Normocephalic and atraumatic.      Right Ear: External ear normal.      Left Ear: External ear normal.      Nose: Nose normal.   Eyes:      Extraocular Movements: Extraocular movements intact.      Conjunctiva/sclera: Conjunctivae normal.   Neck:      Thyroid: No " thyromegaly.   Cardiovascular:      Rate and Rhythm: Normal rate and regular rhythm.      Heart sounds: Normal heart sounds.   Pulmonary:      Effort: Pulmonary effort is normal.      Breath sounds: Normal breath sounds.   Abdominal:      General: Abdomen is flat.   Musculoskeletal:      Cervical back: Neck supple.      Right lower leg: Edema present.      Left lower leg: Edema present.        Legs:       Comments: Compression stocking bilateral lower extremities    Right leg cellulitis   Skin:     General: Skin is warm.      Findings: No erythema.      Comments: Right leg cellulitis   Neurological:      Mental Status: She is alert and oriented to person, place, and time.      Comments: No gross  motor or sensory deficits        Result Review :  The following data was reviewed by: Ines Hancock MD on 07/02/2025:  Common labs          9/19/2024    13:37 1/23/2025    10:19   Common Labs   Glucose 106  111    BUN 10  14    Creatinine 0.75  0.86    Sodium 140  140    Potassium 4.0  3.9    Chloride 106  104    Calcium 9.0  8.9    Albumin 4.0  3.8    Total Bilirubin 0.7  0.9    Alkaline Phosphatase 113  114    AST (SGOT) 17  15    ALT (SGPT) 14  11    WBC 4.08  4.88    Hemoglobin 12.6  12.9    Hematocrit 39.0  41.3    Platelets 265  246    Total Cholesterol 125  152    Triglycerides 88  86    HDL Cholesterol 55  62    LDL Cholesterol  53  74    Hemoglobin A1C 6.30  6.20    Microalbumin, Urine 41.9  34.3                Assessment and Plan   Diagnoses and all orders for this visit:    1. Benign essential hypertension (Primary)  -     metoprolol succinate XL (TOPROL-XL) 25 MG 24 hr tablet; Take 1 tablet by mouth Daily.  Dispense: 90 tablet; Refill: 1    2. Mixed hyperlipidemia  -     CBC (No Diff)  -     Comprehensive Metabolic Panel  -     Lipid Panel  -     atorvastatin (LIPITOR) 20 MG tablet; Take 1 tablet by mouth Every Night.  Dispense: 90 tablet; Refill: 1    3. Impaired fasting glucose  -     Hemoglobin A1c    4. Leg  edema  -     torsemide (DEMADEX) 20 MG tablet; Take 1 tablet by mouth Daily.  Dispense: 30 tablet; Refill: 4  -     potassium chloride (KLOR-CON M10) 10 MEQ CR tablet; Take 1 tablet by mouth Daily.  Dispense: 30 tablet; Refill: 4    5. Cellulitis of right leg without foot  -     amoxicillin-clavulanate (AUGMENTIN) 875-125 MG per tablet; Take 1 tablet by mouth 2 (Two) Times a Day for 10 days.  Dispense: 20 tablet; Refill: 0    6. Chronic atrial fibrillation  -     dilTIAZem CD (CARDIZEM CD) 180 MG 24 hr capsule; Take 1 capsule by mouth every night at bedtime.  Dispense: 90 capsule; Refill: 1  -     rivaroxaban (Xarelto) 20 MG tablet; Take 1 tablet by mouth Every Night.  Dispense: 90 tablet; Refill: 1    7. Mild episode of recurrent major depressive disorder  -     buPROPion XL (WELLBUTRIN XL) 300 MG 24 hr tablet; Take 1 tablet by mouth Daily.  Dispense: 90 tablet; Refill: 1    Other orders  -     alendronate (FOSAMAX) 70 MG tablet; Take 1 tablet by mouth Every 7 (Seven) Days.  Dispense: 12 tablet; Refill: 3      Plan:  1.  Benign essential hypertension: Will continue current medication, low-sodium diet advised, Counseled to regularly check BP at home with goal averaging <130/80.   2.mixed hyperlipidemia: will obtain   fasting CMP and lipid panel.  Diet and exercise counseled,  Will continue current medications  3.  Edema: Will start diuretic, low-sodium diet advised  4.  Right leg cellulitis: Will start oral antibiotics  5. Atrial fibrillation: rate controlled, on anticoagulation , will continue current medications ,    6.  Major depression: Stable, will obtain labs, refilled Wellbutrin  7. impaired glucose   :    obtain fasting CMP  and hba1c  , diet and exercise counseled            Follow Up   Return in about 1 week (around 7/9/2025).  Patient was given instructions and counseling regarding her condition or for health maintenance advice. Please see specific information pulled into the AVS if appropriate.

## 2025-07-03 LAB
ALBUMIN SERPL-MCNC: 4.1 G/DL (ref 3.5–5.2)
ALBUMIN/GLOB SERPL: 1.7 G/DL
ALP SERPL-CCNC: 112 U/L (ref 39–117)
ALT SERPL-CCNC: 11 U/L (ref 1–33)
AST SERPL-CCNC: 17 U/L (ref 1–32)
BILIRUB SERPL-MCNC: 1.1 MG/DL (ref 0–1.2)
BUN SERPL-MCNC: 8 MG/DL (ref 8–23)
BUN/CREAT SERPL: 10 (ref 7–25)
CALCIUM SERPL-MCNC: 9 MG/DL (ref 8.6–10.5)
CHLORIDE SERPL-SCNC: 105 MMOL/L (ref 98–107)
CHOLEST SERPL-MCNC: 157 MG/DL (ref 0–200)
CO2 SERPL-SCNC: 25.7 MMOL/L (ref 22–29)
CREAT SERPL-MCNC: 0.8 MG/DL (ref 0.57–1)
EGFRCR SERPLBLD CKD-EPI 2021: 78.9 ML/MIN/1.73
ERYTHROCYTE [DISTWIDTH] IN BLOOD BY AUTOMATED COUNT: 12.2 % (ref 12.3–15.4)
GLOBULIN SER CALC-MCNC: 2.4 GM/DL
GLUCOSE SERPL-MCNC: 95 MG/DL (ref 65–99)
HBA1C MFR BLD: 5.9 % (ref 4.8–5.6)
HCT VFR BLD AUTO: 37.9 % (ref 34–46.6)
HDLC SERPL-MCNC: 62 MG/DL (ref 40–60)
HGB BLD-MCNC: 12.6 G/DL (ref 12–15.9)
LDLC SERPL CALC-MCNC: 75 MG/DL (ref 0–100)
MCH RBC QN AUTO: 32 PG (ref 26.6–33)
MCHC RBC AUTO-ENTMCNC: 33.2 G/DL (ref 31.5–35.7)
MCV RBC AUTO: 96.2 FL (ref 79–97)
PLATELET # BLD AUTO: 249 10*3/MM3 (ref 140–450)
POTASSIUM SERPL-SCNC: 3.8 MMOL/L (ref 3.5–5.2)
PROT SERPL-MCNC: 6.5 G/DL (ref 6–8.5)
RBC # BLD AUTO: 3.94 10*6/MM3 (ref 3.77–5.28)
SODIUM SERPL-SCNC: 140 MMOL/L (ref 136–145)
TRIGL SERPL-MCNC: 114 MG/DL (ref 0–150)
VLDLC SERPL CALC-MCNC: 20 MG/DL (ref 5–40)
WBC # BLD AUTO: 4.7 10*3/MM3 (ref 3.4–10.8)

## 2025-07-09 ENCOUNTER — OFFICE VISIT (OUTPATIENT)
Dept: INTERNAL MEDICINE | Facility: CLINIC | Age: 70
End: 2025-07-09
Payer: MEDICARE

## 2025-07-09 VITALS
BODY MASS INDEX: 41.81 KG/M2 | DIASTOLIC BLOOD PRESSURE: 55 MMHG | SYSTOLIC BLOOD PRESSURE: 144 MMHG | HEART RATE: 60 BPM | WEIGHT: 266.4 LBS | HEIGHT: 67 IN | TEMPERATURE: 97.5 F | OXYGEN SATURATION: 100 % | RESPIRATION RATE: 18 BRPM

## 2025-07-09 DIAGNOSIS — R60.0 LEG EDEMA: ICD-10-CM

## 2025-07-09 DIAGNOSIS — I10 BENIGN ESSENTIAL HYPERTENSION: Primary | ICD-10-CM

## 2025-07-09 DIAGNOSIS — L03.115 CELLULITIS OF RIGHT LEG WITHOUT FOOT: ICD-10-CM

## 2025-07-09 RX ORDER — POTASSIUM CHLORIDE 1500 MG/1
20 TABLET, EXTENDED RELEASE ORAL DAILY
Qty: 30 TABLET | Refills: 2 | Status: SHIPPED | OUTPATIENT
Start: 2025-07-09

## 2025-07-09 NOTE — PROGRESS NOTES
"Chief Complaint  Hypertension and Leg Swelling    Subjective        Neetu Wick presents to [facility]  HPI: Patient is here to follow up on the blood pressure  The patient is taking the blood pressure medications as prescribed and has had no side effects. The patient is also here to follow up on  leg swelling  and infection of right leg , she is taking her antibiotics as prescribed and also completed the diuretics  Hypertension   Pertinent negatives include no chest pain, palpitations or shortness of breath.      Objective   Vital Signs:  /55   Pulse 60   Temp 97.5 °F (36.4 °C) (Temporal)   Resp 18   Ht 170.2 cm (67\")   Wt 121 kg (266 lb 6.4 oz)   SpO2 100%   BMI 41.72 kg/m²   Estimated body mass index is 41.72 kg/m² as calculated from the following:    Height as of this encounter: 170.2 cm (67\").    Weight as of this encounter: 121 kg (266 lb 6.4 oz).            Physical Exam  Vitals and nursing note reviewed.   Constitutional:       General: She is not in acute distress.     Appearance: Normal appearance. She is obese. She is not diaphoretic.   HENT:      Head: Normocephalic and atraumatic.      Right Ear: External ear normal.      Left Ear: External ear normal.      Nose: Nose normal.   Eyes:      Extraocular Movements: Extraocular movements intact.      Conjunctiva/sclera: Conjunctivae normal.   Neck:      Trachea: Trachea normal.   Cardiovascular:      Rate and Rhythm: Normal rate and regular rhythm.      Heart sounds: Normal heart sounds.   Pulmonary:      Effort: Pulmonary effort is normal. No respiratory distress.      Breath sounds: Normal breath sounds.   Abdominal:      General: Abdomen is flat.   Musculoskeletal:         General: Deformity present.      Cervical back: Neck supple.      Right lower leg: Edema present.      Left lower leg: Edema present.      Comments: Compression stocking bilateral lower extremities    Moves all limbs   Skin:     General: " Skin is warm.      Findings: Erythema present.      Comments: Right leg mild erythema, bilateral venous stasis   Neurological:      Mental Status: She is alert and oriented to person, place, and time.      Comments: No gross motor or sensory deficits        Result Review :  The following data was reviewed by: Ines Hnacock MD on 07/09/2025:  CMP          9/19/2024    13:37 1/23/2025    10:19 7/2/2025    12:25   CMP   Glucose 106  111  95    BUN 10  14  8.0    Creatinine 0.75  0.86  0.80    EGFR 85.2  72.3  78.9    Sodium 140  140  140    Potassium 4.0  3.9  3.8    Chloride 106  104  105    Calcium 9.0  8.9  9.0    Total Protein 6.3  6.8  6.5    Albumin 4.0  3.8  4.1    Globulin 2.3  3.0  2.4    Total Bilirubin 0.7  0.9  1.1    Alkaline Phosphatase 113  114  112    AST (SGOT) 17  15  17    ALT (SGPT) 14  11  11    Albumin/Globulin Ratio 1.7  1.3  1.7    BUN/Creatinine Ratio 13.3  16.3  10.0      CBC          9/19/2024    13:37 1/23/2025    10:19 7/2/2025    12:25   CBC   WBC 4.08  4.88  4.70    RBC 4.07  4.32  3.94    Hemoglobin 12.6  12.9  12.6    Hematocrit 39.0  41.3  37.9    MCV 95.8  95.6  96.2    MCH 31.0  29.9  32.0    MCHC 32.3  31.2  33.2    RDW 12.4  12.1  12.2    Platelets 265  246  249                Assessment and Plan   Diagnoses and all orders for this visit:    1. Benign essential hypertension (Primary)    2. Leg edema  -     potassium chloride (KLOR-CON M20) 20 MEQ CR tablet; Take 1 tablet by mouth Daily.  Dispense: 30 tablet; Refill: 2  -     torsemide 40 MG tablet; Take 40 mg by mouth Daily.  Dispense: 30 tablet; Refill: 2    3. Cellulitis of right leg without foot    Plan:  1.  Benign essential hypertension: Will continue current medication, low-sodium diet advised, Counseled to regularly check BP at home with goal averaging <130/80.   2.  Leg edema: Will increase diuretic, low-sodium diet and monitor vitals  3.  Right leg cellulitis: To complete oral antibiotics           Follow Up   Return in  about 20 days (around 7/29/2025).  Patient was given instructions and counseling regarding her condition or for health maintenance advice. Please see specific information pulled into the AVS if appropriate.

## 2025-08-06 ENCOUNTER — HOSPITAL ENCOUNTER (OUTPATIENT)
Dept: GENERAL RADIOLOGY | Facility: HOSPITAL | Age: 70
Discharge: HOME | End: 2025-08-06
Admitting: STUDENT IN AN ORGANIZED HEALTH CARE EDUCATION/TRAINING PROGRAM
Payer: MEDICARE

## 2025-08-06 ENCOUNTER — TELEPHONE (OUTPATIENT)
Dept: INTERNAL MEDICINE | Facility: CLINIC | Age: 70
End: 2025-08-06
Payer: MEDICARE

## 2025-08-06 ENCOUNTER — OFFICE VISIT (OUTPATIENT)
Dept: INTERNAL MEDICINE | Facility: CLINIC | Age: 70
End: 2025-08-06
Payer: MEDICARE

## 2025-08-06 VITALS
HEIGHT: 67 IN | OXYGEN SATURATION: 97 % | TEMPERATURE: 98.6 F | SYSTOLIC BLOOD PRESSURE: 167 MMHG | BODY MASS INDEX: 42.69 KG/M2 | WEIGHT: 272 LBS | RESPIRATION RATE: 18 BRPM | DIASTOLIC BLOOD PRESSURE: 76 MMHG | HEART RATE: 58 BPM

## 2025-08-06 DIAGNOSIS — I10 HIGH BLOOD PRESSURE DETERMINED BY EXAMINATION: ICD-10-CM

## 2025-08-06 DIAGNOSIS — M54.2 NECK PAIN: Primary | ICD-10-CM

## 2025-08-06 DIAGNOSIS — M54.9 UPPER BACK PAIN: ICD-10-CM

## 2025-08-06 DIAGNOSIS — M54.2 NECK PAIN: ICD-10-CM

## 2025-08-06 DIAGNOSIS — I10 HYPERTENSION, UNSPECIFIED TYPE: ICD-10-CM

## 2025-08-06 PROCEDURE — 99214 OFFICE O/P EST MOD 30 MIN: CPT | Performed by: STUDENT IN AN ORGANIZED HEALTH CARE EDUCATION/TRAINING PROGRAM

## 2025-08-06 PROCEDURE — 1125F AMNT PAIN NOTED PAIN PRSNT: CPT | Performed by: STUDENT IN AN ORGANIZED HEALTH CARE EDUCATION/TRAINING PROGRAM

## 2025-08-06 PROCEDURE — 3077F SYST BP >= 140 MM HG: CPT | Performed by: STUDENT IN AN ORGANIZED HEALTH CARE EDUCATION/TRAINING PROGRAM

## 2025-08-06 PROCEDURE — 72072 X-RAY EXAM THORAC SPINE 3VWS: CPT

## 2025-08-06 PROCEDURE — 72040 X-RAY EXAM NECK SPINE 2-3 VW: CPT

## 2025-08-06 PROCEDURE — 3078F DIAST BP <80 MM HG: CPT | Performed by: STUDENT IN AN ORGANIZED HEALTH CARE EDUCATION/TRAINING PROGRAM

## 2025-08-06 PROCEDURE — 3044F HG A1C LEVEL LT 7.0%: CPT | Performed by: STUDENT IN AN ORGANIZED HEALTH CARE EDUCATION/TRAINING PROGRAM

## 2025-08-06 RX ORDER — METHOCARBAMOL 500 MG/1
500 TABLET, FILM COATED ORAL 3 TIMES DAILY
Qty: 30 TABLET | Refills: 0 | Status: SHIPPED | OUTPATIENT
Start: 2025-08-06

## 2025-08-07 ENCOUNTER — HOSPITAL ENCOUNTER (OUTPATIENT)
Facility: HOSPITAL | Age: 70
Setting detail: HOSPITAL OUTPATIENT SURGERY
Discharge: HOME | End: 2025-08-07
Attending: UROLOGY | Admitting: UROLOGY
Payer: MEDICARE

## 2025-08-07 ENCOUNTER — ANESTHESIA EVENT (OUTPATIENT)
Dept: PERIOP | Facility: HOSPITAL | Age: 70
End: 2025-08-07
Payer: MEDICARE

## 2025-08-07 ENCOUNTER — ANESTHESIA (OUTPATIENT)
Dept: PERIOP | Facility: HOSPITAL | Age: 70
End: 2025-08-07
Payer: MEDICARE

## 2025-08-07 ENCOUNTER — APPOINTMENT (OUTPATIENT)
Dept: GENERAL RADIOLOGY | Facility: HOSPITAL | Age: 70
End: 2025-08-07
Payer: MEDICARE

## 2025-08-07 VITALS
TEMPERATURE: 97.7 F | RESPIRATION RATE: 16 BRPM | HEART RATE: 60 BPM | SYSTOLIC BLOOD PRESSURE: 150 MMHG | DIASTOLIC BLOOD PRESSURE: 63 MMHG | OXYGEN SATURATION: 92 %

## 2025-08-07 DIAGNOSIS — N39.41 URGE INCONTINENCE OF URINE: ICD-10-CM

## 2025-08-07 PROCEDURE — 25010000002 ONDANSETRON PER 1 MG: Performed by: NURSE ANESTHETIST, CERTIFIED REGISTERED

## 2025-08-07 PROCEDURE — 25010000002 CLINDAMYCIN 900 MG/50ML SOLUTION: Performed by: NURSE PRACTITIONER

## 2025-08-07 PROCEDURE — 76000 FLUOROSCOPY <1 HR PHYS/QHP: CPT

## 2025-08-07 PROCEDURE — 25010000002 DEXAMETHASONE PER 1 MG: Performed by: NURSE ANESTHETIST, CERTIFIED REGISTERED

## 2025-08-07 PROCEDURE — C1778 LEAD, NEUROSTIMULATOR: HCPCS | Performed by: UROLOGY

## 2025-08-07 PROCEDURE — 25810000003 LACTATED RINGERS PER 1000 ML: Performed by: UROLOGY

## 2025-08-07 PROCEDURE — S0260 H&P FOR SURGERY: HCPCS | Performed by: UROLOGY

## 2025-08-07 PROCEDURE — C1787 PATIENT PROGR, NEUROSTIM: HCPCS | Performed by: UROLOGY

## 2025-08-07 PROCEDURE — 25010000002 LIDOCAINE 1% - EPINEPHRINE 1:100000 1 %-1:100000 SOLUTION: Performed by: UROLOGY

## 2025-08-07 PROCEDURE — C1767 GENERATOR, NEURO NON-RECHARG: HCPCS | Performed by: UROLOGY

## 2025-08-07 PROCEDURE — 25010000002 LIDOCAINE (CARDIAC): Performed by: NURSE ANESTHETIST, CERTIFIED REGISTERED

## 2025-08-07 PROCEDURE — 25010000002 FENTANYL CITRATE (PF) 50 MCG/ML SOLUTION PREFILLED SYRINGE: Performed by: NURSE ANESTHETIST, CERTIFIED REGISTERED

## 2025-08-07 PROCEDURE — 64590 INS/RPL PRPH SAC/GSTR NPG/R: CPT | Performed by: UROLOGY

## 2025-08-07 PROCEDURE — 25010000002 MIDAZOLAM PER 1MG: Performed by: NURSE ANESTHETIST, CERTIFIED REGISTERED

## 2025-08-07 PROCEDURE — 64561 IMPLANT NEUROELECTRODES: CPT | Performed by: UROLOGY

## 2025-08-07 PROCEDURE — 25010000002 PROPOFOL 10 MG/ML EMULSION: Performed by: NURSE ANESTHETIST, CERTIFIED REGISTERED

## 2025-08-07 DEVICE — KT LD NEUROSTM INTERSTIM SURESCAN FULLBDY 4.32MM: Type: IMPLANTABLE DEVICE | Site: BACK | Status: FUNCTIONAL

## 2025-08-07 DEVICE — NEUROSTM SACRAL/NRV INTERSTIMX NONRECHG: Type: IMPLANTABLE DEVICE | Site: BACK | Status: FUNCTIONAL

## 2025-08-07 RX ORDER — BUPIVACAINE HYDROCHLORIDE AND EPINEPHRINE 2.5; 5 MG/ML; UG/ML
INJECTION, SOLUTION EPIDURAL; INFILTRATION; INTRACAUDAL; PERINEURAL AS NEEDED
Status: DISCONTINUED | OUTPATIENT
Start: 2025-08-07 | End: 2025-08-07 | Stop reason: HOSPADM

## 2025-08-07 RX ORDER — ACETAMINOPHEN 500 MG
1000 TABLET ORAL EVERY 6 HOURS
Qty: 30 TABLET | Refills: 0 | Status: SHIPPED | OUTPATIENT
Start: 2025-08-07 | End: 2025-08-10

## 2025-08-07 RX ORDER — PROPOFOL 10 MG/ML
VIAL (ML) INTRAVENOUS AS NEEDED
Status: DISCONTINUED | OUTPATIENT
Start: 2025-08-07 | End: 2025-08-07 | Stop reason: SURG

## 2025-08-07 RX ORDER — MIDAZOLAM HYDROCHLORIDE 2 MG/2ML
INJECTION, SOLUTION INTRAMUSCULAR; INTRAVENOUS AS NEEDED
Status: DISCONTINUED | OUTPATIENT
Start: 2025-08-07 | End: 2025-08-07 | Stop reason: SURG

## 2025-08-07 RX ORDER — FENTANYL CITRATE 50 UG/ML
INJECTION, SOLUTION INTRAMUSCULAR; INTRAVENOUS AS NEEDED
Status: DISCONTINUED | OUTPATIENT
Start: 2025-08-07 | End: 2025-08-07 | Stop reason: SURG

## 2025-08-07 RX ORDER — OXYCODONE HYDROCHLORIDE 5 MG/1
5 TABLET ORAL EVERY 6 HOURS PRN
Qty: 5 TABLET | Refills: 0 | Status: SHIPPED | OUTPATIENT
Start: 2025-08-07

## 2025-08-07 RX ORDER — SULFAMETHOXAZOLE AND TRIMETHOPRIM 800; 160 MG/1; MG/1
1 TABLET ORAL 2 TIMES DAILY
Qty: 6 TABLET | Refills: 0 | Status: SHIPPED | OUTPATIENT
Start: 2025-08-07

## 2025-08-07 RX ORDER — KETAMINE HCL IN NACL, ISO-OSM 100MG/10ML
SYRINGE (ML) INJECTION AS NEEDED
Status: DISCONTINUED | OUTPATIENT
Start: 2025-08-07 | End: 2025-08-07 | Stop reason: SURG

## 2025-08-07 RX ORDER — DEXAMETHASONE SODIUM PHOSPHATE 4 MG/ML
INJECTION, SOLUTION INTRA-ARTICULAR; INTRALESIONAL; INTRAMUSCULAR; INTRAVENOUS; SOFT TISSUE AS NEEDED
Status: DISCONTINUED | OUTPATIENT
Start: 2025-08-07 | End: 2025-08-07 | Stop reason: SURG

## 2025-08-07 RX ORDER — SODIUM CHLORIDE, SODIUM LACTATE, POTASSIUM CHLORIDE, CALCIUM CHLORIDE 600; 310; 30; 20 MG/100ML; MG/100ML; MG/100ML; MG/100ML
30 INJECTION, SOLUTION INTRAVENOUS CONTINUOUS
Status: DISCONTINUED | OUTPATIENT
Start: 2025-08-07 | End: 2025-08-07 | Stop reason: HOSPADM

## 2025-08-07 RX ORDER — CLINDAMYCIN PHOSPHATE 900 MG/50ML
900 INJECTION, SOLUTION INTRAVENOUS ONCE
Status: COMPLETED | OUTPATIENT
Start: 2025-08-07 | End: 2025-08-07

## 2025-08-07 RX ORDER — DOCUSATE SODIUM 100 MG/1
100 CAPSULE, LIQUID FILLED ORAL 2 TIMES DAILY
Qty: 15 CAPSULE | Refills: 1 | Status: SHIPPED | OUTPATIENT
Start: 2025-08-07

## 2025-08-07 RX ORDER — ONDANSETRON 2 MG/ML
INJECTION INTRAMUSCULAR; INTRAVENOUS AS NEEDED
Status: DISCONTINUED | OUTPATIENT
Start: 2025-08-07 | End: 2025-08-07 | Stop reason: SURG

## 2025-08-07 RX ORDER — SODIUM CHLORIDE 0.9 % (FLUSH) 0.9 %
10 SYRINGE (ML) INJECTION AS NEEDED
Status: DISCONTINUED | OUTPATIENT
Start: 2025-08-07 | End: 2025-08-07 | Stop reason: HOSPADM

## 2025-08-07 RX ORDER — LIDOCAINE HYDROCHLORIDE AND EPINEPHRINE 10; 10 MG/ML; UG/ML
INJECTION, SOLUTION INFILTRATION; PERINEURAL AS NEEDED
Status: DISCONTINUED | OUTPATIENT
Start: 2025-08-07 | End: 2025-08-07 | Stop reason: HOSPADM

## 2025-08-07 RX ADMIN — DEXAMETHASONE SODIUM PHOSPHATE 4 MG: 4 INJECTION, SOLUTION INTRAMUSCULAR; INTRAVENOUS at 12:54

## 2025-08-07 RX ADMIN — PROPOFOL 50 MG: 10 INJECTION, EMULSION INTRAVENOUS at 12:44

## 2025-08-07 RX ADMIN — PROPOFOL 140 MCG/KG/MIN: 10 INJECTION, EMULSION INTRAVENOUS at 12:45

## 2025-08-07 RX ADMIN — MIDAZOLAM HYDROCHLORIDE 2 MG: 1 INJECTION, SOLUTION INTRAMUSCULAR; INTRAVENOUS at 12:41

## 2025-08-07 RX ADMIN — Medication 10 MG: at 12:44

## 2025-08-07 RX ADMIN — FENTANYL CITRATE 25 MCG: 50 INJECTION, SOLUTION INTRAMUSCULAR; INTRAVENOUS at 13:02

## 2025-08-07 RX ADMIN — LIDOCAINE HYDROCHLORIDE 60 MG: 20 INJECTION, SOLUTION INTRAVENOUS at 12:44

## 2025-08-07 RX ADMIN — CLINDAMYCIN IN 5 PERCENT DEXTROSE 900 MG: 18 INJECTION, SOLUTION INTRAVENOUS at 11:44

## 2025-08-07 RX ADMIN — FENTANYL CITRATE 25 MCG: 50 INJECTION, SOLUTION INTRAMUSCULAR; INTRAVENOUS at 12:44

## 2025-08-07 RX ADMIN — SODIUM CHLORIDE, POTASSIUM CHLORIDE, SODIUM LACTATE AND CALCIUM CHLORIDE 30 ML/HR: 600; 310; 30; 20 INJECTION, SOLUTION INTRAVENOUS at 11:43

## 2025-08-07 RX ADMIN — ONDANSETRON 4 MG: 2 INJECTION, SOLUTION INTRAMUSCULAR; INTRAVENOUS at 12:41

## 2025-08-12 ENCOUNTER — TELEPHONE (OUTPATIENT)
Dept: INTERNAL MEDICINE | Facility: CLINIC | Age: 70
End: 2025-08-12
Payer: MEDICARE

## 2025-08-12 DIAGNOSIS — N39.41 URGE INCONTINENCE OF URINE: ICD-10-CM

## 2025-08-20 ENCOUNTER — OFFICE VISIT (OUTPATIENT)
Dept: GASTROENTEROLOGY | Facility: CLINIC | Age: 70
End: 2025-08-20
Payer: MEDICARE

## 2025-08-20 VITALS
OXYGEN SATURATION: 95 % | WEIGHT: 264 LBS | BODY MASS INDEX: 41.34 KG/M2 | HEART RATE: 66 BPM | DIASTOLIC BLOOD PRESSURE: 94 MMHG | SYSTOLIC BLOOD PRESSURE: 138 MMHG

## 2025-08-20 DIAGNOSIS — E66.813 CLASS 3 SEVERE OBESITY DUE TO EXCESS CALORIES WITH SERIOUS COMORBIDITY AND BODY MASS INDEX (BMI) OF 40.0 TO 44.9 IN ADULT: Chronic | ICD-10-CM

## 2025-08-20 DIAGNOSIS — K76.0 FATTY (CHANGE OF) LIVER, NOT ELSEWHERE CLASSIFIED: Chronic | ICD-10-CM

## 2025-08-20 DIAGNOSIS — R16.0 HEPATOMEGALY: Primary | Chronic | ICD-10-CM

## 2025-08-20 DIAGNOSIS — K92.9 FUNCTIONAL GASTROINTESTINAL DISORDER: Chronic | ICD-10-CM

## 2025-08-20 DIAGNOSIS — R19.5 LOOSE STOOLS: Chronic | ICD-10-CM

## 2025-08-20 DIAGNOSIS — D12.6 ADENOMATOUS POLYP OF COLON, UNSPECIFIED PART OF COLON: ICD-10-CM

## 2025-08-20 PROCEDURE — 99214 OFFICE O/P EST MOD 30 MIN: CPT | Performed by: NURSE PRACTITIONER

## 2025-08-20 PROCEDURE — 1159F MED LIST DOCD IN RCRD: CPT | Performed by: NURSE PRACTITIONER

## 2025-08-20 PROCEDURE — 3075F SYST BP GE 130 - 139MM HG: CPT | Performed by: NURSE PRACTITIONER

## 2025-08-20 PROCEDURE — 3080F DIAST BP >= 90 MM HG: CPT | Performed by: NURSE PRACTITIONER

## 2025-08-20 PROCEDURE — 1160F RVW MEDS BY RX/DR IN RCRD: CPT | Performed by: NURSE PRACTITIONER

## 2025-08-22 ENCOUNTER — TREATMENT (OUTPATIENT)
Dept: PHYSICAL THERAPY | Facility: CLINIC | Age: 70
End: 2025-08-22
Payer: MEDICARE

## 2025-08-22 ENCOUNTER — TELEPHONE (OUTPATIENT)
Dept: UROLOGY | Facility: CLINIC | Age: 70
End: 2025-08-22
Payer: MEDICARE

## 2025-08-22 DIAGNOSIS — M54.12 RADICULOPATHY, CERVICAL: Primary | ICD-10-CM

## 2025-08-22 DIAGNOSIS — M54.2 PAIN, NECK: ICD-10-CM

## 2025-08-27 ENCOUNTER — TREATMENT (OUTPATIENT)
Dept: PHYSICAL THERAPY | Facility: CLINIC | Age: 70
End: 2025-08-27
Payer: MEDICARE

## 2025-08-27 DIAGNOSIS — M54.12 RADICULOPATHY, CERVICAL: Primary | ICD-10-CM

## 2025-08-27 DIAGNOSIS — M54.2 PAIN, NECK: ICD-10-CM

## 2025-08-29 ENCOUNTER — TREATMENT (OUTPATIENT)
Dept: PHYSICAL THERAPY | Facility: CLINIC | Age: 70
End: 2025-08-29
Payer: MEDICARE

## 2025-08-29 DIAGNOSIS — M54.12 RADICULOPATHY, CERVICAL: Primary | ICD-10-CM

## 2025-08-29 DIAGNOSIS — M54.2 PAIN, NECK: ICD-10-CM

## (undated) DEVICE — SHEET,DRAPE,70X100,STERILE: Brand: MEDLINE

## (undated) DEVICE — FRCP BX RADJAW4 NDL 2.8 240 STD OG

## (undated) DEVICE — RICH MAJOR PROCEDURE: Brand: MEDLINE INDUSTRIES, INC.

## (undated) DEVICE — GLV SURG SENSICARE PI LF PF 7.5 GRN STRL

## (undated) DEVICE — VLV SXN AIR/H2O ORCAPOD3 1P/U STRL

## (undated) DEVICE — HYBRID CO2 TUBING/CAP SET FOR OLYMPUS® SCOPES & CO2 SOURCE: Brand: ERBE

## (undated) DEVICE — GLOVE,SURG,SENSICARE,ALOE,LF,PF,7: Brand: MEDLINE

## (undated) DEVICE — CANISTER, RIGID, 2000CC: Brand: MEDLINE INDUSTRIES, INC.

## (undated) DEVICE — PAD,ARMBOARD,CONV,FOAM,2X8X20",12PR/CS: Brand: MEDLINE

## (undated) DEVICE — ANTIBACTERIAL UNDYED BRAIDED (POLYGLACTIN 910), SYNTHETIC ABSORBABLE SUTURE: Brand: COATED VICRYL

## (undated) DEVICE — KT PT/PROG SMRT INTERSTIM/X NEUROSTM W/COMMUNIC

## (undated) DEVICE — INTENDED FOR TISSUE SEPARATION, AND OTHER PROCEDURES THAT REQUIRE A SHARP SURGICAL BLADE TO PUNCTURE OR CUT.: Brand: BARD-PARKER ® CARBON RIB-BACK BLADES

## (undated) DEVICE — 1000 S-DRAPE TOWEL DRAPE 10/BX: Brand: STERI-DRAPE™

## (undated) DEVICE — SYR LUER SLPTP 50ML

## (undated) DEVICE — ENDOSCOPY PORT CONNECTOR FOR OLYMPUS® SCOPES: Brand: ERBE

## (undated) DEVICE — GAUZE,SPONGE,4"X4",12PLY,STRL,LF,10/TRAY: Brand: MEDLINE

## (undated) DEVICE — 450 ML BOTTLE OF 0.05% CHLORHEXIDINE GLUCONATE IN 99.95% STERILE WATER FOR IRRIGATION, USP AND APPLICATOR.: Brand: IRRISEPT ANTIMICROBIAL WOUND LAVAGE

## (undated) DEVICE — HDRST POSTN SLOTTED A/

## (undated) DEVICE — 3M™ IOBAN™ 2 ANTIMICROBIAL INCISE DRAPE 6650EZ: Brand: IOBAN™ 2

## (undated) DEVICE — HYBRID TUBING/CAP SET FOR OLYMPUS® SCOPES: Brand: ERBE

## (undated) DEVICE — QUICK CATCH IN-LINE SUCTION POLYP TRAP IS USED FOR SUCTION RETRIEVAL OF ENDOSCOPICALLY REMOVED POLYPS.

## (undated) DEVICE — Device

## (undated) DEVICE — COVER,C-ARM,41X74: Brand: MEDLINE

## (undated) DEVICE — SYR LL TP 10ML STRL

## (undated) DEVICE — LUBE JELLY PK/2.75GM STRL BX/144

## (undated) DEVICE — TOWEL,OR,DSP,ST,BLUE,STD,4/PK,20PK/CS: Brand: MEDLINE

## (undated) DEVICE — NDL HYPO ECLPS SFTY 22G 1 1/2IN

## (undated) DEVICE — CVR TRANSD CIV FLX TPR 11.9 TO 3.8X61CM

## (undated) DEVICE — SINGLE-USE POLYPECTOMY SNARE: Brand: CAPTIVATOR II

## (undated) DEVICE — SKIN AFFIX SURG ADHESIVE 72/CS 0.55ML: Brand: MEDLINE